# Patient Record
Sex: MALE | Race: OTHER | HISPANIC OR LATINO
[De-identification: names, ages, dates, MRNs, and addresses within clinical notes are randomized per-mention and may not be internally consistent; named-entity substitution may affect disease eponyms.]

---

## 2023-06-22 PROBLEM — Z00.00 ENCOUNTER FOR PREVENTIVE HEALTH EXAMINATION: Status: ACTIVE | Noted: 2023-06-22

## 2023-07-18 ENCOUNTER — APPOINTMENT (OUTPATIENT)
Dept: NEUROSURGERY | Facility: CLINIC | Age: 63
End: 2023-07-18
Payer: MEDICAID

## 2023-07-18 VITALS
HEIGHT: 71 IN | OXYGEN SATURATION: 96 % | TEMPERATURE: 97.9 F | BODY MASS INDEX: 24.08 KG/M2 | WEIGHT: 172 LBS | SYSTOLIC BLOOD PRESSURE: 132 MMHG | HEART RATE: 81 BPM | DIASTOLIC BLOOD PRESSURE: 84 MMHG

## 2023-07-18 DIAGNOSIS — Z86.39 PERSONAL HISTORY OF OTHER ENDOCRINE, NUTRITIONAL AND METABOLIC DISEASE: ICD-10-CM

## 2023-07-18 DIAGNOSIS — Z87.39 PERSONAL HISTORY OF OTHER DISEASES OF THE MUSCULOSKELETAL SYSTEM AND CONNECTIVE TISSUE: ICD-10-CM

## 2023-07-18 DIAGNOSIS — M54.9 DORSALGIA, UNSPECIFIED: ICD-10-CM

## 2023-07-18 DIAGNOSIS — G95.9 DISEASE OF SPINAL CORD, UNSPECIFIED: ICD-10-CM

## 2023-07-18 DIAGNOSIS — Z86.79 PERSONAL HISTORY OF OTHER DISEASES OF THE CIRCULATORY SYSTEM: ICD-10-CM

## 2023-07-18 DIAGNOSIS — Z78.9 OTHER SPECIFIED HEALTH STATUS: ICD-10-CM

## 2023-07-18 DIAGNOSIS — M54.16 RADICULOPATHY, LUMBAR REGION: ICD-10-CM

## 2023-07-18 DIAGNOSIS — M51.26 OTHER INTERVERTEBRAL DISC DISPLACEMENT, LUMBAR REGION: ICD-10-CM

## 2023-07-18 DIAGNOSIS — M50.20 OTHER CERVICAL DISC DISPLACEMENT, UNSPECIFIED CERVICAL REGION: ICD-10-CM

## 2023-07-18 DIAGNOSIS — M48.00 SPINAL STENOSIS, SITE UNSPECIFIED: ICD-10-CM

## 2023-07-18 PROCEDURE — 99203 OFFICE O/P NEW LOW 30 MIN: CPT

## 2023-07-18 RX ORDER — LISINOPRIL 10 MG/1
10 TABLET ORAL
Refills: 0 | Status: ACTIVE | COMMUNITY

## 2023-07-20 NOTE — PHYSICAL EXAM
[General Appearance - Alert] : alert [General Appearance - In No Acute Distress] : in no acute distress [Oriented To Time, Place, And Person] : oriented to person, place, and time [Impaired Insight] : insight and judgment were intact [Affect] : the affect was normal [2+] : Patella left 2+ [Normal] : normal [4] : 4/5 Ankle Plantar Flexion (S1) [5] : 5/5 Ankle Plantar Flexion (S1) [3+] : Brachioradialis left 3+ [Straight-Leg Raise Test - Left] : straight leg raise of the left leg was negative [Straight-Leg Raise Test - Right] : straight leg raise  of the right leg was negative

## 2023-07-20 NOTE — REVIEW OF SYSTEMS
[Negative] : Heme/Lymph [As Noted in HPI] : as noted in HPI [Numbness] : numbness [Tingling] : tingling [Abnormal Sensation] : an abnormal sensation [Difficulty Walking] : difficulty walking [de-identified] : LBP

## 2023-07-20 NOTE — ASSESSMENT
[FreeTextEntry1] : Mr. Bejarano is a 64 y/o, male, with ,multilevel spondylosis and stenosis in both cervical and lumbar spine.He is very myelopathic on exam. Will obtain CT cervical spine for evaluation of OPLL and surgical planning.  Will refer pt to physical therapy for cervical and lumbar spine. Will obtain xrays to r/o instability. F/u once imaging is completed.

## 2023-07-20 NOTE — RESULTS/DATA
[FreeTextEntry1] : IMPRESSION: \par \par 1. At L4-L5, stable minimal grade 1 retrolisthesis  with associated stable broad-based disc bulge demonstrating annular fissuring again abutting the traversing L5 nerve roots with stable mild bilateral subarticular recess stenosis and stable moderate bilateral neuroforaminal stenosis. \par \par \par 2. At L5-S1, stable minimal grade 1 retrolisthesis with associated stable disc bulge demonstrating annular fissuring again contacting the exiting left L5 nerve root and right greater than left traversing S1 nerve roots. Stable mild to moderate left and mild right-sided neuroforaminal stenosis. \par \par 3. Stable diffusely heterogeneous marrow signal which can be seen with osteopenia/osteoporosis or underlying metabolic conditions such as renal osteodystrophy.

## 2023-07-20 NOTE — HISTORY OF PRESENT ILLNESS
[FreeTextEntry1] : lbp [de-identified] : Mr. Bejarano ids a 62 y/o, male, with hx of HTN, HLD, LBP here to establish care as a new patient. He reports his low back pain radiates down R leg. He describes the pain more as a discomfort. He has associated numbness, tingling, and weakness of R leg. MRI cervical and lumbar spine were performed which illustrate multilevel spondylosis, herniations, and stenosis. Denies bowel/ bladder dysfunction. Denies any neck pain, UE pain. He has numbness of fingers.

## 2025-05-05 ENCOUNTER — INPATIENT (INPATIENT)
Facility: HOSPITAL | Age: 65
LOS: 17 days | Discharge: HOME | DRG: 68 | End: 2025-05-23
Attending: NEUROLOGICAL SURGERY | Admitting: INTERNAL MEDICINE
Payer: COMMERCIAL

## 2025-05-05 VITALS
WEIGHT: 169.98 LBS | TEMPERATURE: 98 F | DIASTOLIC BLOOD PRESSURE: 77 MMHG | HEART RATE: 68 BPM | HEIGHT: 69 IN | RESPIRATION RATE: 16 BRPM | SYSTOLIC BLOOD PRESSURE: 113 MMHG | OXYGEN SATURATION: 98 %

## 2025-05-05 DIAGNOSIS — I65.29 OCCLUSION AND STENOSIS OF UNSPECIFIED CAROTID ARTERY: ICD-10-CM

## 2025-05-05 LAB
ALBUMIN SERPL ELPH-MCNC: 4.2 G/DL — SIGNIFICANT CHANGE UP (ref 3.3–5)
ALP SERPL-CCNC: 103 U/L — SIGNIFICANT CHANGE UP (ref 40–120)
ALT FLD-CCNC: 33 U/L — SIGNIFICANT CHANGE UP (ref 10–45)
ANION GAP SERPL CALC-SCNC: 10 MMOL/L — SIGNIFICANT CHANGE UP (ref 5–17)
ANION GAP SERPL CALC-SCNC: 11 MMOL/L — SIGNIFICANT CHANGE UP (ref 5–17)
APTT BLD: 30.4 SEC — SIGNIFICANT CHANGE UP (ref 26.1–36.8)
AST SERPL-CCNC: 33 U/L — SIGNIFICANT CHANGE UP (ref 10–40)
BASOPHILS # BLD AUTO: 0.03 K/UL — SIGNIFICANT CHANGE UP (ref 0–0.2)
BASOPHILS NFR BLD AUTO: 0.4 % — SIGNIFICANT CHANGE UP (ref 0–2)
BILIRUB SERPL-MCNC: 0.3 MG/DL — SIGNIFICANT CHANGE UP (ref 0.2–1.2)
BUN SERPL-MCNC: 11 MG/DL — SIGNIFICANT CHANGE UP (ref 7–23)
BUN SERPL-MCNC: 14 MG/DL — SIGNIFICANT CHANGE UP (ref 7–23)
CALCIUM SERPL-MCNC: 8.5 MG/DL — SIGNIFICANT CHANGE UP (ref 8.4–10.5)
CALCIUM SERPL-MCNC: 8.9 MG/DL — SIGNIFICANT CHANGE UP (ref 8.4–10.5)
CHLORIDE SERPL-SCNC: 103 MMOL/L — SIGNIFICANT CHANGE UP (ref 96–108)
CHLORIDE SERPL-SCNC: 105 MMOL/L — SIGNIFICANT CHANGE UP (ref 96–108)
CO2 SERPL-SCNC: 21 MMOL/L — LOW (ref 22–31)
CO2 SERPL-SCNC: 22 MMOL/L — SIGNIFICANT CHANGE UP (ref 22–31)
CREAT SERPL-MCNC: 0.7 MG/DL — SIGNIFICANT CHANGE UP (ref 0.5–1.3)
CREAT SERPL-MCNC: 0.82 MG/DL — SIGNIFICANT CHANGE UP (ref 0.5–1.3)
EGFR: 102 ML/MIN/1.73M2 — SIGNIFICANT CHANGE UP
EGFR: 102 ML/MIN/1.73M2 — SIGNIFICANT CHANGE UP
EGFR: 97 ML/MIN/1.73M2 — SIGNIFICANT CHANGE UP
EGFR: 97 ML/MIN/1.73M2 — SIGNIFICANT CHANGE UP
EOSINOPHIL # BLD AUTO: 0.04 K/UL — SIGNIFICANT CHANGE UP (ref 0–0.5)
EOSINOPHIL NFR BLD AUTO: 0.5 % — SIGNIFICANT CHANGE UP (ref 0–6)
GAS PNL BLDV: SIGNIFICANT CHANGE UP
GLUCOSE SERPL-MCNC: 89 MG/DL — SIGNIFICANT CHANGE UP (ref 70–99)
GLUCOSE SERPL-MCNC: 97 MG/DL — SIGNIFICANT CHANGE UP (ref 70–99)
HCT VFR BLD CALC: 38.4 % — LOW (ref 39–50)
HCT VFR BLD CALC: 42.3 % — SIGNIFICANT CHANGE UP (ref 39–50)
HGB BLD-MCNC: 12.6 G/DL — LOW (ref 13–17)
HGB BLD-MCNC: 13.7 G/DL — SIGNIFICANT CHANGE UP (ref 13–17)
IMM GRANULOCYTES NFR BLD AUTO: 0.4 % — SIGNIFICANT CHANGE UP (ref 0–0.9)
INR BLD: 1.1 RATIO — SIGNIFICANT CHANGE UP (ref 0.85–1.16)
LYMPHOCYTES # BLD AUTO: 1.83 K/UL — SIGNIFICANT CHANGE UP (ref 1–3.3)
LYMPHOCYTES # BLD AUTO: 22 % — SIGNIFICANT CHANGE UP (ref 13–44)
MAGNESIUM SERPL-MCNC: 2 MG/DL — SIGNIFICANT CHANGE UP (ref 1.6–2.6)
MCHC RBC-ENTMCNC: 29 PG — SIGNIFICANT CHANGE UP (ref 27–34)
MCHC RBC-ENTMCNC: 29.3 PG — SIGNIFICANT CHANGE UP (ref 27–34)
MCHC RBC-ENTMCNC: 32.4 G/DL — SIGNIFICANT CHANGE UP (ref 32–36)
MCHC RBC-ENTMCNC: 32.8 G/DL — SIGNIFICANT CHANGE UP (ref 32–36)
MCV RBC AUTO: 89.3 FL — SIGNIFICANT CHANGE UP (ref 80–100)
MCV RBC AUTO: 89.6 FL — SIGNIFICANT CHANGE UP (ref 80–100)
MONOCYTES # BLD AUTO: 0.67 K/UL — SIGNIFICANT CHANGE UP (ref 0–0.9)
MONOCYTES NFR BLD AUTO: 8.1 % — SIGNIFICANT CHANGE UP (ref 2–14)
NEUTROPHILS # BLD AUTO: 5.7 K/UL — SIGNIFICANT CHANGE UP (ref 1.8–7.4)
NEUTROPHILS NFR BLD AUTO: 68.6 % — SIGNIFICANT CHANGE UP (ref 43–77)
NRBC BLD AUTO-RTO: 0 /100 WBCS — SIGNIFICANT CHANGE UP (ref 0–0)
NRBC BLD AUTO-RTO: 0 /100 WBCS — SIGNIFICANT CHANGE UP (ref 0–0)
PHOSPHATE SERPL-MCNC: 2.2 MG/DL — LOW (ref 2.5–4.5)
PLATELET # BLD AUTO: 305 K/UL — SIGNIFICANT CHANGE UP (ref 150–400)
PLATELET # BLD AUTO: 327 K/UL — SIGNIFICANT CHANGE UP (ref 150–400)
POTASSIUM SERPL-MCNC: 3.9 MMOL/L — SIGNIFICANT CHANGE UP (ref 3.5–5.3)
POTASSIUM SERPL-MCNC: 4.1 MMOL/L — SIGNIFICANT CHANGE UP (ref 3.5–5.3)
POTASSIUM SERPL-SCNC: 3.9 MMOL/L — SIGNIFICANT CHANGE UP (ref 3.5–5.3)
POTASSIUM SERPL-SCNC: 4.1 MMOL/L — SIGNIFICANT CHANGE UP (ref 3.5–5.3)
PROT SERPL-MCNC: 7.3 G/DL — SIGNIFICANT CHANGE UP (ref 6–8.3)
PROTHROM AB SERPL-ACNC: 12.6 SEC — SIGNIFICANT CHANGE UP (ref 9.9–13.4)
RBC # BLD: 4.3 M/UL — SIGNIFICANT CHANGE UP (ref 4.2–5.8)
RBC # BLD: 4.72 M/UL — SIGNIFICANT CHANGE UP (ref 4.2–5.8)
RBC # FLD: 12.6 % — SIGNIFICANT CHANGE UP (ref 10.3–14.5)
RBC # FLD: 12.9 % — SIGNIFICANT CHANGE UP (ref 10.3–14.5)
SODIUM SERPL-SCNC: 135 MMOL/L — SIGNIFICANT CHANGE UP (ref 135–145)
SODIUM SERPL-SCNC: 137 MMOL/L — SIGNIFICANT CHANGE UP (ref 135–145)
TROPONIN T, HIGH SENSITIVITY RESULT: 8 NG/L — SIGNIFICANT CHANGE UP (ref 0–51)
WBC # BLD: 7.08 K/UL — SIGNIFICANT CHANGE UP (ref 3.8–10.5)
WBC # BLD: 8.3 K/UL — SIGNIFICANT CHANGE UP (ref 3.8–10.5)
WBC # FLD AUTO: 7.08 K/UL — SIGNIFICANT CHANGE UP (ref 3.8–10.5)
WBC # FLD AUTO: 8.3 K/UL — SIGNIFICANT CHANGE UP (ref 3.8–10.5)

## 2025-05-05 PROCEDURE — 93970 EXTREMITY STUDY: CPT | Mod: 26

## 2025-05-05 PROCEDURE — 93010 ELECTROCARDIOGRAM REPORT: CPT

## 2025-05-05 PROCEDURE — 70496 CT ANGIOGRAPHY HEAD: CPT | Mod: 26

## 2025-05-05 PROCEDURE — 0042T: CPT

## 2025-05-05 PROCEDURE — 99291 CRITICAL CARE FIRST HOUR: CPT

## 2025-05-05 PROCEDURE — 70498 CT ANGIOGRAPHY NECK: CPT | Mod: 26

## 2025-05-05 PROCEDURE — 70450 CT HEAD/BRAIN W/O DYE: CPT | Mod: 26,XU

## 2025-05-05 RX ORDER — ACETAMINOPHEN 500 MG/5ML
650 LIQUID (ML) ORAL EVERY 6 HOURS
Refills: 0 | Status: DISCONTINUED | OUTPATIENT
Start: 2025-05-05 | End: 2025-05-21

## 2025-05-05 RX ORDER — SODIUM CHLORIDE 9 G/1000ML
1000 INJECTION, SOLUTION INTRAVENOUS
Refills: 0 | Status: DISCONTINUED | OUTPATIENT
Start: 2025-05-05 | End: 2025-05-09

## 2025-05-05 RX ORDER — ATORVASTATIN CALCIUM 80 MG/1
10 TABLET, FILM COATED ORAL AT BEDTIME
Refills: 0 | Status: DISCONTINUED | OUTPATIENT
Start: 2025-05-05 | End: 2025-05-05

## 2025-05-05 RX ORDER — POTASSIUM PHOSPHATE, MONOBASIC POTASSIUM PHOSPHATE, DIBASIC INJECTION, 236; 224 MG/ML; MG/ML
30 SOLUTION, CONCENTRATE INTRAVENOUS ONCE
Refills: 0 | Status: COMPLETED | OUTPATIENT
Start: 2025-05-05 | End: 2025-05-06

## 2025-05-05 RX ORDER — ATORVASTATIN CALCIUM 80 MG/1
80 TABLET, FILM COATED ORAL AT BEDTIME
Refills: 0 | Status: DISCONTINUED | OUTPATIENT
Start: 2025-05-05 | End: 2025-05-21

## 2025-05-05 RX ORDER — ACETAMINOPHEN 500 MG/5ML
750 LIQUID (ML) ORAL EVERY 6 HOURS
Refills: 0 | Status: DISCONTINUED | OUTPATIENT
Start: 2025-05-05 | End: 2025-05-06

## 2025-05-05 RX ORDER — NOREPINEPHRINE BITARTRATE 8 MG
0.05 SOLUTION INTRAVENOUS
Qty: 8 | Refills: 0 | Status: DISCONTINUED | OUTPATIENT
Start: 2025-05-05 | End: 2025-05-09

## 2025-05-05 RX ORDER — SENNA 187 MG
2 TABLET ORAL AT BEDTIME
Refills: 0 | Status: DISCONTINUED | OUTPATIENT
Start: 2025-05-05 | End: 2025-05-21

## 2025-05-05 RX ORDER — HEPARIN SODIUM 1000 [USP'U]/ML
1000 INJECTION INTRAVENOUS; SUBCUTANEOUS
Qty: 25000 | Refills: 0 | Status: DISCONTINUED | OUTPATIENT
Start: 2025-05-05 | End: 2025-05-10

## 2025-05-05 RX ADMIN — Medication 2 TABLET(S): at 22:00

## 2025-05-05 RX ADMIN — SODIUM CHLORIDE 100 MILLILITER(S): 9 INJECTION, SOLUTION INTRAVENOUS at 19:28

## 2025-05-05 RX ADMIN — NOREPINEPHRINE BITARTRATE 7.23 MICROGRAM(S)/KG/MIN: 8 SOLUTION at 19:28

## 2025-05-05 RX ADMIN — ATORVASTATIN CALCIUM 80 MILLIGRAM(S): 80 TABLET, FILM COATED ORAL at 22:00

## 2025-05-05 RX ADMIN — Medication 1000 MILLILITER(S): at 13:41

## 2025-05-05 RX ADMIN — HEPARIN SODIUM 10 UNIT(S)/HR: 1000 INJECTION INTRAVENOUS; SUBCUTANEOUS at 19:10

## 2025-05-05 NOTE — H&P ADULT - NSHPLABSRESULTS_GEN_ALL_CORE
< from: CT Brain Perfusion Maps Stroke (05.05.25 @ 12:48) >    CT brain:  No hydrocephalus, acute intracranial hemorrhage, mass effect, or brain   edema.    Chronic right basal ganglia infarct.    CT brain perfusion:  No significant technical limitations.    Cerebral blood flow less than 30% = 0 mL.    Tmax greater than 6 seconds = 167 mL and involves much of the right MCA   vascular territory as well as left parietal white matter in a watershed   distribution.    Mismatch volume: 167 mL  Mismatch ratio: Infinite    CTA brain:  Moderate segment stenosis of the proximal supraclinoid left ICA. Milder   stenosis of the cavernous left ICA. Normal flow-related enhancement of   the more proximal skull base and distal intracranial left ICA.    Lack of flow-related enhancement of the skull base/intracranial right ICA.    Lack of flow related enhancement of the proximal and mid right M1   segment. More distal right M1 segment is small in caliber and   demonstrates minimal enhancement.    Poor flow related enhancement of the proximal right M2 segments. There is   flow-related enhancement of the more distal right MCA.    Moderate to severe stenosis of the proximal intradural right vertebral   arterywith normal flow-related enhancement of the more distal vessel.    Mild to moderate stenosis of the proximal intradural left vertebral   artery with normal flow-related enhancement of the more distal vessel.    CTA neck:  Poor visualization of the origin of the right internal carotid artery.   The proximal segment partially reconstitutes and rapidly progresses to   occlusion in the setting of partially calcified plaque. The presence of   acute dissection is not excluded. No flow related enhancement of the more   distal right ICA in the neck.    Dr. Dodson discussed these findings with Dr. Priya Hsu from   neurology on 5/5/2025 12:42 PM with read back.      < end of copied text >

## 2025-05-05 NOTE — DISCHARGE NOTE NURSING/CASE MANAGEMENT/SOCIAL WORK - NSDCPEFALRISK_GEN_ALL_CORE
For information on Fall & Injury Prevention, visit: [email or web address] OR  [email or web address] OR  https://www.cdc.gov/steadi/patient.html

## 2025-05-05 NOTE — DISCHARGE NOTE NURSING/CASE MANAGEMENT/SOCIAL WORK - PATIENT PORTAL LINK FT
You can access the FollowMyHealth Patient Portal offered by [facility] by registering at the following website: [email or web address]. By joining [facility]’s FollowMyHealth portal, you will also be able to view your health information using other applications (apps) compatible with our system.

## 2025-05-05 NOTE — H&P ADULT - HISTORY OF PRESENT ILLNESS
64 LHM PMHx HTN and HLD not on AC/AP presenting to [facility] as a code stroke for left sided weakness. LKW 7AM 5/5. Patient then felt LUE/LLE weakness. Had an episode of speech disturbance also around 9:30 AM, that resolved after unknown duration of time. Also, over the past few months, patient has had numbness in LUE/LLE coming and going. Denies HA, vision changes. Denies prior history of stroke. BP on presentation 113/77    NIHSS:4  preMRS:0    Not a tenecteplase candidate outside of the time window.   Not a thrombectomy candidate given likely chronic occlusion per neurointerventional team

## 2025-05-05 NOTE — H&P ADULT - ASSESSMENT
64 LHM PMHx HTN, HLD not on AC/AP p/w fluctuating left sided weakness and transient speech disturbance found to have right ICA occlusion without reconstituion. Moderate stenosis of the prox supraclinoid L ICA and mild stenosis of L cavernous ica. Mod to severe stenosis of prox right V4. Mild to mod stenosis of the prox left V4. No core. Penumbra in right MCA territory. Neuro exam c/f fluctuating left hemiparesis.     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     Recommendations:   [] Admit to NSCU for q1 neurochecks and pressure support   [] NeuroIR consulted, following  [] Heparin gtt goal ptt 40-60  []  Atorvastatin 80, titrate to LDL<70  []  MRI brain w/o contrast to look at the extent and distribution of the stroke   []  MRA H/N WITH T1 fat sat and NOVA  [] TTE with bubble study and telemetry to look for a cardiac source of embolism.   []  DVT prophylaxis   []  Telemonitoring  []  Permissive HTN up to 220/120 mmHg for first 24 hours after the symptom onset followed by gradual normotension.   []  Please send HbA1C and Lipid Panel  []  PT/OT evaluation  []  NPO until clears Dysphagia screen, otherwise Swallow evaluation  []  Stroke education provided     D/w stroke fellow under supervision of stroke attending

## 2025-05-05 NOTE — H&P ADULT - NSHPPHYSICALEXAM_GEN_ALL_CORE
Neurological Exam:  Mental Status: Orientated to self, date and place.  Attention intact.  No dysarthria. Speech fluent.  Cranial Nerves:   PERRL, EOMI, VFF, no nystagmus.    CN V1-3 intact to light touch . Left facial droop. Hearing intact to finger rub bilaterally.  Tongue, uvula and palate midline.  Sternocleidomastoid and Trapezius intact bilaterally.    Motor:   Tone: normal.                  Strength:     RUE 5/5  RLE 5/5   LUE 4+/5  LLE 4+/5  Pronator drift: none                 Dysmetria: LUE dysmetria. None on heel to shin b/l    Sensation: intact to light touch, pinprick    Gait: Falling to left

## 2025-05-05 NOTE — DISCHARGE NOTE NURSING/CASE MANAGEMENT/SOCIAL WORK - FINANCIAL ASSISTANCE
[facility] provides services at a reduced cost to those who are determined to be eligible through [facility]’s financial assistance program. Information regarding [facility]’s financial assistance program can be found by going to [email or web address] or by calling [phone].

## 2025-05-05 NOTE — ED PROVIDER NOTE - ATTENDING CONTRIBUTION TO CARE
Attending Rajani: I performed a history and physical exam of the patient and discussed their management with the resident/fellow/student. I have reviewed the resident/fellow/student note and agree with the documented findings and plan of care, except as noted. I have personally performed a substantive portion of the visit including all aspects of the medical decision making. My medical decision making and observations are found below. Please refer to any progress notes for updates on clinical course. My notes supersedes the above resident/fellow/student note in case of discrepancy    MDM:  63 y/o M w/ PMH of HLD, HTN presenting w/ L sided weakness. Seen w/ wife. Pt primarily Malagasy speaking. Staff assisting w/ Malagasy translation per pt request, translation services declined. Pt last known normal at 7am this morning. Around 9:30am wife noticed his speech seemed off and he had drooping of the L side of his face. Also noticed he had trouble walking. Has baseline gait issues due to R leg problems, but today seemed to have issues w/ his L leg. No recent falls or head trauma. Denies fevers, chills, headache, dizziness, blurred vision, chest pain, cough, shortness of breath, abdominal pain, n/v/d/c, urinary symptoms, MSK pain, rash.     Gen: NAD, AOx3, able to make needs known, non-toxic  Head: NCAT  HEENT: EOMI, oral mucosa moist, normal conjunctiva  Lung: speaking in full sentences, no respiratory distress, CTAB  CV: RRR  Abd: non distended, soft, nontender, no guarding, no CVA tenderness  MSK: Digits missing on R hand (old injury). Remainder of extremities without deformities  Neuro: L sided facial droop, remainder of CN 2-12 grossly intact b/l. speech clear. str 5/5 RUE and RLE. Str 4/5 LUE and LLE. Dysmetria of LUE.  Skin: Warm, well perfused  Psych: normal affect    Pt here w/ L sided weakness and facial droop. Code stroke activated on arrival. Will f/u code stroke labs/imaging/neuro recs. Will eval for metabolic vs. infectious abnormalities. Plan for labs, imaging, EKG, meds PRN. Will reassess the need for additional interventions as clinically warranted. Refer to any progress notes for updates on clinical course and as a continuation of this MDM.     I, Dr. Moises Gerard, independently interpreted the EKG which showed sinus rhythm w/ sinus arrythmia and 1st degree AV block at a rate of 71, QTc of 434.    Critical care billing:  Upon my evaluation, this patient had a high probability of imminent or life-threatening deterioration due to CVA requiring admission to Neurosurgical Intensive Care Unit, which required my direct attention, intervention, and personal management.  The patient has a  medical condition that impairs one or more vital organ systems.  Frequent personal assessment and adjustment of medical interventions was performed.    I have personally provided 45 minutes of critical care time exclusive of time spent on separately billable procedures. Time includes review of any laboratory data, radiology results, discussion with consultants, patient and/or family; monitoring for potential decompensation, as well as time spent retrieving data and reviewing the chart and documenting the visit. Interventions were performed as documented above.

## 2025-05-05 NOTE — PROGRESS NOTE ADULT - SUBJECTIVE AND OBJECTIVE BOX
HOSPITAL COURSE: 63yo Guamanian-speaking man, left-handed, HTN, HLD, brought to ED for L sided weakness. Per family at bedside, he's been increasingly leaning towards left especially in the middle of the night getting up to use restroom or early morning waking up.  Per stroke H/P, LKW 7AM 5/5. Patient then felt LUE/LLE weakness. Had an episode of speech disturbance also around 9:30 AM, that resolved after unknown duration of time. Also, over the past few months, patient has had numbness in LUE/LLE coming and going. Denies HA, vision changes. Denies prior history of stroke. BP on presentation 113/77    NIHSS:4  preMRS:0    Not a tenecteplase candidate outside of the time window.   Not a thrombectomy candidate given likely chronic occlusion per neurointerventional team       Allergies    No Known Allergies    Intolerances        REVIEW OF SYSTEMS: [ ] Unable to Assess due to neurologic exam   [ x] All ROS addressed below are non-contributory, except:  Neuro: [ ] Headache [ ] Back pain [x ] Numbness [x ] Weakness [ ] Ataxia [ ] Dizziness [ ] Aphasia [ x] Dysarthria [ ] Visual disturbance  Resp: [ ] Shortness of breath/dyspnea, [ ] Orthopnea [ ] Cough  CV: [ ] Chest pain [ ] Palpitation [ ] Lightheadedness [ ] Syncope  Renal: [ ] Thirst [ ] Edema  GI: [ ] Nausea [ ] Emesis [ ] Abdominal pain [ ] Constipation [ ] Diarrhea  Hem: [ ] Hematemesis [ ] bright red blood per rectum  ID: [ ] Fever [ ] Chills [ ] Dysuria  ENT: [ ] Rhinorrhea      DEVICES:   [ ] Restraints [ ] ET tube [ ] central line [x ] arterial line --to be placed at bedside [ ] lomax [ ] NGT/OGT [ ] EVD [ ] LD [ ] MATTHIEU/HMV [ ] Trach [ ] PEG [ ] Chest Tube     VITALS:   Vital Signs Last 24 Hrs  T(C): 36.9 (05 May 2025 16:15), Max: 36.9 (05 May 2025 16:15)  T(F): 98.4 (05 May 2025 16:15), Max: 98.4 (05 May 2025 16:15)  HR: 65 (05 May 2025 16:15) (65 - 70)  BP: 140/76 (05 May 2025 16:15) (113/77 - 140/76)  BP(mean): 102 (05 May 2025 16:15) (88 - 102)  RR: 20 (05 May 2025 16:15) (16 - 20)  SpO2: 97% (05 May 2025 16:15) (97% - 100%)    Parameters below as of 05 May 2025 16:15  Patient On (Oxygen Delivery Method): room air      CAPILLARY BLOOD GLUCOSE      POCT Blood Glucose.: 97 mg/dL (05 May 2025 12:18)    I&O's Summary      Respiratory:        LABS:                        13.7   8.30  )-----------( 327      ( 05 May 2025 12:33 )             42.3     05-05    135  |  103  |  14  ----------------------------<  97  4.1   |  21[L]  |  0.82        MEDICATIONS  (STANDING):    MEDICATIONS  (PRN):  acetaminophen   IVPB .. 750 milliGRAM(s) IV Intermittent every 6 hours PRN Severe Pain (7 - 10)      PHYSICAL EXAM:    Constitutional: No Acute Distress     Neurological: Awake, alert oriented to person, place and time, Following Commands, PERRL, EOMI, No Gaze Preference, Face Symmetrical, Speech Fluent, +L extinction     Motor exam:          Upper extremity                         Delt     Bicep     Tricep    HG                                                 R         5/5        5/5        5/5       5/5                                               L          5/5        5/5        5/5       5/5          Lower extremity                        HF         KF        KE       DF         PF                                                  R        5/5        5/5        5/5       5/5         5/5                                               L         5/5        5/5       5/5       5/5          5/5                                                 Sensation: [x ] intact to light touch  [ ] decreased:     Pulmonary: Clear to Auscultation, No rales, No rhonchi, No wheezes     Cardiovascular: S1, S2, Regular rate and rhythm     Gastrointestinal: Soft, Non-tender, Non-distended     Extremities: No calf tenderness     Incision:

## 2025-05-05 NOTE — STROKE CODE NOTE - STROKE TEAM ASSESSMENT
Patient seen and discharged by the provider.     Gertrudis Klein RN  10/30/24 0633     05-May-2025 12:28

## 2025-05-05 NOTE — ED PROVIDER NOTE - CLINICAL SUMMARY MEDICAL DECISION MAKING FREE TEXT BOX
64-year-old Vietnamese-speaking male history of hypertension hyperlipidemia presenting with right lower leg weakness as well as left upper extremity weakness.  Code stroke was activated as patient woke up this morning at 7 AM with these concerns. Patient brought in by wife who states that he was more unsteady on his feet.  States he has chronic back pain with gait issues however his gait was worse this morning.  States decreased  strength in the left upper extremity.  Concerns for left sided facial droop. Denies any slurred speech.  Denies any fevers chills chest pain shortness of breath abdominal pain nausea vomiting diarrhea. No prior hx of stroke    Dada Gomez DO (PGY3)   Physical Exam:    Gen: NAD, AOx3  Head: NCAT  HEENT: EOMI, PEERLA  Lung: CTAB  CV: RRR  Abd: soft, NT, ND, no guarding, no rigidity, no rebound tenderness, no CVA tenderness   MSK: no visible deformities, ROM normal in UE/LE, no midline ttp to entire spine, pelvis stable  Neuro: NIH 3, R leg weakness with ambulation, L hand decreased  strength. +L sided facial droop. Sensation intact to light touch   Skin: Warm, well perfused, no rash, no leg swelling  Psych: normal affect    Plan for code stroke imaging, labs, neurology evaluation.  Differential diagnosis includes but not limited to ICH versus occlusion versus infectious etiology versus metabolic derangement.  Final dispo pending labs imaging close reassessment. 64-year-old Croatian-speaking male history of hypertension hyperlipidemia presenting with right lower leg weakness as well as left upper extremity weakness.  Code stroke was activated as patient woke up this morning at 7 AM with these concerns. Patient brought in by wife who states that he was more unsteady on his feet.  States he has chronic back pain with gait issues however his gait was worse this morning.  States decreased  strength in the left upper extremity.  Concerns for left sided facial droop. Denies any slurred speech.  Denies any fevers chills chest pain shortness of breath abdominal pain nausea vomiting diarrhea. No prior hx of stroke    Dada Gomez DO (PGY3)   Physical Exam:    Gen: NAD, AOx3  Head: NCAT  HEENT: EOMI, PEERLA  Lung: CTAB  CV: RRR  Abd: soft, NT, ND, no guarding, no rigidity, no rebound tenderness, no CVA tenderness   MSK: no visible deformities, ROM normal in UE/LE, no midline ttp to entire spine, pelvis stable  Neuro: NIH 3, R leg weakness with ambulation, L hand decreased  strength. +L sided facial droop. Sensation intact to light touch   Skin: Warm, well perfused, no rash, no leg swelling  Psych: normal affect    Plan for code stroke imaging, labs, neurology evaluation.  Differential diagnosis includes but not limited to ICH versus occlusion versus infectious etiology versus metabolic derangement.  Final dispo pending labs imaging close reassessment.    Attending Nello: See attending attestation.

## 2025-05-05 NOTE — DISCHARGE NOTE NURSING/CASE MANAGEMENT/SOCIAL WORK - NSSCTYPOFSERV_GEN_ALL_CORE
Admission Reconciliation is Completed  Discharge Reconciliation is Completed visiting nurse, physical / occupational therapy.

## 2025-05-05 NOTE — STROKE CODE NOTE - ACTIVATED STROKE TEAM
BRIEF OPERATIVE NOTE Date of Procedure: 11/8/2018 Preoperative Diagnosis: Neurogenic claudication secondary to lumbar canal stenosis Postoperative Diagnosis: Same Procedure(s): 
(1)  L3/4 decompressive laminectomy, including medial facetectomy (partial) and foraminotomies; (2) L4/5 decompressive laminectomy, partial medial facetectomies and foraminotomies Surgeon(s) and Role: * Roman Couch MD - Primary Surgical Assistant: Jemima Hernandez Surgical Staff: 
Circ-1: Brad John RN Radiology Technician: Bandar [place] Scrub Tech-1: Bulmaro Meier Scrub Tech-2: Su Dee Surg Asst-1: Dre Oar Event Time In Time Out Incision Start 6110 Incision Close 11:25 Anesthesia: General  
Estimated Blood Loss: 50cc Specimens: None Findings: Severe L4/5 and L3/4 central and lateral recess stenosis Complications: None Implants: None Yes

## 2025-05-05 NOTE — H&P ADULT - TIME BILLING
I spent 80 minutes in preparation to see the patient, including reviewing the brain imaging in past one year, obtaining and/or reviewing the resident/ or PA note, separately obtained history, performing a medically appropriate examination and/or evaluation as documented in this encounter note, counseling and educating of the patient, ordering tests, documenting clinical information in patients electronic record , interpreting results (not separately reported) and communicating results to the patient.   Evaluation was performed on 5/6/25    R-ICA stroke 2/2 complete OSVALDO occlusion, likely acute on top of chronic   Clinically: much improved   NIHSS: 1  CTA: Complete occlusion of OSVALDO  PLAN:  No intervention at this point   Cont DAPT  . Continue Statin with LDL goal< 70  . Strict Blood sugar control with goal Hb-A1c < 6.5   . Strict BP control with Goal < 140/80   . PT/OT/Speech

## 2025-05-05 NOTE — PROGRESS NOTE ADULT - ASSESSMENT
ASSESSMENT/PLAN: R ICA-MCA occlusion     NEURO:  Activity: [] mobilize as tolerated [] Bedrest [] PT [] OT [] PMNR    PULM:    CV:  SBP goal    RENAL:  Fluids:    GI:  Diet:  GI prophylaxis [] not indicated [] PPI [] other:  Bowel regimen [] colace [] senna [] other:    ENDO:   Goal euglycemia (-180)    HEME/ONC:  VTE prophylaxis: [] SCDs [] chemoprophylaxis [] hold chemoprophylaxis due to: [] high risk of DVT/PE on admission due to:    ID:    MISC:    SOCIAL/FAMILY:  [] awaiting [] updated at bedside [] family meeting    CODE STATUS:  [] Full Code [] DNR [] DNI [] Palliative/Comfort Care    DISPOSITION:  [] ICU [] Stroke Unit [] Floor [] EMU [] RCU [] PCU    [] Patient is at high risk of neurologic deterioration/death due to:     Time seen:  Time spent: ___ [] critical care minutes    Contact: [phone] ASSESSMENT/PLAN: R ICA-MCA occlusion     NEURO:  Q1 hr neurochecks  stroke core measures  stroke neurology following  neuro IR following  MRI/MRA/NOVA pending  start heparin gtt without bolus goal PTT 50-70  Activity: [x] mobilize as tolerated [] Bedrest [x] PT [x] OT [] PMNR    PULM:  RA/IS  keep O2sat>92%    CV:  SBP goal 120-180  fluid boluses prn  arterial line placement  phenylephrine prn  atorvastatin 80mg   EKG  TTE w/ bubbles     RENAL:  Fluids: IVF     GI:  Diet: NPO for now   GI prophylaxis [x] not indicated [] PPI [] other:  Bowel regimen [] colace [x] senna [x] other: miralax     ENDO:   Goal euglycemia (-180)  A1C, TFTs check  AKBAR     HEME/ONC:  VTE prophylaxis: [x] SCDs [] chemoprophylaxis --start heparin gtt     ID: monitor for fevers     MISC:    SOCIAL/FAMILY:  [] awaiting [x] updated at bedside --son and wife [] family meeting    CODE STATUS:  [x] Full Code [] DNR [] DNI [] Palliative/Comfort Care    DISPOSITION:  [x] ICU [] Stroke Unit [] Floor [] EMU [] RCU [] PCU    [x] Patient is at high risk of neurologic deterioration/death due to: ICA, MCA occlusion, acute stroke       Contact: [phone] ASSESSMENT/PLAN: R ICA-MCA occlusion; ?ICAD     NEURO:  Q1 hr neurochecks  stroke core measures  stroke neurology following  neuro IR following  MRI/MRA/NOVA pending  start heparin gtt without bolus goal PTT 50-70  Activity: [x] mobilize as tolerated [] Bedrest [x] PT [x] OT [] PMNR    PULM:  RA/IS  keep O2sat>92%    CV:  SBP goal 120-180  fluid boluses prn  arterial line placement  phenylephrine prn  atorvastatin 80mg   EKG  TTE w/ bubbles     RENAL:  Fluids: IVF     GI:  Diet: NPO for now   GI prophylaxis [x] not indicated [] PPI [] other:  Bowel regimen [] colace [x] senna [x] other: miralax     ENDO:   Goal euglycemia (-180)  A1C, TFTs check  AKBAR     HEME/ONC:  VTE prophylaxis: [x] SCDs [] chemoprophylaxis --start heparin gtt     ID: monitor for fevers     MISC:    SOCIAL/FAMILY:  [] awaiting [x] updated at bedside --son and wife [] family meeting    CODE STATUS:  [x] Full Code [] DNR [] DNI [] Palliative/Comfort Care    DISPOSITION:  [x] ICU [] Stroke Unit [] Floor [] EMU [] RCU [] PCU    [x] Patient is at high risk of neurologic deterioration/death due to: ICA, MCA occlusion, acute stroke       Contact: [phone]

## 2025-05-05 NOTE — PATIENT PROFILE ADULT - FALL HARM RISK - HARM RISK INTERVENTIONS
Assistance with ambulation/Assistance OOB with selected safe patient handling equipment/Communicate Risk of Fall with Harm to all staff/Discuss with provider need for PT consult/Monitor gait and stability/Reinforce activity limits and safety measures with patient and family/Tailored Fall Risk Interventions/Visual Cue: Yellow wristband and red socks/Bed in lowest position, wheels locked, appropriate side rails in place/Call bell, personal items and telephone in reach/Instruct patient to call for assistance before getting out of bed or chair/Non-slip footwear when patient is out of bed/[place] to call system/Physically safe environment - no spills, clutter or unnecessary equipment/Purposeful Proactive Rounding/Room/bathroom lighting operational, light cord in reach

## 2025-05-05 NOTE — ED PROVIDER NOTE - PROGRESS NOTE DETAILS
Dada Gomez DO (PGY3)  patient with right ICA occlusion - neurology at bedside. Attending Rajani: neuro discussion w/ neurosurg/neuro IR for disposition. likely NSCU. IVF ordered to help augment blood pressure as currently goal is permissive hypertension to allow perfusion from collateral circulation. Dada Gomez DO (PGY3)  patient to be admitted to NSCU

## 2025-05-06 ENCOUNTER — RESULT REVIEW (OUTPATIENT)
Age: 65
End: 2025-05-06

## 2025-05-06 LAB
A1C WITH ESTIMATED AVERAGE GLUCOSE RESULT: 5.8 % — HIGH (ref 4–5.6)
ANION GAP SERPL CALC-SCNC: 12 MMOL/L — SIGNIFICANT CHANGE UP (ref 5–17)
APTT BLD: 44.1 SEC — HIGH (ref 26.1–36.8)
APTT BLD: 46.3 SEC — HIGH (ref 26.1–36.8)
APTT BLD: 50.1 SEC — HIGH (ref 26.1–36.8)
APTT BLD: 55.3 SEC — HIGH (ref 26.1–36.8)
BLD GP AB SCN SERPL QL: NEGATIVE — SIGNIFICANT CHANGE UP
BUN SERPL-MCNC: 11 MG/DL — SIGNIFICANT CHANGE UP (ref 7–23)
CALCIUM SERPL-MCNC: 8.3 MG/DL — LOW (ref 8.4–10.5)
CHLORIDE SERPL-SCNC: 105 MMOL/L — SIGNIFICANT CHANGE UP (ref 96–108)
CHOLEST SERPL-MCNC: 96 MG/DL — SIGNIFICANT CHANGE UP
CO2 SERPL-SCNC: 22 MMOL/L — SIGNIFICANT CHANGE UP (ref 22–31)
CREAT SERPL-MCNC: 0.75 MG/DL — SIGNIFICANT CHANGE UP (ref 0.5–1.3)
EGFR: 100 ML/MIN/1.73M2 — SIGNIFICANT CHANGE UP
EGFR: 100 ML/MIN/1.73M2 — SIGNIFICANT CHANGE UP
ESTIMATED AVERAGE GLUCOSE: 120 MG/DL — HIGH (ref 68–114)
GLUCOSE SERPL-MCNC: 114 MG/DL — HIGH (ref 70–99)
HCT VFR BLD CALC: 37.2 % — LOW (ref 39–50)
HCT VFR BLD CALC: 37.6 % — LOW (ref 39–50)
HDLC SERPL-MCNC: 22 MG/DL — LOW
HGB BLD-MCNC: 12.1 G/DL — LOW (ref 13–17)
HGB BLD-MCNC: 12.2 G/DL — LOW (ref 13–17)
INR BLD: 1.13 RATIO — SIGNIFICANT CHANGE UP (ref 0.85–1.16)
LDLC SERPL-MCNC: 60 MG/DL — SIGNIFICANT CHANGE UP
LIPID PNL WITH DIRECT LDL SERPL: 60 MG/DL — SIGNIFICANT CHANGE UP
MAGNESIUM SERPL-MCNC: 2 MG/DL — SIGNIFICANT CHANGE UP (ref 1.6–2.6)
MCHC RBC-ENTMCNC: 29.2 PG — SIGNIFICANT CHANGE UP (ref 27–34)
MCHC RBC-ENTMCNC: 29.2 PG — SIGNIFICANT CHANGE UP (ref 27–34)
MCHC RBC-ENTMCNC: 32.4 G/DL — SIGNIFICANT CHANGE UP (ref 32–36)
MCHC RBC-ENTMCNC: 32.5 G/DL — SIGNIFICANT CHANGE UP (ref 32–36)
MCV RBC AUTO: 89.9 FL — SIGNIFICANT CHANGE UP (ref 80–100)
MCV RBC AUTO: 90 FL — SIGNIFICANT CHANGE UP (ref 80–100)
NONHDLC SERPL-MCNC: 74 MG/DL — SIGNIFICANT CHANGE UP
NRBC BLD AUTO-RTO: 0 /100 WBCS — SIGNIFICANT CHANGE UP (ref 0–0)
NRBC BLD AUTO-RTO: 0 /100 WBCS — SIGNIFICANT CHANGE UP (ref 0–0)
PHOSPHATE SERPL-MCNC: 2.6 MG/DL — SIGNIFICANT CHANGE UP (ref 2.5–4.5)
PLATELET # BLD AUTO: 303 K/UL — SIGNIFICANT CHANGE UP (ref 150–400)
PLATELET # BLD AUTO: 311 K/UL — SIGNIFICANT CHANGE UP (ref 150–400)
POTASSIUM SERPL-MCNC: 3.9 MMOL/L — SIGNIFICANT CHANGE UP (ref 3.5–5.3)
POTASSIUM SERPL-SCNC: 3.9 MMOL/L — SIGNIFICANT CHANGE UP (ref 3.5–5.3)
PROTHROM AB SERPL-ACNC: 12.9 SEC — SIGNIFICANT CHANGE UP (ref 9.9–13.4)
RBC # BLD: 4.14 M/UL — LOW (ref 4.2–5.8)
RBC # BLD: 4.18 M/UL — LOW (ref 4.2–5.8)
RBC # FLD: 12.9 % — SIGNIFICANT CHANGE UP (ref 10.3–14.5)
RBC # FLD: 12.9 % — SIGNIFICANT CHANGE UP (ref 10.3–14.5)
RH IG SCN BLD-IMP: POSITIVE — SIGNIFICANT CHANGE UP
SODIUM SERPL-SCNC: 139 MMOL/L — SIGNIFICANT CHANGE UP (ref 135–145)
T3 SERPL-MCNC: 111 NG/DL — SIGNIFICANT CHANGE UP (ref 80–200)
T4 AB SER-ACNC: 6.9 UG/DL — SIGNIFICANT CHANGE UP (ref 4.6–12)
TRIGL SERPL-MCNC: 62 MG/DL — SIGNIFICANT CHANGE UP
TSH SERPL-MCNC: 1.46 UIU/ML — SIGNIFICANT CHANGE UP (ref 0.27–4.2)
WBC # BLD: 8.16 K/UL — SIGNIFICANT CHANGE UP (ref 3.8–10.5)
WBC # BLD: 8.59 K/UL — SIGNIFICANT CHANGE UP (ref 3.8–10.5)
WBC # FLD AUTO: 8.16 K/UL — SIGNIFICANT CHANGE UP (ref 3.8–10.5)
WBC # FLD AUTO: 8.59 K/UL — SIGNIFICANT CHANGE UP (ref 3.8–10.5)

## 2025-05-06 PROCEDURE — 99291 CRITICAL CARE FIRST HOUR: CPT

## 2025-05-06 PROCEDURE — 70547 MR ANGIOGRAPHY NECK W/O DYE: CPT | Mod: 26

## 2025-05-06 PROCEDURE — 70544 MR ANGIOGRAPHY HEAD W/O DYE: CPT | Mod: 26,59

## 2025-05-06 PROCEDURE — 93306 TTE W/DOPPLER COMPLETE: CPT | Mod: 26

## 2025-05-06 PROCEDURE — 70551 MRI BRAIN STEM W/O DYE: CPT | Mod: 26

## 2025-05-06 RX ORDER — POLYETHYLENE GLYCOL 3350 17 G/17G
17 POWDER, FOR SOLUTION ORAL
Refills: 0 | Status: DISCONTINUED | OUTPATIENT
Start: 2025-05-06 | End: 2025-05-21

## 2025-05-06 RX ORDER — ACETAMINOPHEN 500 MG/5ML
1000 LIQUID (ML) ORAL EVERY 6 HOURS
Refills: 0 | Status: DISCONTINUED | OUTPATIENT
Start: 2025-05-06 | End: 2025-05-12

## 2025-05-06 RX ORDER — POTASSIUM PHOSPHATE, MONOBASIC POTASSIUM PHOSPHATE, DIBASIC INJECTION, 236; 224 MG/ML; MG/ML
30 SOLUTION, CONCENTRATE INTRAVENOUS ONCE
Refills: 0 | Status: COMPLETED | OUTPATIENT
Start: 2025-05-06 | End: 2025-05-06

## 2025-05-06 RX ADMIN — SODIUM CHLORIDE 100 MILLILITER(S): 9 INJECTION, SOLUTION INTRAVENOUS at 18:12

## 2025-05-06 RX ADMIN — ATORVASTATIN CALCIUM 80 MILLIGRAM(S): 80 TABLET, FILM COATED ORAL at 22:25

## 2025-05-06 RX ADMIN — HEPARIN SODIUM 11.5 UNIT(S)/HR: 1000 INJECTION INTRAVENOUS; SUBCUTANEOUS at 16:00

## 2025-05-06 RX ADMIN — Medication 2 TABLET(S): at 22:25

## 2025-05-06 RX ADMIN — HEPARIN SODIUM 11 UNIT(S)/HR: 1000 INJECTION INTRAVENOUS; SUBCUTANEOUS at 02:00

## 2025-05-06 RX ADMIN — NOREPINEPHRINE BITARTRATE 7.23 MICROGRAM(S)/KG/MIN: 8 SOLUTION at 07:00

## 2025-05-06 RX ADMIN — POTASSIUM PHOSPHATE, MONOBASIC POTASSIUM PHOSPHATE, DIBASIC INJECTION, 83.33 MILLIMOLE(S): 236; 224 SOLUTION, CONCENTRATE INTRAVENOUS at 00:00

## 2025-05-06 RX ADMIN — Medication 20 MILLIEQUIVALENT(S): at 05:34

## 2025-05-06 NOTE — SPEECH LANGUAGE PATHOLOGY EVALUATION - COMMENTS
***Pt is not previously known to this service and no prior swallow studies in PACS. SLP d/w TREV Eagle, TAVARES Bolton, and TAVARES Cramer N/A No evidence of expressive language deficits No evidence of receptive language deficits Unable to assess 2/2 Pt Persian speaking Pt noted w/ difficulty performing working memory tasks including simple math questions and digital span in reverse; able to recall information, however, difficulty w/ mental manipulation; benefitted from breakdown of tasks WNL; Pt/family deny changes to vocal quality Unable to assess 2/2 Pt Latvian speaking

## 2025-05-06 NOTE — PROGRESS NOTE ADULT - ASSESSMENT
ASSESSMENT/PLAN: R ICA-MCA occlusion; ?ICAD     NEURO:  Q1 hr neurochecks  stroke core measures  stroke neurology following  neuro IR following  MRI/MRA/NOVA pending  start heparin gtt without bolus goal PTT 50-70  Activity: [x] mobilize as tolerated [] Bedrest [x] PT [x] OT [] PMNR    PULM:  RA/IS  keep O2sat>92%    CV:  SBP goal 120-180  fluid boluses prn  arterial line placement  phenylephrine prn  atorvastatin 80mg   EKG  TTE w/ bubbles     RENAL:  Fluids: IVF     GI:  Diet: NPO for now   GI prophylaxis [x] not indicated [] PPI [] other:  Bowel regimen [] colace [x] senna [x] other: miralax     ENDO:   Goal euglycemia (-180)  A1C, TFTs check  AKBAR     HEME/ONC:  VTE prophylaxis: [x] SCDs [] chemoprophylaxis --start heparin gtt     ID: monitor for fevers     MISC:    SOCIAL/FAMILY:  [] awaiting [x] updated at bedside --son and wife [] family meeting    CODE STATUS:  [x] Full Code [] DNR [] DNI [] Palliative/Comfort Care    DISPOSITION:  [x] ICU [] Stroke Unit [] Floor [] EMU [] RCU [] PCU    [x] Patient is at high risk of neurologic deterioration/death due to: ICA, MCA occlusion, acute stroke       Contact: [phone] ASSESSMENT/PLAN: R ICA-MCA occlusion; ?ICAD     NEURO:  - Q1 hr neurochecks  - Stroke core measures  - MRI/MRA/NOVA pending  - Heparin gtt  PTT 50-70 for Right ICA occlusion  Pain: Tylenol PRN  Activity: [x] mobilize as tolerated [] Bedrest [x] PT [x] OT [] PMNR    PULM:  - RA/IS  - Keep O2sat>92%    CV:  - SBP goal 120-180  - Fluid boluses prn  - Levo prn  - Lipid wnl.   - Atorvastatin 80mg.   - TTE w/ bubbles pending.     RENAL:  Fluids: Plasmalyte 100cc/hr     GI:  Diet: NPO for now   GI prophylaxis [x] not indicated [] PPI [] other:  Bowel regimen [] colace [x] senna [x] other: miralax     ENDO:   Goal euglycemia (-180)  A1C 5.8  AKBAR     HEME/ONC:  VTE prophylaxis: SCDs, heparin gtt  BLE Dopp no DVTs.    ID: monitor for fevers     MISC:    SOCIAL/FAMILY:  [] awaiting [x] updated at bedside --son and wife [] family meeting    CODE STATUS:  [x] Full Code [] DNR [] DNI [] Palliative/Comfort Care    DISPOSITION:  [x] ICU [] Stroke Unit [] Floor [] EMU [] RCU [] PCU    [x] Patient is at high risk of neurologic deterioration/death due to: ICA, MCA occlusion, acute stroke       Contact: [phone]

## 2025-05-06 NOTE — SPEECH LANGUAGE PATHOLOGY EVALUATION - SLP DIAGNOSIS
Consult received and appreciated. Chart reviewed. Pt is currently off the unit for MRI per RN. This service will re-attempt at a later time. 64y M presenting as a code stroke. Found to have R ICA occlusion. Pt presents w/ mild cognitive deficits noted in the areas of working memory and inhibition. Per d/w Pt and family, current deficits are consistent w/ Pt's baseline status; deny changes to cognitive skills. No evidence of aphasia or dysarthria. Naming, repetition, automatic speech, and comprehension are intact. Speech is clear. Given Pt is at baseline status, no further ST needs noted. This service will sign off at this time. Re-consult for change in status or as medically appropriate.

## 2025-05-06 NOTE — SPEECH LANGUAGE PATHOLOGY EVALUATION - OPEN ENDED QUESTIONS
Pt provided appropriate responses to questions re: family, current living situation, and prior vocation/intact

## 2025-05-06 NOTE — SPEECH LANGUAGE PATHOLOGY EVALUATION - SLP ABLE TO FOLLOW COMMANDS
Pt occasionally attempting to complete multistep commands prior to  completion (likely iso impulsivity); able to follow complex multi-step commands correctly when allowing for  to complete verbal commands/within functional limits

## 2025-05-06 NOTE — SPEECH LANGUAGE PATHOLOGY EVALUATION - SLP PERTINENT HISTORY OF CURRENT PROBLEM
64y M w/ PMHx of HTN and HLD who presents as a code stroke for L-sided weakness. In AM, Pt felt LUE/LLE weakness w/ episode of speech disturbance (now resolved). Pt also endorses intermittent numbness of LUE/LLE over the past few months. NIHSS 4, preMRS 0. Not a tenecteplase candidate outside of the time window. Not a thrombectomy candidate given likely chronic occlusion per neurointerventional team. Found to have R ICA occlusion. Neuro exam c/f fluctuating L hemiparesis. MRI/MRA w/ NOVA pending. TTE pending.

## 2025-05-06 NOTE — SPEECH LANGUAGE PATHOLOGY EVALUATION - SLP GENERAL OBSERVATIONS
Encountered Pt sitting OOB to chair on RA. Wife present at bedside.  utilized throughout this encounter. Pt is A&Ox4, verbally responsive, and able to follow simple commands.

## 2025-05-06 NOTE — SPEECH LANGUAGE PATHOLOGY EVALUATION - SLP BEHAVIORAL OBSERVATIONS
Mild impulsivity noted; attempting to follow commands and speaking before  had completed speaking/within normal limits/alert

## 2025-05-06 NOTE — SPEECH LANGUAGE PATHOLOGY EVALUATION - SLP CRITERIA FOR SKILLED THERAPEUTIC INTERVENTIONS MET
Gastroenterology Progress Note    Loren Noyola is a 88 y.o. female patient.  Hospitalization Day:7    Chief C/O: GIB    SUBJECTIVE: Patient reports brown bowel movement overnight soft to formed in consistency.  She denies any blood nor mucus in stool.  Patient tolerating regular diet without difficulty.  Patient denies any nausea vomiting nor abdominal pain.    ROS:  Gastrointestinal ROS: no abdominal pain, change in bowel habits, or black or bloody stools    Previous Endoscopic Evaluation:  EGD 2024, Rylie Madrid  Normal esophagus.  Z-line regular, 35 cm from the incisors.  Gastroesophageal flap valve classified as Hill Grade II (fold present, opens with respiration).  Atrophic mucosa in the gastric fundus and gastric body. Previously documented ulcer seen in the prepyloric area and the ulcer was measuring about 8 mm in size and this was a superficial ulcer and no stigmata of recent bleed.  Normal examined duodenum.  No specimens collected.    EGD 2024, Rylie Yates  Normal esophagus.  Non-bleeding gastric ulcers with a clean ulcer base (Flavio Class III).  Gastric mucosal atrophy.  Biopsied.  Normal examined duodenum. Lax lower esophageal sphincter on retroflexed views. No old or fresh blood seen.    Physical    VITALS:  BP (!) 126/56   Pulse 69   Temp 98.1 °F (36.7 °C) (Oral)   Resp 20   Ht 1.6 m (5' 3\")   Wt 85.9 kg (189 lb 4.8 oz)   SpO2 99%   BMI 33.53 kg/m²   TEMPERATURE:  Current - Temp: 98.1 °F (36.7 °C); Max - Temp  Av.1 °F (36.7 °C)  Min: 98.1 °F (36.7 °C)  Max: 98.1 °F (36.7 °C)    General -no acute distress  Eyes -no icterus, no pallor  Cardiovascular - RRR, no murmur  Lungs -clear to auscultation bilaterally  Abdomen - non-distended,  non-tender, no organomegaly, Bowel sounds normal  Extremities -no edema  Skin -warm and dry  Neuro: No asterixis     Data    Data Review:    Recent Labs     24  0445 24  0439 24  0443   WBC 10.4 10.6 10.4  no problems identified which require skilled intervention

## 2025-05-06 NOTE — PROGRESS NOTE ADULT - SUBJECTIVE AND OBJECTIVE BOX
SUMMARY:  64M, PMHx HTN and HLD not on AC/AP presenting to [facility] as a code stroke for left sided weakness. LKW 7AM 5/5. Patient then felt LUE/LLE weakness. Had an episode of speech disturbance also around 9:30 AM, that resolved after unknown duration of time. Also, over the past few months, patient has had numbness in LUE/LLE coming and going. Denies HA, vision changes.    ICU COURSE:  5/5: admitted to NSCU for hemodynamic monitoring to see if neurological exam is pressure dependent.    ADMISSION SCORES:   GCS: 15    REVIEW OF SYSTEMS: None other than stated in HPI    ALLERGIES: Allergies    No Known Allergies    Intolerances        VITALS/DATA/ORDERS:    T(C): 37.1 (05-06-25 @ 07:00), Max: 37.2 (05-05-25 @ 23:00)  HR: 66 (05-06-25 @ 08:15) (57 - 71)  BP: 119/70 (05-05-25 @ 18:00) (113/77 - 140/76)  RR: 17 (05-06-25 @ 08:15) (11 - 30)  SpO2: 95% (05-06-25 @ 08:15) (95% - 100%)                          12.1   8.59  )-----------( 311      ( 06 May 2025 01:30 )             37.2       05-05    137  |  105  |  11  ----------------------------<  89  3.9   |  22  |  0.70    Ca    8.5      05 May 2025 17:42  Phos  2.2     05-05  Mg     2.0     05-05    TPro  7.3  /  Alb  4.2  /  TBili  0.3  /  DBili  x   /  AST  33  /  ALT  33  /  AlkPhos  103  05-05              PT/INR - ( 05 May 2025 12:33 )   PT: 12.6 sec;   INR: 1.10 ratio         PTT - ( 06 May 2025 08:10 )  PTT:55.3 sec            05-05-25 @ 07:01  -  05-06-25 @ 07:00  --------------------------------------------------------  IN: 2602.2 mL / OUT: 2250 mL / NET: 352.2 mL        acetaminophen     Tablet .. 650 milliGRAM(s) Oral every 6 hours PRN  acetaminophen   IVPB .. 750 milliGRAM(s) IV Intermittent every 6 hours PRN  atorvastatin 80 milliGRAM(s) Oral at bedtime  heparin  Infusion 1000 Unit(s)/Hr IV Continuous <Continuous>  multiple electrolytes Injection Type 1 1000 milliLiter(s) IV Continuous <Continuous>  norepinephrine Infusion 0.05 MICROgram(s)/kG/Min IV Continuous <Continuous>  senna 2 Tablet(s) Oral at bedtime      EXAMINATION:  General: No acute distress  HEENT: Anicteric sclerae  Cardiac: [id number]  Lungs: Clear  Abdomen: Soft, non-tender, +BS  Extremities: No c/c/e  Skin/Incision Site: Clean, dry and intact  Neurologic: Awake, alert, fully oriented, follows commands, PERRL, VFFtc, EOMI, face symmetric, tongue midline, no drift, full strength

## 2025-05-06 NOTE — PROGRESS NOTE ADULT - SUBJECTIVE AND OBJECTIVE BOX
Patient seen and examined at bedside.    --Anticoagulation--  heparin  Infusion 1000 Unit(s)/Hr IV Continuous <Continuous>    T(C): 37.2 (05-05-25 @ 23:00), Max: 37.2 (05-05-25 @ 23:00)  HR: 66 (05-05-25 @ 23:45) (58 - 70)  BP: 119/70 (05-05-25 @ 18:00) (113/77 - 140/76)  RR: 13 (05-05-25 @ 23:45) (11 - 30)  SpO2: 96% (05-05-25 @ 23:45) (95% - 100%)  Wt(kg): --    Exam: AOx3, EOMI, trace Lt facial, no drift, DIAZ 5/5 (Rt fingers amputated)

## 2025-05-07 LAB
APTT BLD: 50.4 SEC — HIGH (ref 26.1–36.8)
TROPONIN T, HIGH SENSITIVITY RESULT: 8 NG/L — SIGNIFICANT CHANGE UP (ref 0–51)

## 2025-05-07 PROCEDURE — 99291 CRITICAL CARE FIRST HOUR: CPT

## 2025-05-07 PROCEDURE — 93010 ELECTROCARDIOGRAM REPORT: CPT

## 2025-05-07 RX ADMIN — HEPARIN SODIUM 11.5 UNIT(S)/HR: 1000 INJECTION INTRAVENOUS; SUBCUTANEOUS at 07:15

## 2025-05-07 RX ADMIN — POLYETHYLENE GLYCOL 3350 17 GRAM(S): 17 POWDER, FOR SOLUTION ORAL at 17:13

## 2025-05-07 RX ADMIN — POLYETHYLENE GLYCOL 3350 17 GRAM(S): 17 POWDER, FOR SOLUTION ORAL at 01:29

## 2025-05-07 RX ADMIN — POTASSIUM PHOSPHATE, MONOBASIC POTASSIUM PHOSPHATE, DIBASIC INJECTION, 83.33 MILLIMOLE(S): 236; 224 SOLUTION, CONCENTRATE INTRAVENOUS at 00:00

## 2025-05-07 RX ADMIN — ATORVASTATIN CALCIUM 80 MILLIGRAM(S): 80 TABLET, FILM COATED ORAL at 21:18

## 2025-05-07 RX ADMIN — Medication 2 TABLET(S): at 21:18

## 2025-05-07 RX ADMIN — NOREPINEPHRINE BITARTRATE 7.23 MICROGRAM(S)/KG/MIN: 8 SOLUTION at 07:25

## 2025-05-07 RX ADMIN — SODIUM CHLORIDE 100 MILLILITER(S): 9 INJECTION, SOLUTION INTRAVENOUS at 07:25

## 2025-05-07 RX ADMIN — Medication 20 MILLIEQUIVALENT(S): at 01:29

## 2025-05-07 RX ADMIN — HEPARIN SODIUM 11.5 UNIT(S)/HR: 1000 INJECTION INTRAVENOUS; SUBCUTANEOUS at 19:32

## 2025-05-07 RX ADMIN — NOREPINEPHRINE BITARTRATE 7.23 MICROGRAM(S)/KG/MIN: 8 SOLUTION at 19:32

## 2025-05-07 RX ADMIN — SODIUM CHLORIDE 100 MILLILITER(S): 9 INJECTION, SOLUTION INTRAVENOUS at 19:32

## 2025-05-07 NOTE — OCCUPATIONAL THERAPY INITIAL EVALUATION ADULT - PERTINENT HX OF CURRENT PROBLEM, REHAB EVAL
64 LHM PMHx HTN and HLD not on AC/AP presenting to [facility] as a code stroke for left sided weakness. LKW 7AM 5/5. Patient then felt LUE/LLE weakness. Had an episode of speech disturbance also around 9:30 AM, that resolved after unknown duration of time. Also, over the past few months, patient has had numbness in LUE/LLE coming and going. Denies HA, vision changes. Denies prior history of stroke. BP on presentation 113/77. On 5/5: admitted to NSCU for hemodynamic monitoring to see if neurological exam is pressure dependent.CTA brain:Moderate segment stenosis of the proximal supraclinoid left ICA. Milder stenosis of the cavernous left ICA. Normal flow-related enhancement of the more proximal skull base and distal intracranial left ICA.  Lack of flow-related enhancement of the skull base/intracranial right ICA.Lack of flow related enhancement of the proximal and mid right M1 segment. More distal right M1 segment is small in caliber anddemonstrates minimal enhancement.Poor flow related enhancement of the proximal right M2 segments. There is flow-related enhancement of the more distal right MCA.Moderate to severe stenosis of the proximal intradural right vertebral artery with normal flow-related enhancement of the more distal vessel.Mild to moderate stenosis of the proximal intradural left vertebral artery with normal flow-related enhancement of the more distal vessel. CTA neck:Poor visualization of the origin of the right internal carotid artery. The proximal segment partially reconstitutes and rapidly progresses to occlusion in the setting of partially calcified plaque. The presence of acute dissection is not excluded. No flow related enhancement of the more distal right ICA in the neck. MRI HEAD: 4.  ANTERIOR INTRACRANIAL CIRCULATION:     Right internal carotid arterial occlusion. Right middle cerebral arterial occlusion. The right   anterior cerebral artery is perfused via left-to-right anterior collateral circulation via patent anterior communicating artery. Left internal carotid clinoid segment focal stenosis, moderate  5. POSTERIOR INTRACRANIAL CIRCULATION:   Intracranial atherosclerosis each vertebral artery, severe, in each posterior cerebral artery, mild to moderate on the right and moderate to severe on the left.6.BRAIN:    Scattered foci of acute infarction within the right cerebral hemisphere have anterior to posterior parasagittal distribution suggesting low-flow/watershed mechanism. The largest component is within the right posterior putamen, bordering remote hemorrhagic infarction within the anterior putamen. Additional small cortical foci are noted in the right parietal lobe and subcortical foci within the right frontal and parietal lobes.  VA LEs: No acute DVT of the right or left lower extremity.

## 2025-05-07 NOTE — DIETITIAN INITIAL EVALUATION ADULT - OTHER INFO
-R ICA-MCA occlusion  -Possible angiogram pending   -Levophed @ 0.05micrograms/kG/min for pressor support

## 2025-05-07 NOTE — DIETITIAN INITIAL EVALUATION ADULT - PERTINENT MEDS FT
MEDICATIONS  (STANDING):  atorvastatin 80 milliGRAM(s) Oral at bedtime  heparin  Infusion 1000 Unit(s)/Hr (11.5 mL/Hr) IV Continuous <Continuous>  multiple electrolytes Injection Type 1 1000 milliLiter(s) (100 mL/Hr) IV Continuous <Continuous>  norepinephrine Infusion 0.05 MICROgram(s)/kG/Min (7.23 mL/Hr) IV Continuous <Continuous>  polyethylene glycol 3350 17 Gram(s) Oral two times a day  senna 2 Tablet(s) Oral at bedtime    MEDICATIONS  (PRN):  acetaminophen     Tablet .. 650 milliGRAM(s) Oral every 6 hours PRN Temp greater or equal to 38C (100.4F), Mild Pain (1 - 3)  acetaminophen   IVPB .. 1000 milliGRAM(s) IV Intermittent every 6 hours PRN Mild Pain (1 - 3)

## 2025-05-07 NOTE — DIETITIAN INITIAL EVALUATION ADULT - REASON INDICATOR FOR ASSESSMENT
ICU length of Stay  Information obtained from: Review of pt's current medical record, interview with pt using  services, and  interdisciplinary rounds

## 2025-05-07 NOTE — OCCUPATIONAL THERAPY INITIAL EVALUATION ADULT - ADDITIONAL COMMENTS
PTA pt reports living with wife in a PH , no ANNEL + 1 flight of steps inside. Pt reports being independent with all ADLs/mobility prior.

## 2025-05-07 NOTE — DIETITIAN INITIAL EVALUATION ADULT - NS FNS DIET ORDER
Diet, NPO after Midnight:      NPO Start Date: 06-May-2025,   NPO Start Time: 23:59  Except Medications (05-06-25 @ 15:18)  Diet, Consistent Carbohydrate/No Snacks (05-06-25 @ 13:00)

## 2025-05-07 NOTE — PROGRESS NOTE ADULT - SUBJECTIVE AND OBJECTIVE BOX
HOSPITAL COURSE: 65yo Tajik-speaking man, left-handed, HTN, HLD, brought to ED for L sided weakness. Per family at bedside, he's been increasingly leaning towards left especially in the middle of the night getting up to use restroom or early morning waking up.  Per stroke H/P, LKW 7AM 5/5. Patient then felt LUE/LLE weakness. Had an episode of speech disturbance also around 9:30 AM, that resolved after unknown duration of time. Also, over the past few months, patient has had numbness in LUE/LLE coming and going. Denies HA, vision changes. Denies prior history of stroke. BP on presentation 113/77    NIHSS:4  preMRS:0    Not a tenecteplase candidate outside of the time window.   Not a thrombectomy candidate given likely chronic occlusion per neurointerventional team       Allergies    No Known Allergies    Intolerances        REVIEW OF SYSTEMS: [ ] Unable to Assess due to neurologic exam   [ x] All ROS addressed below are non-contributory, except:  Neuro: [ ] Headache [ ] Back pain [x ] Numbness [x ] Weakness [ ] Ataxia [ ] Dizziness [ ] Aphasia [ x] Dysarthria [ ] Visual disturbance  Resp: [ ] Shortness of breath/dyspnea, [ ] Orthopnea [ ] Cough  CV: [ ] Chest pain [ ] Palpitation [ ] Lightheadedness [ ] Syncope  Renal: [ ] Thirst [ ] Edema  GI: [ ] Nausea [ ] Emesis [ ] Abdominal pain [ ] Constipation [ ] Diarrhea  Hem: [ ] Hematemesis [ ] bright red blood per rectum  ID: [ ] Fever [ ] Chills [ ] Dysuria  ENT: [ ] Rhinorrhea      DEVICES:   [ ] Restraints [ ] ET tube [ ] central line [x ] arterial line --to be placed at bedside [ ] lomax [ ] NGT/OGT [ ] EVD [ ] LD [ ] MATTHIEU/HMV [ ] Trach [ ] PEG [ ] Chest Tube     VITALS:   ICU Vital Signs Last 24 Hrs  T(C): 37.8 (06 May 2025 19:00), Max: 37.8 (06 May 2025 19:00)  T(F): 100 (06 May 2025 19:00), Max: 100 (06 May 2025 19:00)  HR: 71 (06 May 2025 22:00) (53 - 89)  BP: --  BP(mean): --  ABP: 126/62 (06 May 2025 22:00) (124/61 - 173/80)  ABP(mean): 83 (06 May 2025 22:00) (80 - 115)  RR: 24 (06 May 2025 22:00) (14 - 28)  SpO2: 98% (06 May 2025 22:00) (94% - 100%)    O2 Parameters below as of 06 May 2025 19:00  Patient On (Oxygen Delivery Method): room air          Constitutional: No Acute Distress   Neurological:   Awake, alert oriented to person, place and time, Following Commands, PERRL, EOMI, No Gaze Preference, Face Symmetrical, Speech Fluent, +L extinction   Motor exam: 5/5 throughout                                                Sensation: [x ] intact to light touch  [ ] decreased:   Pulmonary: Clear to Auscultation, No rales, No rhonchi, No wheezes     Cardiovascular: S1, S2, Regular rate and rhythm     Gastrointestinal: Soft, Non-tender, Non-distended     Extremities: No calf tenderness     Incision:    HPI  65yo Georgian-speaking man, left-handed, HTN, HLD, brought to ED for L sided weakness. Per family at bedside, he's been increasingly leaning towards left especially in the middle of the night getting up to use restroom or early morning waking up.  Per stroke H/P, LKW 7AM 5/5. Patient then felt LUE/LLE weakness. Had an episode of speech disturbance also around 9:30 AM, that resolved after unknown duration of time. Also, over the past few months, patient has had numbness in LUE/LLE coming and going. Denies HA, vision changes. Denies prior history of stroke. BP on presentation 113/77      Interval events:  No acute events. Remains on heparin.     VITALS:   ICU Vital Signs Last 24 Hrs  T(C): 37.8 (06 May 2025 19:00), Max: 37.8 (06 May 2025 19:00)  T(F): 100 (06 May 2025 19:00), Max: 100 (06 May 2025 19:00)  HR: 71 (06 May 2025 22:00) (53 - 89)  BP: --  BP(mean): --  ABP: 126/62 (06 May 2025 22:00) (124/61 - 173/80)  ABP(mean): 83 (06 May 2025 22:00) (80 - 115)  RR: 24 (06 May 2025 22:00) (14 - 28)  SpO2: 98% (06 May 2025 22:00) (94% - 100%)    O2 Parameters below as of 06 May 2025 19:00  Patient On (Oxygen Delivery Method): room air      Constitutional: No Acute Distress   Pulmonary: Clear to Auscultation, No rales, No rhonchi, No wheezes   Cardiovascular: S1, S2, Regular rate and rhythm   Gastrointestinal: Soft, Non-tender, Non-distended   Extremities: No calf tenderness   Neurological:   Awake, alert oriented to person, place and time, Following Commands, PERRL, EOMI, No Gaze Preference, Face Symmetrical, Speech Fluent,  5/5 throughout, sensation grossly intact to LT throughout

## 2025-05-07 NOTE — PROGRESS NOTE ADULT - ASSESSMENT
ASSESSMENT/PLAN: R ICA-MCA occlusion; ?ICAD     NEURO:  Q1 hr neurochecks  stroke core measures  stroke neurology following  neuro IR following  MRI/MRA/NOVA pending  start heparin gtt without bolus goal PTT 50-70  Activity: [x] mobilize as tolerated [] Bedrest [x] PT [x] OT [] PMNR    PULM:  RA/IS  keep O2sat>92%    CV:  SBP goal 120-180  fluid boluses prn  arterial line placement  phenylephrine prn  atorvastatin 80mg   EKG  TTE w/ bubbles     RENAL:  Fluids: IVF     GI:  Diet: NPO for now   GI prophylaxis [x] not indicated [] PPI [] other:  Bowel regimen [] colace [x] senna [x] other: miralax     ENDO:   Goal euglycemia (-180)  A1C, TFTs check  AKBAR     HEME/ONC:  VTE prophylaxis: [x] SCDs [] chemoprophylaxis --start heparin gtt     ID: monitor for fevers     MISC:    SOCIAL/FAMILY:  [] awaiting [x] updated at bedside --son and wife [] family meeting    CODE STATUS:  [x] Full Code [] DNR [] DNI [] Palliative/Comfort Care    DISPOSITION:  [x] ICU [] Stroke Unit [] Floor [] EMU [] RCU [] PCU    [x] Patient is at high risk of neurologic deterioration/death due to: ICA, MCA occlusion, acute stroke       Contact: [phone] ASSESSMENT/PLAN: R ICA-MCA occlusion; ICAD     NEURO:  Q2 hr neurochecks  stroke core measures  stroke neurology following  neuro IR following  start heparin gtt without bolus goal PTT 50-70    PULM:  RA/IS  keep O2sat>92%    CV:  SBP goal 120-180  arterial line placement  atorvastatin 80mg     RENAL:  Fluids: IVF     GI:  Diet: NPO    ENDO:   Goal euglycemia (-180)    HEME/ONC:  On therapeutic heparin    ID: no active issues. Monitor CBC and fever curve.

## 2025-05-07 NOTE — PRE-OP CHECKLIST - VERIFY SURGICAL SITE/SIDE WITH PATIENT
COLONOSCOPY: SUTAB     Re: Carmen Escobedo   [address] [place] [address]    Provider: Dexter Solomon MD    Your colon must be cleaned of stool to have a good view. You should follow these directions to have a successful colonoscopy.     Your exam is scheduled as an outpatient procedure on:     Day: Tuesday    Date: OCTOBER 15 2024    Arrival Time: 12:00 PM(You will receive a confirmation message 3 days before your appointment, if you do not receive a message or have questions, contact [phone] or visit the patient portal for details.). Be aware your procedure time is subject to change.)     You will be receiving sedation for your procedure and MUST have an adult over 18 to drive you home and be with you after procedure.     Location: [facility], [address] [place] [id number] - Directions: Come all the way into the main lobby of the building and take the interior elevator down to the lower level. Turn left off the elevator and walk straight ahead to the Endoscopy reception area.     You will need the following in order to complete your prep:                  1. SUTAB Bowel Prep QTY 24 TABLETS       2. Two Simethicone tablets (OVER THE COUNTER)       3. Two Dulcolax (Bisacodyl) tablets (OVER THE COUNTER)    Your prep kit has been sent to   [id number] - [place] [address] AT [facility] OF [address]  [address] [facility] [address]  Phone: [phone] Fax: [phone]  Please  the kit today. If not picked up within 7 days contact your pharmacy directly as prescription would been placed back and re-stock.        If you are diabetic: Not applicable        7 days before colonoscopy: Review your colonoscopy instructions. (Instructions can also be found on [employer] under the letters tab under communication)  • Stop eating popcorn, nuts, flax/sesame seeds, or any food that contains seeds        3 BUSINESS DAYS BEFORE  your procedure:  • Stop taking all anti-inflammatory medicines. These include Advil, Aleve, Naprosyn, (Tylenol is okay to take)    1 DAY BEFORE the procedure:     •Start a strict, clear liquid diet from the moment you wake. A clear liquid diet can include: Apple juice, white grape juice, and white cranberry juice; Beef or chicken broths that are clear - no noodles, vegetables, rice, etc.Tea and coffee without milk; -Soda pop, Gatorade, Pietro-Aid and various -Jell-O Flavors (any color except red or purple)  •Avoid: juices with pulp, milk, cream, solid food, alcohol, Gum, and hard candy.  6:00pm:  Open one bottle of 12 tablets.   Fill the provided container with 16 ounces of water (up to the fill line).   Swallow each tablet every 1 to 2 minutes with a sip of water and drink the entire amount over 20 minutes. You should finish the 12 tablets and the entire 16 ounces of water within 20 minutes  Approximately one hour after the last tablet is ingested, fill the provided container a second time with 16 ounces of water (up to the fill line) and drink the entire amount over 30 minutes.   Approximately 30 minutes after finishing the second container of water, fill the provided container again with 16 ounces of water (up to the fill line) and drink the entire amount over 30 minutes. Chew one Simethicone (GasX) tablet.   If you experience preparation-related symptoms (e.g. nausea, bloating, cramping), pause or slow the rate of drinking the additional water until symptoms diminish.  Continue to drink clear liquids throughout the evening but do not eat any solid food.  7 hours prior to arrival time: Open one bottle of 12 tablets.   Fill the provided container with 16 ounces of water (up to the fill line).   Swallow each tablet every 1 to 2 minutes with a sip of water and drink the entire amount over 20 minutes. You should finish the 12 tablets and the entire 16 ounces of water within 20 minutes  Approximately one hour after the  last tablet is ingested, fill the provided container a second time with 16 ounces of water (up to the fill line) and drink the entire amount over 30 minutes.   Approximately 30 minutes after finishing the second container of water, fill the provided container again with 16 ounces of water (up to the fill line) and drink the entire amount over 30 minutes. Chew one Simethicone (GasX) tablet and swallow two Dulcolax (Bisacodyl) tablets.  4 hours prior to your arrival time, STOP ALL LIQUIDS INCLUDING WATER AT THIS TIME.}    • Take your medications; buPROPion (WELLBUTRIN SR)  with a sip of water 4 hours prior to your arrival time. STOP ALL LIQUIDS INCLUDING WATER AT THIS TIME.    -Remove ALL jewelry and piercings (rings,necklaces, all body piercings, etc)  prior to arrival for procedure.    If you are still having solid/formed stools or trouble completing this preparation, please call the doctor's office at [phone].       done

## 2025-05-07 NOTE — DIETITIAN INITIAL EVALUATION ADULT - ORAL INTAKE PTA/DIET HISTORY
Pt reports good appetite at home. Ate coffee and bread for breakfast, rice, chicken and lettuce for lunch and dinner. Pt denies nausea, vomiting, diarrhea, or constipation. Denies difficulty chewing/swallowing. Per chart,  no known food allergies/intolerances

## 2025-05-07 NOTE — DIETITIAN INITIAL EVALUATION ADULT - ADD RECOMMEND
1. Continue Consistent Carbohydrate therapeutic dietary restriction  2. Encourage adequate PO intakes as tolerated. Honor food preferences as appropriate and available.  3. Monitor PO intake, GI tolerance, skin integrity and labs. RD remains available if needed.

## 2025-05-07 NOTE — DIETITIAN INITIAL EVALUATION ADULT - REASON FOR ADMISSION
Chart Reviewed, Events Noted  "65yo Citizen of [place]-speaking man, left-handed, HTN, HLD, brought to ED for L sided weakness."

## 2025-05-07 NOTE — PROGRESS NOTE ADULT - SUBJECTIVE AND OBJECTIVE BOX
Patient seen and examined at bedside.    --Anticoagulation--  heparin  Infusion 1000 Unit(s)/Hr IV Continuous <Continuous>    T(C): 37.8 (05-06-25 @ 19:00), Max: 37.8 (05-06-25 @ 19:00)  HR: 71 (05-06-25 @ 22:00) (53 - 89)  BP: --  RR: 24 (05-06-25 @ 22:00) (14 - 28)  SpO2: 98% (05-06-25 @ 22:00) (94% - 100%)  Wt(kg): --    Exam: AOx3, EOMI, no drift, DIAZ 5/5 (Rt fingers amputated)

## 2025-05-07 NOTE — PHYSICAL THERAPY INITIAL EVALUATION ADULT - PERTINENT HX OF CURRENT PROBLEM, REHAB EVAL
64 LHM PMHx HTN and HLD not on AC/AP presenting to [facility] as a code stroke for left sided weakness. LKW 7AM 5/5. Patient then felt LUE/LLE weakness. Had an episode of speech disturbance also around 9:30 AM, that resolved after unknown duration of time. Also, over the past few months, patient has had numbness in LUE/LLE coming and going. Denies HA, vision changes. Denies prior history of stroke. BP on presentation 113/77 NIHSS:4  -CTH neg. CTA w/ R ICA occlusion from bifurc to ICA term/M1. CTP w/ delayed TMax in R MCA territory.

## 2025-05-07 NOTE — DIETITIAN INITIAL EVALUATION ADULT - FLUID ACCUMULATION
-- No edema noted per flow sheets 
GERD (gastroesophageal reflux disease)    Herniation of intervertebral disc of lumbar region    Hiatal hernia    Obstructive sleep apnea on CPAP    Osteoarthritis    Ovarian cyst    Tendonitis of left hip

## 2025-05-07 NOTE — PROGRESS NOTE ADULT - ASSESSMENT
ASSESSMENT/PLAN: R ICA-MCA occlusion; ?ICAD     NEURO:  - Q1 hr neurochecks  - Stroke core measures  - MRI/MRA/NOVA pending  - Heparin gtt  PTT 50-70 for Right ICA occlusion  Pain: Tylenol PRN  Activity: [x] mobilize as tolerated [] Bedrest [x] PT [x] OT [] PMNR    PULM:  - RA/IS  - Keep O2sat>92%    CV:  - SBP goal 120-180  - Fluid boluses prn  - Levo prn  - Lipid wnl.   - Atorvastatin 80mg.   - TTE w/ bubbles pending.     RENAL:  Fluids: Plasmalyte 100cc/hr     GI:  Diet: NPO for now   GI prophylaxis [x] not indicated [] PPI [] other:  Bowel regimen [] colace [x] senna [x] other: miralax     ENDO:   Goal euglycemia (-180)  A1C 5.8  AKBAR     HEME/ONC:  VTE prophylaxis: SCDs, heparin gtt  BLE Dopp no DVTs.    ID: monitor for fevers     MISC:    SOCIAL/FAMILY:  [] awaiting [x] updated at bedside --son and wife [] family meeting    CODE STATUS:  [x] Full Code [] DNR [] DNI [] Palliative/Comfort Care    DISPOSITION:  [x] ICU [] Stroke Unit [] Floor [] EMU [] RCU [] PCU    [x] Patient is at high risk of neurologic deterioration/death due to: ICA, MCA occlusion, acute stroke       Contact: [phone] ASSESSMENT/PLAN: R ICA-MCA occlusion; ?ICAD     NEURO:  - Q2hr neurochecks  - Stroke core measures  - Heparin gtt PTT 50-70 for Right ICA occlusion  - Preop for Angio today.  Pain: Tylenol PRN  Activity: [x] mobilize as tolerated [] Bedrest [x] PT [x] OT [] PMNR    PULM:  - RA/IS  - Keep O2sat>92%    CV:  - SBP goal 120-180  - Fluid boluses prn  - Levo prn  - Lipid wnl.   - Atorvastatin 80mg.   - TTE w/ bubbles unremarkable.    RENAL:  Fluids: Plasmalyte 100cc/hr     GI:  Diet: NPO for angio  GI prophylaxis [x] not indicated [] PPI [] other:  Bowel regimen [] colace [x] senna [x] other: miralax     ENDO:   Goal euglycemia (-180)  A1C 5.8  AKBAR     HEME/ONC:  VTE prophylaxis: SCDs, heparin gtt  BLE Dopp no DVTs.    ID: monitor for fevers     MISC:    SOCIAL/FAMILY:  [] awaiting [x] updated at bedside --son and wife [] family meeting    CODE STATUS:  [x] Full Code [] DNR [] DNI [] Palliative/Comfort Care    DISPOSITION:  [x] ICU [] Stroke Unit [] Floor [] EMU [] RCU [] PCU    [x] Patient is at high risk of neurologic deterioration/death due to: ICA, MCA occlusion, acute stroke       Contact: [phone]

## 2025-05-07 NOTE — PROGRESS NOTE ADULT - SUBJECTIVE AND OBJECTIVE BOX
SUMMARY:  64M, PMHx HTN and HLD not on AC/AP presenting to [facility] as a code stroke for left sided weakness. LKW 7AM 5/5. Patient then felt LUE/LLE weakness. Had an episode of speech disturbance also around 9:30 AM, that resolved after unknown duration of time. Also, over the past few months, patient has had numbness in LUE/LLE coming and going. Denies HA, vision changes.    ICU COURSE:  5/5: admitted to NSCU for hemodynamic monitoring to see if neurological exam is pressure dependent.    ADMISSION SCORES:   GCS: 15    REVIEW OF SYSTEMS: None other than stated in HPI    ALLERGIES: Allergies    No Known Allergies    Intolerances        VITALS/DATA/ORDERS:  ICU Vital Signs Last 24 Hrs  T(C): 37.3 (07 May 2025 07:27), Max: 37.8 (06 May 2025 19:00)  T(F): 99.1 (07 May 2025 07:27), Max: 100 (06 May 2025 19:00)  HR: 77 (07 May 2025 07:27) (53 - 89)  BP: 144/80 (07 May 2025 07:27) (144/80 - 144/80)  BP(mean): --  ABP: 143/66 (07 May 2025 07:00) (113/78 - 159/91)  ABP(mean): 94 (07 May 2025 07:00) (80 - 115)  RR: 18 (07 May 2025 07:27) (14 - 29)  SpO2: 99% (07 May 2025 07:27) (94% - 100%)    O2 Parameters below as of 06 May 2025 19:00  Patient On (Oxygen Delivery Method): room air                              12.2   8.16  )-----------( 303      ( 06 May 2025 22:20 )             37.6       05-06    139  |  105  |  11  ----------------------------<  114[H]  3.9   |  22  |  0.75    Ca    8.3[L]      06 May 2025 22:20  Phos  2.6     05-06  Mg     2.0     05-06    TPro  7.3  /  Alb  4.2  /  TBili  0.3  /  DBili  x   /  AST  33  /  ALT  33  /  AlkPhos  103  05-05              PT/INR - ( 06 May 2025 22:20 )   PT: 12.9 sec;   INR: 1.13 ratio         PTT - ( 07 May 2025 04:45 )  PTT:50.4 sec        I&O's Summary    06 May 2025 07:01  -  07 May 2025 07:00  --------------------------------------------------------  IN: 3370.5 mL / OUT: 3900 mL / NET: -529.5 mL        MEDICATIONS  (STANDING):  atorvastatin 80 milliGRAM(s) Oral at bedtime  heparin  Infusion 1000 Unit(s)/Hr (11.5 mL/Hr) IV Continuous <Continuous>  multiple electrolytes Injection Type 1 1000 milliLiter(s) (100 mL/Hr) IV Continuous <Continuous>  norepinephrine Infusion 0.05 MICROgram(s)/kG/Min (7.23 mL/Hr) IV Continuous <Continuous>  polyethylene glycol 3350 17 Gram(s) Oral two times a day  senna 2 Tablet(s) Oral at bedtime    MEDICATIONS  (PRN):  acetaminophen     Tablet .. 650 milliGRAM(s) Oral every 6 hours PRN Temp greater or equal to 38C (100.4F), Mild Pain (1 - 3)  acetaminophen   IVPB .. 1000 milliGRAM(s) IV Intermittent every 6 hours PRN Mild Pain (1 - 3)      EXAMINATION:  General: No acute distress  HEENT: Anicteric sclerae  Cardiac: [id number]  Lungs: Clear  Abdomen: Soft, non-tender, +BS  Extremities: No c/c/e  Skin/Incision Site: Clean, dry and intact  Neurologic: AOx3, EOMI, no facial, no drift, DIAZ 5/5, Right fingers amputated. SUMMARY:  64M, PMHx HTN and HLD not on AC/AP presenting to [facility] as a code stroke for left sided weakness. LKW 7AM 5/5. Patient then felt LUE/LLE weakness. Had an episode of speech disturbance also around 9:30 AM, that resolved after unknown duration of time. Also, over the past few months, patient has had numbness in LUE/LLE coming and going. Denies HA, vision changes.    ICU COURSE:  5/5: admitted to NSCU for hemodynamic monitoring to see if neurological exam is pressure dependent.    ADMISSION SCORES:   GCS: 15    REVIEW OF SYSTEMS: None other than stated in HPI    ALLERGIES: Allergies    No Known Allergies    Intolerances        VITALS/DATA/ORDERS:  ICU Vital Signs Last 24 Hrs  T(C): 37.3 (07 May 2025 07:27), Max: 37.8 (06 May 2025 19:00)  T(F): 99.1 (07 May 2025 07:27), Max: 100 (06 May 2025 19:00)  HR: 77 (07 May 2025 07:27) (53 - 89)  BP: 144/80 (07 May 2025 07:27) (144/80 - 144/80)  BP(mean): --  ABP: 143/66 (07 May 2025 07:00) (113/78 - 159/91)  ABP(mean): 94 (07 May 2025 07:00) (80 - 115)  RR: 18 (07 May 2025 07:27) (14 - 29)  SpO2: 99% (07 May 2025 07:27) (94% - 100%)    O2 Parameters below as of 06 May 2025 19:00  Patient On (Oxygen Delivery Method): room air      ICU Vital Signs Last 24 Hrs  T(C): 37.2 (07 May 2025 19:00), Max: 37.4 (07 May 2025 07:00)  T(F): 99 (07 May 2025 19:00), Max: 99.4 (07 May 2025 07:00)  HR: 60 (07 May 2025 23:15) (53 - 82)  BP: 138/68 (07 May 2025 08:00) (138/68 - 144/80)  BP(mean): 94 (07 May 2025 08:00) (84 - 94)  ABP: 128/84 (07 May 2025 23:15) (113/78 - 173/75)  ABP(mean): 101 (07 May 2025 23:15) (82 - 138)  RR: 16 (07 May 2025 23:15) (14 - 29)  SpO2: 100% (07 May 2025 23:15) (94% - 100%)    O2 Parameters below as of 07 May 2025 19:00  Patient On (Oxygen Delivery Method): room air          MEDICATIONS  (STANDING):  atorvastatin 80 milliGRAM(s) Oral at bedtime  heparin  Infusion 1000 Unit(s)/Hr (11.5 mL/Hr) IV Continuous <Continuous>  multiple electrolytes Injection Type 1 1000 milliLiter(s) (100 mL/Hr) IV Continuous <Continuous>  norepinephrine Infusion 0.05 MICROgram(s)/kG/Min (7.23 mL/Hr) IV Continuous <Continuous>  polyethylene glycol 3350 17 Gram(s) Oral two times a day  senna 2 Tablet(s) Oral at bedtime    MEDICATIONS  (PRN):  acetaminophen     Tablet .. 650 milliGRAM(s) Oral every 6 hours PRN Temp greater or equal to 38C (100.4F), Mild Pain (1 - 3)  acetaminophen   IVPB .. 1000 milliGRAM(s) IV Intermittent every 6 hours PRN Mild Pain (1 - 3)      EXAMINATION:  General: No acute distress  HEENT: Anicteric sclerae  Cardiac: [id number]  Lungs: Clear  Abdomen: Soft, non-tender, +BS  Extremities: No c/c/e  Skin/Incision Site: Clean, dry and intact  Neurologic: AOx3, EOMI, no facial, no drift, DIAZ 5/5, Right fingers amputated. SUMMARY:  64M, PMHx HTN and HLD not on AC/AP presenting to [facility] as a code stroke for left sided weakness. LKW 7AM 5/5. Patient then felt LUE/LLE weakness. Had an episode of speech disturbance also around 9:30 AM, that resolved after unknown duration of time. Also, over the past few months, patient has had numbness in LUE/LLE coming and going. Denies HA, vision changes.      ON: no events noted.       ICU COURSE:  5/5: admitted to NSCU for hemodynamic monitoring to see if neurological exam is pressure dependent.    ADMISSION SCORES:   GCS: 15    REVIEW OF SYSTEMS: None other than stated in HPI    ALLERGIES: Allergies    No Known Allergies    Intolerances    ICU Vital Signs Last 24 Hrs  T(C): 37.2 (07 May 2025 19:00), Max: 37.4 (07 May 2025 07:00)  T(F): 99 (07 May 2025 19:00), Max: 99.4 (07 May 2025 07:00)  HR: 60 (07 May 2025 23:15) (53 - 82)  BP: 138/68 (07 May 2025 08:00) (138/68 - 144/80)  BP(mean): 94 (07 May 2025 08:00) (84 - 94)  ABP: 128/84 (07 May 2025 23:15) (113/78 - 173/75)  ABP(mean): 101 (07 May 2025 23:15) (82 - 138)  RR: 16 (07 May 2025 23:15) (14 - 29)  SpO2: 100% (07 May 2025 23:15) (94% - 100%)    O2 Parameters below as of 07 May 2025 19:00  Patient On (Oxygen Delivery Method): room air      MEDICATIONS  (STANDING):  atorvastatin 80 milliGRAM(s) Oral at bedtime  heparin  Infusion 1000 Unit(s)/Hr (11.5 mL/Hr) IV Continuous <Continuous>  multiple electrolytes Injection Type 1 1000 milliLiter(s) (100 mL/Hr) IV Continuous <Continuous>  norepinephrine Infusion 0.05 MICROgram(s)/kG/Min (7.23 mL/Hr) IV Continuous <Continuous>  polyethylene glycol 3350 17 Gram(s) Oral two times a day  senna 2 Tablet(s) Oral at bedtime    MEDICATIONS  (PRN):  acetaminophen     Tablet .. 650 milliGRAM(s) Oral every 6 hours PRN Temp greater or equal to 38C (100.4F), Mild Pain (1 - 3)  acetaminophen   IVPB .. 1000 milliGRAM(s) IV Intermittent every 6 hours PRN Mild Pain (1 - 3)      EXAMINATION:  General: No acute distress  HEENT: Anicteric sclerae  Cardiac: [id number]  Lungs: Clear  Abdomen: Soft, non-tender, +BS  Extremities: No c/c/e  Skin/Incision Site: Clean, dry and intact  Neurologic at  : AOx3 to verbal stimulation. speech is clear. follows commands.   CNII-CNXII intact. no facial palsy noted.   Strength:  DIAZ 5/5, Right fingers amputated  NIHSs 0 .      LABS:                          12.2   8.16  )-----------( 303      ( 06 May 2025 22:20 )             37.6     05-06    139  |  105  |  11  ----------------------------<  114[H]  3.9   |  22  |  0.75    Ca    8.3[L]      06 May 2025 22:20  Phos  2.6     05-06  Mg     2.0     05-06        PT/INR - ( 06 May 2025 22:20 )   PT: 12.9 sec;   INR: 1.13 ratio         PTT - ( 07 May 2025 04:45 )  PTT:50.4 sec  Urinalysis Basic - ( 06 May 2025 22:20 )    Color: x / Appearance: x / SG: x / pH: x  Gluc: 114 mg/dL / Ketone: x  / Bili: x / Urobili: x   Blood: x / Protein: x / Nitrite: x   Leuk Esterase: x / RBC: x / WBC x   Sq Epi: x / Non Sq Epi: x / Bacteria: x

## 2025-05-07 NOTE — PROGRESS NOTE ADULT - ASSESSMENT
ASSESSMENT/PLAN: R ICA-MCA occlusion; ?ICAD     NEURO:  - Q2hr neurochecks  - Stroke core measures  - Heparin gtt PTT 50-70 for Right ICA occlusion  - Preop for Angio today.  Pain: Tylenol PRN  Activity: [x] mobilize as tolerated [] Bedrest [x] PT [x] OT [] PMNR    PULM:  - RA/IS  - Keep O2sat>92%    CV:  - SBP goal 120-180  - Fluid boluses prn  - Levo prn  - Lipid wnl.   - Atorvastatin 80mg.   - TTE w/ bubbles unremarkable.    RENAL:  Fluids: Plasmalyte 100cc/hr     GI:  Diet: NPO for angio  GI prophylaxis [x] not indicated [] PPI [] other:  Bowel regimen [] colace [x] senna [x] other: miralax     ENDO:   Goal euglycemia (-180)  A1C 5.8  AKBAR     HEME/ONC:  VTE prophylaxis: SCDs, heparin gtt  BLE Dopp no DVTs.    ID: monitor for fevers     MISC:    SOCIAL/FAMILY:  [] awaiting [x] updated at bedside --son and wife [] family meeting    CODE STATUS:  [x] Full Code [] DNR [] DNI [] Palliative/Comfort Care    DISPOSITION:  [x] ICU [] Stroke Unit [] Floor [] EMU [] RCU [] PCU    [x] Patient is at high risk of neurologic deterioration/death due to: ICA, MCA occlusion, acute stroke       Contact: [phone] ASSESSMENT/PLAN: R ICA-MCA occlusion; ?ICAD     NEURO:  - Q2hr neurochecks  - Stroke core measures  - Heparin gtt PTT 50-70 for Right ICA occlusion  - can consider DAPT   - Preop for Angio ketan  - stroke neuro following   Pain: Tylenol PRN  Activity: [x] mobilize as tolerated [] Bedrest [x] PT [x] OT [] PMNR    PULM:  - RA/IS  - Keep O2sat>92%    CV:  - SBP goal 120-180  - Fluid boluses prn  - Levo prn  - Lipid wnl.   - Atorvastatin 80mg.   - TTE w/ bubbles unremarkable.    RENAL:  Fluids: Plasmalyte 100cc/hr     GI:  Diet: NPO for angio  GI prophylaxis [x] not indicated [] PPI [] other:  Bowel regimen [] colace [x] senna [x] other: miralax     ENDO:   Goal euglycemia (-180)  A1C 5.8  AKBAR     HEME/ONC:  VTE prophylaxis: SCDs, heparin gtt  BLE Dopp no DVTs.    ID: monitor for fevers     MISC:    SOCIAL/FAMILY:  [] awaiting [x] updated at bedside --son and wife [] family meeting    CODE STATUS:  [x] Full Code [] DNR [] DNI [] Palliative/Comfort Care    DISPOSITION:  [x] ICU [] Stroke Unit [] Floor [] EMU [] RCU [] PCU    [x] Patient is at high risk of neurologic deterioration/death due to: ICA, MCA occlusion, acute stroke       Contact: [phone] ASSESSMENT/PLAN: R ICA-MCA occlusion; ?ICAD     NEURO:  - Q2hr neurochecks  - Stroke core measures  - Heparin gtt PTT 50-70 for Right ICA occlusion  - can consider DAPT   - Preop for Angio ketan  - stroke neuro following   Pain: Tylenol PRN  Activity: [x] mobilize as tolerated [] Bedrest [x] PT [x] OT [] PMNR    PULM:  - RA/IS  - Keep O2sat>92%    CV:  - SBP goal 120-180  - Lipid wnl.   - Atorvastatin 80mg.   - TTE w/ bubbles unremarkable.    RENAL:  Fluids: Plasmalyte 100cc/hr   Na goal 140-150     GI:  Diet: NPO for angio  GI prophylaxis [x] not indicated [] PPI [] other:  Bowel regimen [] colace [x] senna [x] other: miralax     ENDO:   Goal euglycemia (-180)  A1C 5.8  AKBAR     HEME/ONC:  VTE prophylaxis: SCDs, heparin gtt  BLE Dopp no DVTs.    ID: monitor for fevers     MISC:    SOCIAL/FAMILY:  [] awaiting [x] updated at bedside --son and wife [] family meeting    CODE STATUS:  [x] Full Code [] DNR [] DNI [] Palliative/Comfort Care    DISPOSITION:  [x] ICU [] Stroke Unit [] Floor [] EMU [] RCU [] PCU    [x] Patient is at high risk of neurologic deterioration/death due to: ICA, MCA occlusion, acute stroke       Contact: [phone]

## 2025-05-07 NOTE — DIETITIAN INITIAL EVALUATION ADULT - PERTINENT LABORATORY DATA
05-06    139  |  105  |  11  ----------------------------<  114[H]  3.9   |  22  |  0.75    Ca    8.3[L]      06 May 2025 22:20  Phos  2.6     05-06  Mg     2.0     05-06    TPro  7.3  /  Alb  4.2  /  TBili  0.3  /  DBili  x   /  AST  33  /  ALT  33  /  AlkPhos  103  05-05  A1C with Estimated Average Glucose Result: 5.8 % (05-05-25 @ 12:33)

## 2025-05-07 NOTE — DIETITIAN INITIAL EVALUATION ADULT - PHYSCIAL ASSESSMENT
Weights:    Dosing weight: 77.1 kg/ 170 pounds     Weight History per Brooke HIE:  78 kg/ 172 pounds (7/18/23)    IBW:160 pounds  %IBW:107.5%

## 2025-05-07 NOTE — PROGRESS NOTE ADULT - SUBJECTIVE AND OBJECTIVE BOX
SUMMARY:  64M, PMHx HTN and HLD not on AC/AP presenting to [facility] as a code stroke for left sided weakness. LKW 7AM 5/5. Patient then felt LUE/LLE weakness. Had an episode of speech disturbance also around 9:30 AM, that resolved after unknown duration of time. Also, over the past few months, patient has had numbness in LUE/LLE coming and going. Denies HA, vision changes.    ICU COURSE:  5/5: admitted to NSCU for hemodynamic monitoring to see if neurological exam is pressure dependent.    ADMISSION SCORES:   GCS: 15    REVIEW OF SYSTEMS: None other than stated in HPI    ALLERGIES: Allergies    No Known Allergies    Intolerances        VITALS/DATA/ORDERS:  ICU Vital Signs Last 24 Hrs  T(C): 37.3 (07 May 2025 07:27), Max: 37.8 (06 May 2025 19:00)  T(F): 99.1 (07 May 2025 07:27), Max: 100 (06 May 2025 19:00)  HR: 77 (07 May 2025 07:27) (53 - 89)  BP: 144/80 (07 May 2025 07:27) (144/80 - 144/80)  BP(mean): --  ABP: 143/66 (07 May 2025 07:00) (113/78 - 159/91)  ABP(mean): 94 (07 May 2025 07:00) (80 - 115)  RR: 18 (07 May 2025 07:27) (14 - 29)  SpO2: 99% (07 May 2025 07:27) (94% - 100%)    O2 Parameters below as of 06 May 2025 19:00  Patient On (Oxygen Delivery Method): room air                              12.2   8.16  )-----------( 303      ( 06 May 2025 22:20 )             37.6       05-06    139  |  105  |  11  ----------------------------<  114[H]  3.9   |  22  |  0.75    Ca    8.3[L]      06 May 2025 22:20  Phos  2.6     05-06  Mg     2.0     05-06    TPro  7.3  /  Alb  4.2  /  TBili  0.3  /  DBili  x   /  AST  33  /  ALT  33  /  AlkPhos  103  05-05              PT/INR - ( 06 May 2025 22:20 )   PT: 12.9 sec;   INR: 1.13 ratio         PTT - ( 07 May 2025 04:45 )  PTT:50.4 sec        I&O's Summary    06 May 2025 07:01  -  07 May 2025 07:00  --------------------------------------------------------  IN: 3370.5 mL / OUT: 3900 mL / NET: -529.5 mL        MEDICATIONS  (STANDING):  atorvastatin 80 milliGRAM(s) Oral at bedtime  heparin  Infusion 1000 Unit(s)/Hr (11.5 mL/Hr) IV Continuous <Continuous>  multiple electrolytes Injection Type 1 1000 milliLiter(s) (100 mL/Hr) IV Continuous <Continuous>  norepinephrine Infusion 0.05 MICROgram(s)/kG/Min (7.23 mL/Hr) IV Continuous <Continuous>  polyethylene glycol 3350 17 Gram(s) Oral two times a day  senna 2 Tablet(s) Oral at bedtime    MEDICATIONS  (PRN):  acetaminophen     Tablet .. 650 milliGRAM(s) Oral every 6 hours PRN Temp greater or equal to 38C (100.4F), Mild Pain (1 - 3)  acetaminophen   IVPB .. 1000 milliGRAM(s) IV Intermittent every 6 hours PRN Mild Pain (1 - 3)      EXAMINATION:  General: No acute distress  HEENT: Anicteric sclerae  Cardiac: [id number]  Lungs: Clear  Abdomen: Soft, non-tender, +BS  Extremities: No c/c/e  Skin/Incision Site: Clean, dry and intact  Neurologic: AOx3, EOMI, no facial, no drift, DIAZ 5/5, Right fingers amputated.

## 2025-05-08 ENCOUNTER — APPOINTMENT (OUTPATIENT)
Dept: NEUROSURGERY | Facility: HOSPITAL | Age: 65
End: 2025-05-08

## 2025-05-08 LAB
ADD ON TEST-SPECIMEN IN LAB: SIGNIFICANT CHANGE UP
ANION GAP SERPL CALC-SCNC: 12 MMOL/L — SIGNIFICANT CHANGE UP (ref 5–17)
APTT BLD: 66.9 SEC — HIGH (ref 26.1–36.8)
APTT BLD: 69.2 SEC — HIGH (ref 26.1–36.8)
BLD GP AB SCN SERPL QL: NEGATIVE — SIGNIFICANT CHANGE UP
BUN SERPL-MCNC: 12 MG/DL — SIGNIFICANT CHANGE UP (ref 7–23)
CALCIUM SERPL-MCNC: 8.8 MG/DL — SIGNIFICANT CHANGE UP (ref 8.4–10.5)
CHLORIDE SERPL-SCNC: 106 MMOL/L — SIGNIFICANT CHANGE UP (ref 96–108)
CO2 SERPL-SCNC: 21 MMOL/L — LOW (ref 22–31)
CREAT SERPL-MCNC: 0.78 MG/DL — SIGNIFICANT CHANGE UP (ref 0.5–1.3)
EGFR: 99 ML/MIN/1.73M2 — SIGNIFICANT CHANGE UP
EGFR: 99 ML/MIN/1.73M2 — SIGNIFICANT CHANGE UP
GLUCOSE SERPL-MCNC: 107 MG/DL — HIGH (ref 70–99)
HCT VFR BLD CALC: 38.8 % — LOW (ref 39–50)
HGB BLD-MCNC: 12.5 G/DL — LOW (ref 13–17)
INR BLD: 1.14 RATIO — SIGNIFICANT CHANGE UP (ref 0.85–1.16)
MAGNESIUM SERPL-MCNC: 2 MG/DL — SIGNIFICANT CHANGE UP (ref 1.6–2.6)
MCHC RBC-ENTMCNC: 28.8 PG — SIGNIFICANT CHANGE UP (ref 27–34)
MCHC RBC-ENTMCNC: 32.2 G/DL — SIGNIFICANT CHANGE UP (ref 32–36)
MCV RBC AUTO: 89.4 FL — SIGNIFICANT CHANGE UP (ref 80–100)
NRBC BLD AUTO-RTO: 0 /100 WBCS — SIGNIFICANT CHANGE UP (ref 0–0)
PHOSPHATE SERPL-MCNC: 2.7 MG/DL — SIGNIFICANT CHANGE UP (ref 2.5–4.5)
PLATELET # BLD AUTO: 310 K/UL — SIGNIFICANT CHANGE UP (ref 150–400)
POTASSIUM SERPL-MCNC: 4.2 MMOL/L — SIGNIFICANT CHANGE UP (ref 3.5–5.3)
POTASSIUM SERPL-SCNC: 4.2 MMOL/L — SIGNIFICANT CHANGE UP (ref 3.5–5.3)
PROTHROM AB SERPL-ACNC: 13.1 SEC — SIGNIFICANT CHANGE UP (ref 9.9–13.4)
RBC # BLD: 4.34 M/UL — SIGNIFICANT CHANGE UP (ref 4.2–5.8)
RBC # FLD: 12.7 % — SIGNIFICANT CHANGE UP (ref 10.3–14.5)
RH IG SCN BLD-IMP: POSITIVE — SIGNIFICANT CHANGE UP
SODIUM SERPL-SCNC: 139 MMOL/L — SIGNIFICANT CHANGE UP (ref 135–145)
TROPONIN T, HIGH SENSITIVITY RESULT: <6 NG/L — SIGNIFICANT CHANGE UP (ref 0–51)
WBC # BLD: 9.77 K/UL — SIGNIFICANT CHANGE UP (ref 3.8–10.5)
WBC # FLD AUTO: 9.77 K/UL — SIGNIFICANT CHANGE UP (ref 3.8–10.5)

## 2025-05-08 PROCEDURE — 76937 US GUIDE VASCULAR ACCESS: CPT | Mod: 26

## 2025-05-08 PROCEDURE — 36226 PLACE CATH VERTEBRAL ART: CPT | Mod: 50

## 2025-05-08 PROCEDURE — 36227 PLACE CATH XTRNL CAROTID: CPT | Mod: 50

## 2025-05-08 PROCEDURE — 93010 ELECTROCARDIOGRAM REPORT: CPT

## 2025-05-08 PROCEDURE — 99291 CRITICAL CARE FIRST HOUR: CPT

## 2025-05-08 PROCEDURE — 36223 PLACE CATH CAROTID/INOM ART: CPT | Mod: 59,RT

## 2025-05-08 PROCEDURE — 36224 PLACE CATH CAROTD ART: CPT | Mod: LT

## 2025-05-08 RX ORDER — SODIUM CHLORIDE 9 G/1000ML
1000 INJECTION, SOLUTION INTRAVENOUS
Refills: 0 | Status: DISCONTINUED | OUTPATIENT
Start: 2025-05-08 | End: 2025-05-08

## 2025-05-08 RX ORDER — BISACODYL 5 MG
5 TABLET, DELAYED RELEASE (ENTERIC COATED) ORAL EVERY 12 HOURS
Refills: 0 | Status: DISCONTINUED | OUTPATIENT
Start: 2025-05-08 | End: 2025-05-13

## 2025-05-08 RX ORDER — MIDODRINE HYDROCHLORIDE 5 MG/1
5 TABLET ORAL EVERY 8 HOURS
Refills: 0 | Status: DISCONTINUED | OUTPATIENT
Start: 2025-05-08 | End: 2025-05-09

## 2025-05-08 RX ORDER — POTASSIUM PHOSPHATE, MONOBASIC POTASSIUM PHOSPHATE, DIBASIC INJECTION, 236; 224 MG/ML; MG/ML
30 SOLUTION, CONCENTRATE INTRAVENOUS ONCE
Refills: 0 | Status: COMPLETED | OUTPATIENT
Start: 2025-05-08 | End: 2025-05-08

## 2025-05-08 RX ADMIN — POLYETHYLENE GLYCOL 3350 17 GRAM(S): 17 POWDER, FOR SOLUTION ORAL at 05:18

## 2025-05-08 RX ADMIN — NOREPINEPHRINE BITARTRATE 7.23 MICROGRAM(S)/KG/MIN: 8 SOLUTION at 19:18

## 2025-05-08 RX ADMIN — ATORVASTATIN CALCIUM 80 MILLIGRAM(S): 80 TABLET, FILM COATED ORAL at 21:15

## 2025-05-08 RX ADMIN — HEPARIN SODIUM 11.5 UNIT(S)/HR: 1000 INJECTION INTRAVENOUS; SUBCUTANEOUS at 19:18

## 2025-05-08 RX ADMIN — Medication 1000 MILLILITER(S): at 08:45

## 2025-05-08 RX ADMIN — POTASSIUM PHOSPHATE, MONOBASIC POTASSIUM PHOSPHATE, DIBASIC INJECTION, 83.33 MILLIMOLE(S): 236; 224 SOLUTION, CONCENTRATE INTRAVENOUS at 20:45

## 2025-05-08 RX ADMIN — Medication 2 TABLET(S): at 21:15

## 2025-05-08 RX ADMIN — POLYETHYLENE GLYCOL 3350 17 GRAM(S): 17 POWDER, FOR SOLUTION ORAL at 17:41

## 2025-05-08 RX ADMIN — SODIUM CHLORIDE 100 MILLILITER(S): 9 INJECTION, SOLUTION INTRAVENOUS at 19:18

## 2025-05-08 NOTE — PROGRESS NOTE ADULT - SUBJECTIVE AND OBJECTIVE BOX
SUMMARY:  64M, PMHx HTN and HLD not on AC/AP presenting to [facility] as a code stroke for left sided weakness. LKW 7AM 5/5. Patient then felt LUE/LLE weakness. Had an episode of speech disturbance also around 9:30 AM, that resolved after unknown duration of time. Also, over the past few months, patient has had numbness in LUE/LLE coming and going. Denies HA, vision changes.    ICU COURSE:  5/5: admitted to NSCU for hemodynamic monitoring to see if neurological exam is pressure dependent.  5/6-5/7: no acute events.    ADMISSION SCORES:   GCS: 15    REVIEW OF SYSTEMS: None other than stated in HPI    ALLERGIES: Allergies    No Known Allergies    Intolerances        VITALS/DATA/ORDERS:  ICU Vital Signs Last 24 Hrs  T(C): 36.5 (08 May 2025 07:00), Max: 37.3 (07 May 2025 15:15)  T(F): 97.7 (08 May 2025 07:00), Max: 99.2 (07 May 2025 15:15)  HR: 56 (08 May 2025 08:15) (53 - 82)  BP: --  BP(mean): --  ABP: 129/64 (08 May 2025 08:15) (119/73 - 173/157)  ABP(mean): 88 (08 May 2025 08:15) (78 - 164)  RR: 14 (08 May 2025 08:15) (13 - 20)  SpO2: 99% (08 May 2025 08:15) (92% - 100%)    O2 Parameters below as of 08 May 2025 08:00  Patient On (Oxygen Delivery Method): room air        LABS:                        12.5   9.77  )-----------( 310      ( 08 May 2025 05:43 )             38.8       05-08    139  |  106  |  12  ----------------------------<  107[H]  4.2   |  21[L]  |  0.78    Ca    8.8      08 May 2025 05:43  Phos  2.7     05-08  Mg     2.0     05-08                PT/INR - ( 08 May 2025 05:10 )   PT: 13.1 sec;   INR: 1.14 ratio         PTT - ( 08 May 2025 05:10 )  PTT:69.2 sec        I&O's Summary    07 May 2025 07:01  -  08 May 2025 07:00  --------------------------------------------------------  IN: 2667.1 mL / OUT: 4850 mL / NET: -2182.9 mL      MEDICATIONS  (STANDING):  atorvastatin 80 milliGRAM(s) Oral at bedtime  heparin  Infusion 1000 Unit(s)/Hr (11.5 mL/Hr) IV Continuous <Continuous>  multiple electrolytes Injection Type 1 1000 milliLiter(s) (100 mL/Hr) IV Continuous <Continuous>  norepinephrine Infusion 0.05 MICROgram(s)/kG/Min (7.23 mL/Hr) IV Continuous <Continuous>  polyethylene glycol 3350 17 Gram(s) Oral two times a day  senna 2 Tablet(s) Oral at bedtime  sodium chloride 0.9% Bolus 1000 milliLiter(s) IV Bolus once    MEDICATIONS  (PRN):  acetaminophen     Tablet .. 650 milliGRAM(s) Oral every 6 hours PRN Temp greater or equal to 38C (100.4F), Mild Pain (1 - 3)  acetaminophen   IVPB .. 1000 milliGRAM(s) IV Intermittent every 6 hours PRN Mild Pain (1 - 3)        EXAMINATION:  General: No acute distress  HEENT: Anicteric sclerae  Cardiac: [id number]  Lungs: Clear  Abdomen: Soft, non-tender, +BS  Extremities: No c/c/e  Skin/Incision Site: Clean, dry and intact  Neurologic: AOx3, EOMI, no facial, no drift, DIAZ 5/5, Right fingers amputated.

## 2025-05-08 NOTE — PROGRESS NOTE ADULT - SUBJECTIVE AND OBJECTIVE BOX
SUMMARY:  64M, PMHx HTN and HLD not on AC/AP presenting to [facility] as a code stroke for left sided weakness. LKW 7AM 5/5. Patient then felt LUE/LLE weakness. Had an episode of speech disturbance also around 9:30 AM, that resolved after unknown duration of time. Also, over the past few months, patient has had numbness in LUE/LLE coming and going. Denies HA, vision changes.    ICU COURSE:  5/5: admitted to NSCU for hemodynamic monitoring to see if neurological exam is pressure dependent.  5/6-5/7: no acute events.    ADMISSION SCORES:   GCS: 15    REVIEW OF SYSTEMS: None other than stated in HPI    ALLERGIES: Allergies    No Known Allergies    Intolerances        VITALS/DATA/ORDERS:  ICU Vital Signs Last 24 Hrs  T(C): 36.5 (08 May 2025 07:00), Max: 37.3 (07 May 2025 15:15)  T(F): 97.7 (08 May 2025 07:00), Max: 99.2 (07 May 2025 15:15)  HR: 56 (08 May 2025 08:15) (53 - 82)  BP: --  BP(mean): --  ABP: 129/64 (08 May 2025 08:15) (119/73 - 173/157)  ABP(mean): 88 (08 May 2025 08:15) (78 - 164)  RR: 14 (08 May 2025 08:15) (13 - 20)  SpO2: 99% (08 May 2025 08:15) (92% - 100%)    O2 Parameters below as of 08 May 2025 08:00  Patient On (Oxygen Delivery Method): room air        LABS:                        12.5   9.77  )-----------( 310      ( 08 May 2025 05:43 )             38.8       05-08    139  |  106  |  12  ----------------------------<  107[H]  4.2   |  21[L]  |  0.78    Ca    8.8      08 May 2025 05:43  Phos  2.7     05-08  Mg     2.0     05-08                PT/INR - ( 08 May 2025 05:10 )   PT: 13.1 sec;   INR: 1.14 ratio         PTT - ( 08 May 2025 05:10 )  PTT:69.2 sec        I&O's Summary    07 May 2025 07:01  -  08 May 2025 07:00  --------------------------------------------------------  IN: 2667.1 mL / OUT: 4850 mL / NET: -2182.9 mL      MEDICATIONS  (STANDING):  atorvastatin 80 milliGRAM(s) Oral at bedtime  heparin  Infusion 1000 Unit(s)/Hr (11.5 mL/Hr) IV Continuous <Continuous>  multiple electrolytes Injection Type 1 1000 milliLiter(s) (100 mL/Hr) IV Continuous <Continuous>  norepinephrine Infusion 0.05 MICROgram(s)/kG/Min (7.23 mL/Hr) IV Continuous <Continuous>  polyethylene glycol 3350 17 Gram(s) Oral two times a day  senna 2 Tablet(s) Oral at bedtime  sodium chloride 0.9% Bolus 1000 milliLiter(s) IV Bolus once    MEDICATIONS  (PRN):  acetaminophen     Tablet .. 650 milliGRAM(s) Oral every 6 hours PRN Temp greater or equal to 38C (100.4F), Mild Pain (1 - 3)  acetaminophen   IVPB .. 1000 milliGRAM(s) IV Intermittent every 6 hours PRN Mild Pain (1 - 3)        EXAMINATION:  General: No acute distress  HEENT: Anicteric sclerae  Cardiac: [id number]  Lungs: Clear  Abdomen: Soft, non-tender, +BS  Extremities: No c/c/e  Skin/Incision Site: Clean, dry and intact  Neurologic: AOx3, EOMI, no facial, no drift, DIAZ 5/5, Right fingers amputated. SUMMARY:  64M, PMHx HTN and HLD not on AC/AP presenting to [facility] as a code stroke for left sided weakness. LKW 7AM 5/5. Patient then felt LUE/LLE weakness. Had an episode of speech disturbance also around 9:30 AM, that resolved after unknown duration of time. Also, over the past few months, patient has had numbness in LUE/LLE coming and going. Denies HA, vision changes.    ICU COURSE:  24hrs: had angiogram- severe Rt ica stenosis  5/5: admitted to NSCU for hemodynamic monitoring to see if neurological exam is pressure dependent.  5/6-5/7: no acute events.    ADMISSION SCORES:   GCS: 15    REVIEW OF SYSTEMS: None other than stated in HPI    ALLERGIES: Allergies    No Known Allergies    Intolerances        VITALS/DATA/ORDERS:  ICU Vital Signs Last 24 Hrs  T(C): 36.5 (08 May 2025 07:00), Max: 37.3 (07 May 2025 15:15)  T(F): 97.7 (08 May 2025 07:00), Max: 99.2 (07 May 2025 15:15)  HR: 56 (08 May 2025 08:15) (53 - 82)  BP: --  BP(mean): --  ABP: 129/64 (08 May 2025 08:15) (119/73 - 173/157)  ABP(mean): 88 (08 May 2025 08:15) (78 - 164)  RR: 14 (08 May 2025 08:15) (13 - 20)  SpO2: 99% (08 May 2025 08:15) (92% - 100%)    O2 Parameters below as of 08 May 2025 08:00  Patient On (Oxygen Delivery Method): room air        LABS:                        12.5   9.77  )-----------( 310      ( 08 May 2025 05:43 )             38.8       05-08    139  |  106  |  12  ----------------------------<  107[H]  4.2   |  21[L]  |  0.78    Ca    8.8      08 May 2025 05:43  Phos  2.7     05-08  Mg     2.0     05-08                PT/INR - ( 08 May 2025 05:10 )   PT: 13.1 sec;   INR: 1.14 ratio         PTT - ( 08 May 2025 05:10 )  PTT:69.2 sec        I&O's Summary    07 May 2025 07:01  -  08 May 2025 07:00  --------------------------------------------------------  IN: 2667.1 mL / OUT: 4850 mL / NET: -2182.9 mL      MEDICATIONS  (STANDING):  atorvastatin 80 milliGRAM(s) Oral at bedtime  heparin  Infusion 1000 Unit(s)/Hr (11.5 mL/Hr) IV Continuous <Continuous>  multiple electrolytes Injection Type 1 1000 milliLiter(s) (100 mL/Hr) IV Continuous <Continuous>  norepinephrine Infusion 0.05 MICROgram(s)/kG/Min (7.23 mL/Hr) IV Continuous <Continuous>  polyethylene glycol 3350 17 Gram(s) Oral two times a day  senna 2 Tablet(s) Oral at bedtime  sodium chloride 0.9% Bolus 1000 milliLiter(s) IV Bolus once    MEDICATIONS  (PRN):  acetaminophen     Tablet .. 650 milliGRAM(s) Oral every 6 hours PRN Temp greater or equal to 38C (100.4F), Mild Pain (1 - 3)  acetaminophen   IVPB .. 1000 milliGRAM(s) IV Intermittent every 6 hours PRN Mild Pain (1 - 3)        EXAMINATION:  General: No acute distress  HEENT: Anicteric sclerae  Cardiac: [id number]  Lungs: Clear  Abdomen: Soft, non-tender, +BS  Extremities: No c/c/e  Skin/Incision Site: Clean, dry and intact  Neurologic: AOx3, EOMI, no facial, no drift, DIAZ 5/5, Right fingers amputated.

## 2025-05-08 NOTE — PROGRESS NOTE ADULT - ASSESSMENT
ASSESSMENT/PLAN: R ICA-MCA occlusion; ?ICAD     NEURO:  - Q2hr neurochecks  - Stroke core measures  - Heparin gtt PTT 50-70 for Right ICA occlusion  - Preop for Angio today.  Pain: Tylenol PRN  Activity: [x] mobilize as tolerated [] Bedrest [x] PT [x] OT [] PMNR    PULM:  - RA/IS  - Keep O2sat>92%    CV:  - SBP goal 120-180  - Fluid boluses prn  - Levo prn  - Lipid wnl.   - Atorvastatin 80mg.   - TTE w/ bubbles unremarkable.    RENAL:  Fluids: Plasmalyte 100cc/hr     GI:  Diet: NPO for angio  GI prophylaxis [x] not indicated [] PPI [] other:  Bowel regimen [] colace [x] senna [x] other: miralax     ENDO:   Goal euglycemia (-180)  A1C 5.8  AKBAR     HEME/ONC:  VTE prophylaxis: SCDs, heparin gtt  BLE Dopp no DVTs.    ID: monitor for fevers     MISC:    SOCIAL/FAMILY:  [] awaiting [x] updated at bedside --son and wife [] family meeting    CODE STATUS:  [x] Full Code [] DNR [] DNI [] Palliative/Comfort Care    DISPOSITION:  [x] ICU [] Stroke Unit [] Floor [] EMU [] RCU [] PCU    [x] Patient is at high risk of neurologic deterioration/death due to: ICA, MCA occlusion, acute stroke       Contact: [phone] ASSESSMENT/PLAN: R ICA-MCA occlusion; ?ICAD     NEURO: POD0 of DSA RT ICA/MCA   - Q2hr neurochecks  - Stroke core measures  - Heparin gtt PTT 50-70 for Right ICA occlusion  Pain: Tylenol PRN  Activity: [x] mobilize as tolerated [] Bedrest [x] PT [x] OT [] PMNR    PULM:  - RA/IS  - Keep O2sat>92%    CV:  - SBP goal 120-180  - Fluid boluses prn  - Levo wean down   - started on midodrine 5mg q8hrs   - Lipid wnl.   - Atorvastatin 80mg.   - TTE w/ bubbles unremarkable.    RENAL:  Fluids: Plasmalyte 100cc/hr     GI:  Diet: regular diet   GI prophylaxis [x] not indicated [] PPI [] other:  Bowel regimen [] colace [x] senna [x] other: miralax     ENDO:   Goal euglycemia (-180)  A1C 5.8  AKBAR     HEME/ONC:  VTE prophylaxis: SCDs, heparin gtt  BLE Dopp no DVTs.    ID: monitor for fevers     MISC:    SOCIAL/FAMILY:  [] awaiting [x] updated at bedside --son and wife [] family meeting    CODE STATUS:  [x] Full Code [] DNR [] DNI [] Palliative/Comfort Care    DISPOSITION:  [x] ICU [] Stroke Unit [] Floor [] EMU [] RCU [] PCU    [x] Patient is at high risk of neurologic deterioration/death due to: ICA, MCA occlusion, acute stroke       Contact: [phone]

## 2025-05-08 NOTE — PROGRESS NOTE ADULT - ASSESSMENT
ASSESSMENT/PLAN: R ICA-MCA occlusion; ?ICAD     NEURO:  - Q2hr neurochecks  - Stroke core measures  - Heparin gtt PTT 50-70 for Right ICA occlusion  - Preop for Angio today.  Pain: Tylenol PRN  Activity: [x] mobilize as tolerated [] Bedrest [x] PT [x] OT [] PMNR    PULM:  - RA/IS  - Keep O2sat>92%    CV:  - SBP goal 120-180  - Fluid boluses prn  - Levo prn  - Lipid wnl.   - Atorvastatin 80mg.   - TTE w/ bubbles unremarkable.    RENAL:  Fluids: Plasmalyte 100cc/hr     GI:  Diet: NPO for angio  GI prophylaxis [x] not indicated [] PPI [] other:  Bowel regimen [] colace [x] senna [x] other: miralax     ENDO:   Goal euglycemia (-180)  A1C 5.8  AKBAR     HEME/ONC:  VTE prophylaxis: SCDs, heparin gtt  BLE Dopp no DVTs.    ID: monitor for fevers     MISC:    SOCIAL/FAMILY:  [] awaiting [x] updated at bedside --son and wife [] family meeting    CODE STATUS:  [x] Full Code [] DNR [] DNI [] Palliative/Comfort Care    DISPOSITION:  [x] ICU [] Stroke Unit [] Floor [] EMU [] RCU [] PCU    [x] Patient is at high risk of neurologic deterioration/death due to: ICA, MCA occlusion, acute stroke       Contact: [phone]

## 2025-05-08 NOTE — PRE PROCEDURE NOTE - PRE PROCEDURE EVALUATION
Interventional Neuro Radiology  Pre-Procedure Note    HPI: 64 year old male with a PMH of HTN and HLD not on AC/AP presenting to [facility] as a code stroke for left sided weakness that resolved after unknown duration of time. Plan today for cerebral angiogram and embolization.      Neuro Exam: AOx3, EOMI, no facial, no drift, DIAZ 5/5    PAST MEDICAL & SURGICAL HISTORY:  HTN  HLD    Social History:   Denies tobacco use    FAMILY HISTORY:  No pertinent family history    Allergies: No Known Allergies      Current Medications: acetaminophen     Tablet .. 650 milliGRAM(s) Oral every 6 hours PRN  acetaminophen   IVPB .. 1000 milliGRAM(s) IV Intermittent every 6 hours PRN  atorvastatin 80 milliGRAM(s) Oral at bedtime  heparin  Infusion 1000 Unit(s)/Hr IV Continuous <Continuous>  multiple electrolytes Injection Type 1 1000 milliLiter(s) IV Continuous <Continuous>  norepinephrine Infusion 0.05 MICROgram(s)/kG/Min IV Continuous <Continuous>  polyethylene glycol 3350 17 Gram(s) Oral two times a day  senna 2 Tablet(s) Oral at bedtime      Labs:                         12.5   9.77  )-----------( 310      ( 08 May 2025 05:43 )             38.8       05-08    139  |  106  |  12  ----------------------------<  107[H]  4.2   |  21[L]  |  0.78    Ca    8.8      08 May 2025 05:43  Phos  2.7     05-08  Mg     2.0     05-08    TPro  7.3  /  Alb  4.2  /  TBili  0.3  /  DBili  x   /  AST  33  /  ALT  33  /  AlkPhos  103  05-05    Blood Bank: 05-08-25  O  --  Positive      Assessment/Plan: This is a 64 year old Male presents with Chronic Right MCA occlusion. Patient presents to neuro-IR for selective cerebral angiography. Procedure/ risks/ benefits/ goals/ alternatives were explained. Risks include but are not limited to stroke/ vessel injury/ hemorrhage/ groin hematoma. All questions answered. Informed content obtained from patient family. Consent placed in chart.     H&P reviewed by me which was completed on 5/6/25    
Pt pre-op for angiogram.    Exam: AOx3, EOMI, no drift, DIAZ 5/5 (Rt fingers amputated)

## 2025-05-09 LAB
ANION GAP SERPL CALC-SCNC: 10 MMOL/L — SIGNIFICANT CHANGE UP (ref 5–17)
ANION GAP SERPL CALC-SCNC: 13 MMOL/L — SIGNIFICANT CHANGE UP (ref 5–17)
APTT BLD: 49.5 SEC — HIGH (ref 26.1–36.8)
APTT BLD: 64.1 SEC — HIGH (ref 26.1–36.8)
APTT BLD: 70.3 SEC — HIGH (ref 26.1–36.8)
BUN SERPL-MCNC: 13 MG/DL — SIGNIFICANT CHANGE UP (ref 7–23)
BUN SERPL-MCNC: 8 MG/DL — SIGNIFICANT CHANGE UP (ref 7–23)
CALCIUM SERPL-MCNC: 9 MG/DL — SIGNIFICANT CHANGE UP (ref 8.4–10.5)
CALCIUM SERPL-MCNC: 9 MG/DL — SIGNIFICANT CHANGE UP (ref 8.4–10.5)
CHLORIDE SERPL-SCNC: 103 MMOL/L — SIGNIFICANT CHANGE UP (ref 96–108)
CHLORIDE SERPL-SCNC: 107 MMOL/L — SIGNIFICANT CHANGE UP (ref 96–108)
CO2 SERPL-SCNC: 21 MMOL/L — LOW (ref 22–31)
CO2 SERPL-SCNC: 22 MMOL/L — SIGNIFICANT CHANGE UP (ref 22–31)
CREAT SERPL-MCNC: 0.72 MG/DL — SIGNIFICANT CHANGE UP (ref 0.5–1.3)
CREAT SERPL-MCNC: 0.8 MG/DL — SIGNIFICANT CHANGE UP (ref 0.5–1.3)
EGFR: 101 ML/MIN/1.73M2 — SIGNIFICANT CHANGE UP
EGFR: 101 ML/MIN/1.73M2 — SIGNIFICANT CHANGE UP
EGFR: 98 ML/MIN/1.73M2 — SIGNIFICANT CHANGE UP
EGFR: 98 ML/MIN/1.73M2 — SIGNIFICANT CHANGE UP
GLUCOSE SERPL-MCNC: 113 MG/DL — HIGH (ref 70–99)
GLUCOSE SERPL-MCNC: 165 MG/DL — HIGH (ref 70–99)
HCT VFR BLD CALC: 37.6 % — LOW (ref 39–50)
HGB BLD-MCNC: 12.4 G/DL — LOW (ref 13–17)
INR BLD: 1.11 RATIO — SIGNIFICANT CHANGE UP (ref 0.85–1.16)
MAGNESIUM SERPL-MCNC: 1.8 MG/DL — SIGNIFICANT CHANGE UP (ref 1.6–2.6)
MAGNESIUM SERPL-MCNC: 1.9 MG/DL — SIGNIFICANT CHANGE UP (ref 1.6–2.6)
MCHC RBC-ENTMCNC: 29.5 PG — SIGNIFICANT CHANGE UP (ref 27–34)
MCHC RBC-ENTMCNC: 33 G/DL — SIGNIFICANT CHANGE UP (ref 32–36)
MCV RBC AUTO: 89.5 FL — SIGNIFICANT CHANGE UP (ref 80–100)
NRBC BLD AUTO-RTO: 0 /100 WBCS — SIGNIFICANT CHANGE UP (ref 0–0)
PHOSPHATE SERPL-MCNC: 2.8 MG/DL — SIGNIFICANT CHANGE UP (ref 2.5–4.5)
PHOSPHATE SERPL-MCNC: 3.3 MG/DL — SIGNIFICANT CHANGE UP (ref 2.5–4.5)
PLATELET # BLD AUTO: 279 K/UL — SIGNIFICANT CHANGE UP (ref 150–400)
POTASSIUM SERPL-MCNC: 3.7 MMOL/L — SIGNIFICANT CHANGE UP (ref 3.5–5.3)
POTASSIUM SERPL-MCNC: 4.2 MMOL/L — SIGNIFICANT CHANGE UP (ref 3.5–5.3)
POTASSIUM SERPL-SCNC: 3.7 MMOL/L — SIGNIFICANT CHANGE UP (ref 3.5–5.3)
POTASSIUM SERPL-SCNC: 4.2 MMOL/L — SIGNIFICANT CHANGE UP (ref 3.5–5.3)
PROTHROM AB SERPL-ACNC: 12.8 SEC — SIGNIFICANT CHANGE UP (ref 9.9–13.4)
RBC # BLD: 4.2 M/UL — SIGNIFICANT CHANGE UP (ref 4.2–5.8)
RBC # FLD: 13 % — SIGNIFICANT CHANGE UP (ref 10.3–14.5)
SODIUM SERPL-SCNC: 138 MMOL/L — SIGNIFICANT CHANGE UP (ref 135–145)
SODIUM SERPL-SCNC: 138 MMOL/L — SIGNIFICANT CHANGE UP (ref 135–145)
WBC # BLD: 9.91 K/UL — SIGNIFICANT CHANGE UP (ref 3.8–10.5)
WBC # FLD AUTO: 9.91 K/UL — SIGNIFICANT CHANGE UP (ref 3.8–10.5)

## 2025-05-09 PROCEDURE — 99291 CRITICAL CARE FIRST HOUR: CPT

## 2025-05-09 RX ORDER — MAGNESIUM SULFATE 500 MG/ML
2 SYRINGE (ML) INJECTION ONCE
Refills: 0 | Status: COMPLETED | OUTPATIENT
Start: 2025-05-09 | End: 2025-05-09

## 2025-05-09 RX ORDER — MAGNESIUM SULFATE 500 MG/ML
1 SYRINGE (ML) INJECTION ONCE
Refills: 0 | Status: COMPLETED | OUTPATIENT
Start: 2025-05-09 | End: 2025-05-09

## 2025-05-09 RX ORDER — SOD PHOS DI, MONO/K PHOS MONO 250 MG
2 TABLET ORAL ONCE
Refills: 0 | Status: COMPLETED | OUTPATIENT
Start: 2025-05-09 | End: 2025-05-09

## 2025-05-09 RX ORDER — MIDODRINE HYDROCHLORIDE 5 MG/1
10 TABLET ORAL EVERY 8 HOURS
Refills: 0 | Status: DISCONTINUED | OUTPATIENT
Start: 2025-05-09 | End: 2025-05-21

## 2025-05-09 RX ORDER — NOREPINEPHRINE BITARTRATE 8 MG
0.05 SOLUTION INTRAVENOUS
Qty: 8 | Refills: 0 | Status: DISCONTINUED | OUTPATIENT
Start: 2025-05-09 | End: 2025-05-10

## 2025-05-09 RX ADMIN — Medication 5 MILLIGRAM(S): at 13:39

## 2025-05-09 RX ADMIN — POLYETHYLENE GLYCOL 3350 17 GRAM(S): 17 POWDER, FOR SOLUTION ORAL at 13:39

## 2025-05-09 RX ADMIN — POLYETHYLENE GLYCOL 3350 17 GRAM(S): 17 POWDER, FOR SOLUTION ORAL at 06:07

## 2025-05-09 RX ADMIN — NOREPINEPHRINE BITARTRATE 7.23 MICROGRAM(S)/KG/MIN: 8 SOLUTION at 22:29

## 2025-05-09 RX ADMIN — Medication 25 GRAM(S): at 10:36

## 2025-05-09 RX ADMIN — ATORVASTATIN CALCIUM 80 MILLIGRAM(S): 80 TABLET, FILM COATED ORAL at 21:57

## 2025-05-09 RX ADMIN — MIDODRINE HYDROCHLORIDE 10 MILLIGRAM(S): 5 TABLET ORAL at 13:39

## 2025-05-09 RX ADMIN — Medication 40 MILLIEQUIVALENT(S): at 10:38

## 2025-05-09 RX ADMIN — MIDODRINE HYDROCHLORIDE 5 MILLIGRAM(S): 5 TABLET ORAL at 06:26

## 2025-05-09 RX ADMIN — MIDODRINE HYDROCHLORIDE 10 MILLIGRAM(S): 5 TABLET ORAL at 21:57

## 2025-05-09 RX ADMIN — Medication 2 TABLET(S): at 10:35

## 2025-05-09 RX ADMIN — Medication 1000 MILLILITER(S): at 12:49

## 2025-05-09 RX ADMIN — HEPARIN SODIUM 12 UNIT(S)/HR: 1000 INJECTION INTRAVENOUS; SUBCUTANEOUS at 19:13

## 2025-05-09 RX ADMIN — Medication 2 TABLET(S): at 21:57

## 2025-05-09 RX ADMIN — MIDODRINE HYDROCHLORIDE 5 MILLIGRAM(S): 5 TABLET ORAL at 00:36

## 2025-05-09 NOTE — PROGRESS NOTE ADULT - ASSESSMENT
ASSESSMENT/PLAN: R ICA-MCA occlusion; ?ICAD     NEURO:  - S/p angio 5/8, on intervention. Case to be discussed at Vascular Conference, possible stent week of 5/12.  - Q2H neurochecks  - Stroke core measures  - Heparin gtt PTT 50-70 for Right ICA occlusion  Pain: Tylenol PRN  Activity: [x] mobilize as tolerated [] Bedrest [x] PT [x] OT [] PMNR    PULM:  - RA/IS  - Keep O2sat>92%    CV:  - SBP goal 110-160  - Fluid boluses prn  - Midodrine 10TID.  - Lipid wnl.   - Atorvastatin 80mg.   - TTE w/ bubbles unremarkable.    RENAL:  Fluids: IVL  Monitor I&Os, electrolytes.    GI:  Diet: CCD  GI prophylaxis [x] not indicated [] PPI [] other:  Bowel regimen [] colace [x] senna [x] other: miralax   LBM: PTA    ENDO:   Goal euglycemia (-180)  A1C 5.8  AKBAR     HEME/ONC:  VTE prophylaxis: SCDs, heparin gtt  BLE Dopp no DVTs.    ID: monitor for fevers     MISC:    SOCIAL/FAMILY:  [X] awaiting [] updated at bedside --son and wife [] family meeting    CODE STATUS:  [x] Full Code [] DNR [] DNI [] Palliative/Comfort Care    DISPOSITION:  [x] ICU [] Stroke Unit [] Floor [] EMU [] RCU [] PCU    [x] Patient is at high risk of neurologic deterioration/death due to: ICA, MCA occlusion, acute stroke

## 2025-05-09 NOTE — PROGRESS NOTE ADULT - SUBJECTIVE AND OBJECTIVE BOX
Patient seen and examined at bedside.    --Anticoagulation--  heparin  Infusion 1000 Unit(s)/Hr IV Continuous <Continuous>    T(C): 36.9 (05-08-25 @ 19:00), Max: 36.9 (05-08-25 @ 15:45)  HR: 59 (05-08-25 @ 19:30) (50 - 80)  BP: 124/64 (05-08-25 @ 15:30) (124/64 - 130/80)  RR: 20 (05-08-25 @ 19:30) (13 - 22)  SpO2: 100% (05-08-25 @ 19:30) (92% - 100%)  Wt(kg): --    Exam: AOx3, EOMI, no drift, DIAZ 5/5 (Rt fingers amputated)

## 2025-05-09 NOTE — PROGRESS NOTE ADULT - ASSESSMENT
ASSESSMENT/PLAN: R ICA-MCA occlusion; ?ICAD     NEURO:  - Q2hr neurochecks  - Stroke core measures  - Heparin gtt PTT 50-70 for Right ICA occlusion  - Preop for Angio today.  Pain: Tylenol PRN  Activity: [x] mobilize as tolerated [] Bedrest [x] PT [x] OT [] PMNR    PULM:  - RA/IS  - Keep O2sat>92%    CV:  - SBP goal 120-180  - Fluid boluses prn  - Levo prn  - Lipid wnl.   - Atorvastatin 80mg.   - TTE w/ bubbles unremarkable.    RENAL:  Fluids: Plasmalyte 100cc/hr     GI:  Diet: NPO for angio  GI prophylaxis [x] not indicated [] PPI [] other:  Bowel regimen [] colace [x] senna [x] other: miralax     ENDO:   Goal euglycemia (-180)  A1C 5.8  AKBAR     HEME/ONC:  VTE prophylaxis: SCDs, heparin gtt  BLE Dopp no DVTs.    ID: monitor for fevers     MISC:    SOCIAL/FAMILY:  [] awaiting [x] updated at bedside --son and wife [] family meeting    CODE STATUS:  [x] Full Code [] DNR [] DNI [] Palliative/Comfort Care    DISPOSITION:  [x] ICU [] Stroke Unit [] Floor [] EMU [] RCU [] PCU    [x] Patient is at high risk of neurologic deterioration/death due to: ICA, MCA occlusion, acute stroke       Contact: [phone] ASSESSMENT/PLAN: R ICA-MCA occlusion; ?ICAD     NEURO:  - S/p angio 5/8, on intervention. Case to be discussed at Vascular Conference, possible stent week of 5/12.  - Q2H neurochecks  - Stroke core measures  - Heparin gtt PTT 50-70 for Right ICA occlusion  Pain: Tylenol PRN  Activity: [x] mobilize as tolerated [] Bedrest [x] PT [x] OT [] PMNR    PULM:  - RA/IS  - Keep O2sat>92%    CV:  - SBP goal 110-160  - Fluid boluses prn  - Midodrine 10TID.  - Lipid wnl.   - Atorvastatin 80mg.   - TTE w/ bubbles unremarkable.    RENAL:  Fluids: IVL  Monitor I&Os, electrolytes.    GI:  Diet: CCD  GI prophylaxis [x] not indicated [] PPI [] other:  Bowel regimen [] colace [x] senna [x] other: miralax   LBM: PTA    ENDO:   Goal euglycemia (-180)  A1C 5.8  AKBAR     HEME/ONC:  VTE prophylaxis: SCDs, heparin gtt  BLE Dopp no DVTs.    ID: monitor for fevers     MISC:    SOCIAL/FAMILY:  [X] awaiting [] updated at bedside --son and wife [] family meeting    CODE STATUS:  [x] Full Code [] DNR [] DNI [] Palliative/Comfort Care    DISPOSITION:  [x] ICU [] Stroke Unit [] Floor [] EMU [] RCU [] PCU    [x] Patient is at high risk of neurologic deterioration/death due to: ICA, MCA occlusion, acute stroke

## 2025-05-09 NOTE — PROGRESS NOTE ADULT - SUBJECTIVE AND OBJECTIVE BOX
SUMMARY:  64M, PMHx HTN and HLD not on AC/AP presenting to [facility] as a code stroke for left sided weakness. LKW 7AM 5/5. Patient then felt LUE/LLE weakness. Had an episode of speech disturbance also around 9:30 AM, that resolved after unknown duration of time. Also, over the past few months, patient has had numbness in LUE/LLE coming and going. Denies HA, vision changes.    ICU COURSE:  5/5: admitted to NSCU for hemodynamic monitoring to see if neurological exam is pressure dependent.  5/6-5/7: no acute events.  5/8: s/p diagnostic angio confirming significant Right ICA stenosis but improving MCA clot burden, no stenting performed.    ADMISSION SCORES:   GCS: 15    REVIEW OF SYSTEMS: None other than stated in HPI    ALLERGIES: Allergies    No Known Allergies    Intolerances        VITALS/DATA/ORDERS:  ICU Vital Signs Last 24 Hrs  T(C): 36.9 (08 May 2025 19:00), Max: 36.9 (08 May 2025 15:45)  T(F): 98.5 (08 May 2025 19:00), Max: 98.5 (08 May 2025 15:45)  HR: 60 (09 May 2025 05:45) (49 - 79)  BP: 124/64 (08 May 2025 15:30) (124/64 - 130/80)  BP(mean): 86 (08 May 2025 15:30) (86 - 94)  ABP: 134/63 (09 May 2025 05:45) (99/53 - 170/122)  ABP(mean): 89 (09 May 2025 05:45) (71 - 148)  RR: 18 (09 May 2025 05:45) (12 - 22)  SpO2: 99% (09 May 2025 05:45) (97% - 100%)    O2 Parameters below as of 08 May 2025 19:00  Patient On (Oxygen Delivery Method): room air            LABS:                        12.5   9.77  )-----------( 310      ( 08 May 2025 05:43 )             38.8       05-08    139  |  106  |  12  ----------------------------<  107[H]  4.2   |  21[L]  |  0.78    Ca    8.8      08 May 2025 05:43  Phos  2.7     05-08  Mg     2.0     05-08                PT/INR - ( 08 May 2025 05:10 )   PT: 13.1 sec;   INR: 1.14 ratio         PTT - ( 08 May 2025 11:34 )  PTT:66.9 sec          I&O's Summary    07 May 2025 07:01  -  08 May 2025 07:00  --------------------------------------------------------  IN: 2781.5 mL / OUT: 4850 mL / NET: -2068.5 mL    08 May 2025 07:01  -  09 May 2025 06:42  --------------------------------------------------------  IN: 3438.4 mL / OUT: 5050 mL / NET: -1611.6 mL          MEDICATIONS  (STANDING):  atorvastatin 80 milliGRAM(s) Oral at bedtime  heparin  Infusion 1000 Unit(s)/Hr (11.5 mL/Hr) IV Continuous <Continuous>  midodrine 5 milliGRAM(s) Oral every 8 hours  multiple electrolytes Injection Type 1 1000 milliLiter(s) (100 mL/Hr) IV Continuous <Continuous>  norepinephrine Infusion 0.05 MICROgram(s)/kG/Min (7.23 mL/Hr) IV Continuous <Continuous>  polyethylene glycol 3350 17 Gram(s) Oral two times a day  senna 2 Tablet(s) Oral at bedtime    MEDICATIONS  (PRN):  acetaminophen     Tablet .. 650 milliGRAM(s) Oral every 6 hours PRN Temp greater or equal to 38C (100.4F), Mild Pain (1 - 3)  acetaminophen   IVPB .. 1000 milliGRAM(s) IV Intermittent every 6 hours PRN Mild Pain (1 - 3)  bisacodyl 5 milliGRAM(s) Oral every 12 hours PRN Constipation          EXAMINATION:  General: No acute distress  HEENT: Anicteric sclerae  Cardiac: [id number]  Lungs: Clear  Abdomen: Soft, non-tender, +BS  Extremities: No c/c/e  Skin/Incision Site: Clean, dry and intact  Neurologic: AOx3, EOMI, no facial, no drift, DIAZ 5/5, Right fingers amputated.

## 2025-05-09 NOTE — PROGRESS NOTE ADULT - SUBJECTIVE AND OBJECTIVE BOX
SUMMARY:  64M, PMHx HTN and HLD not on AC/AP presenting to [facility] as a code stroke for left sided weakness. LKW 7AM 5/5. Patient then felt LUE/LLE weakness. Had an episode of speech disturbance also around 9:30 AM, that resolved after unknown duration of time. Also, over the past few months, patient has had numbness in LUE/LLE coming and going. Denies HA, vision changes.    ICU COURSE:  5/5: admitted to NSCU for hemodynamic monitoring to see if neurological exam is pressure dependent.  5/6-5/7: no acute events.  5/8: s/p diagnostic angio confirming significant Right ICA stenosis but improving MCA clot burden, no stenting performed.    ADMISSION SCORES:   GCS: 15    REVIEW OF SYSTEMS: None other than stated in HPI    ALLERGIES: Allergies    No Known Allergies    Intolerances        VITALS/DATA/ORDERS:  ICU Vital Signs Last 24 Hrs  T(C): 36.9 (08 May 2025 19:00), Max: 36.9 (08 May 2025 15:45)  T(F): 98.5 (08 May 2025 19:00), Max: 98.5 (08 May 2025 15:45)  HR: 60 (09 May 2025 05:45) (49 - 79)  BP: 124/64 (08 May 2025 15:30) (124/64 - 130/80)  BP(mean): 86 (08 May 2025 15:30) (86 - 94)  ABP: 134/63 (09 May 2025 05:45) (99/53 - 170/122)  ABP(mean): 89 (09 May 2025 05:45) (71 - 148)  RR: 18 (09 May 2025 05:45) (12 - 22)  SpO2: 99% (09 May 2025 05:45) (97% - 100%)    O2 Parameters below as of 08 May 2025 19:00  Patient On (Oxygen Delivery Method): room air            LABS:                        12.5   9.77  )-----------( 310      ( 08 May 2025 05:43 )             38.8       05-08    139  |  106  |  12  ----------------------------<  107[H]  4.2   |  21[L]  |  0.78    Ca    8.8      08 May 2025 05:43  Phos  2.7     05-08  Mg     2.0     05-08                PT/INR - ( 08 May 2025 05:10 )   PT: 13.1 sec;   INR: 1.14 ratio         PTT - ( 08 May 2025 11:34 )  PTT:66.9 sec          I&O's Summary    07 May 2025 07:01  -  08 May 2025 07:00  --------------------------------------------------------  IN: 2781.5 mL / OUT: 4850 mL / NET: -2068.5 mL    08 May 2025 07:01  -  09 May 2025 06:42  --------------------------------------------------------  IN: 3438.4 mL / OUT: 5050 mL / NET: -1611.6 mL          MEDICATIONS  (STANDING):  atorvastatin 80 milliGRAM(s) Oral at bedtime  heparin  Infusion 1000 Unit(s)/Hr (11.5 mL/Hr) IV Continuous <Continuous>  midodrine 5 milliGRAM(s) Oral every 8 hours  multiple electrolytes Injection Type 1 1000 milliLiter(s) (100 mL/Hr) IV Continuous <Continuous>  norepinephrine Infusion 0.05 MICROgram(s)/kG/Min (7.23 mL/Hr) IV Continuous <Continuous>  polyethylene glycol 3350 17 Gram(s) Oral two times a day  senna 2 Tablet(s) Oral at bedtime    MEDICATIONS  (PRN):  acetaminophen     Tablet .. 650 milliGRAM(s) Oral every 6 hours PRN Temp greater or equal to 38C (100.4F), Mild Pain (1 - 3)  acetaminophen   IVPB .. 1000 milliGRAM(s) IV Intermittent every 6 hours PRN Mild Pain (1 - 3)  bisacodyl 5 milliGRAM(s) Oral every 12 hours PRN Constipation          EXAMINATION:  General: No acute distress  HEENT: Anicteric sclerae  Cardiac: [id number]  Lungs: Clear  Abdomen: Soft, non-tender, +BS  Extremities: No c/c/e  Skin/Incision Site: Clean, dry and intact  Neurologic: AOx3, EOMI, no facial, no drift, DIAZ 5/5, Right fingers amputated.

## 2025-05-10 LAB
APTT BLD: 44.4 SEC — HIGH (ref 26.1–36.8)
APTT BLD: 50.9 SEC — HIGH (ref 26.1–36.8)
APTT BLD: 71.5 SEC — HIGH (ref 26.1–36.8)

## 2025-05-10 PROCEDURE — 99291 CRITICAL CARE FIRST HOUR: CPT

## 2025-05-10 RX ORDER — HEPARIN SODIUM 1000 [USP'U]/ML
1150 INJECTION INTRAVENOUS; SUBCUTANEOUS
Qty: 25000 | Refills: 0 | Status: DISCONTINUED | OUTPATIENT
Start: 2025-05-10 | End: 2025-05-17

## 2025-05-10 RX ORDER — MAGNESIUM CITRATE
296 SOLUTION, ORAL ORAL ONCE
Refills: 0 | Status: COMPLETED | OUTPATIENT
Start: 2025-05-10 | End: 2025-05-10

## 2025-05-10 RX ORDER — HEPARIN SODIUM 1000 [USP'U]/ML
1100 INJECTION INTRAVENOUS; SUBCUTANEOUS
Qty: 25000 | Refills: 0 | Status: DISCONTINUED | OUTPATIENT
Start: 2025-05-10 | End: 2025-05-10

## 2025-05-10 RX ADMIN — Medication 5 MILLIGRAM(S): at 11:58

## 2025-05-10 RX ADMIN — MIDODRINE HYDROCHLORIDE 10 MILLIGRAM(S): 5 TABLET ORAL at 21:01

## 2025-05-10 RX ADMIN — Medication 100 GRAM(S): at 00:10

## 2025-05-10 RX ADMIN — Medication 2 TABLET(S): at 21:01

## 2025-05-10 RX ADMIN — MIDODRINE HYDROCHLORIDE 10 MILLIGRAM(S): 5 TABLET ORAL at 05:38

## 2025-05-10 RX ADMIN — ATORVASTATIN CALCIUM 80 MILLIGRAM(S): 80 TABLET, FILM COATED ORAL at 21:01

## 2025-05-10 RX ADMIN — POLYETHYLENE GLYCOL 3350 17 GRAM(S): 17 POWDER, FOR SOLUTION ORAL at 17:12

## 2025-05-10 RX ADMIN — MIDODRINE HYDROCHLORIDE 10 MILLIGRAM(S): 5 TABLET ORAL at 13:12

## 2025-05-10 RX ADMIN — HEPARIN SODIUM 11.5 UNIT(S)/HR: 1000 INJECTION INTRAVENOUS; SUBCUTANEOUS at 20:25

## 2025-05-10 RX ADMIN — POLYETHYLENE GLYCOL 3350 17 GRAM(S): 17 POWDER, FOR SOLUTION ORAL at 05:38

## 2025-05-10 RX ADMIN — HEPARIN SODIUM 11.5 UNIT(S)/HR: 1000 INJECTION INTRAVENOUS; SUBCUTANEOUS at 00:00

## 2025-05-10 NOTE — PROGRESS NOTE ADULT - ASSESSMENT
ASSESSMENT/PLAN: R ICA-MCA occlusion; ?ICAD     NEURO:  - S/p angio 5/8, on intervention. Case to be discussed at Vascular Conference, possible stent week of 5/12.  - Q2H neurochecks  - Stroke core measures  - Heparin gtt PTT 50-70 for Right ICA occlusion  Pain: Tylenol PRN  Activity: [x] mobilize as tolerated [] Bedrest [x] PT [x] OT [] PMNR    PULM:  - RA/IS  - Keep O2sat>92%    CV:  - SBP goal 110-160  - Fluid boluses prn  - Midodrine 10TID.  - Lipid wnl.   - Atorvastatin 80mg.   - TTE w/ bubbles unremarkable.    RENAL:  Fluids: IVL  Monitor I&Os, electrolytes.    GI:  Diet: CCD  GI prophylaxis [x] not indicated [] PPI [] other:  Bowel regimen [] colace [x] senna [x] other: miralax   LBM: PTA    ENDO:   Goal euglycemia (-180)  A1C 5.8  AKBAR     HEME/ONC:  VTE prophylaxis: SCDs, heparin gtt  BLE Dopp no DVTs.    ID: monitor for fevers     MISC:    SOCIAL/FAMILY:  [X] awaiting [] updated at bedside --son and wife [] family meeting    CODE STATUS:  [x] Full Code [] DNR [] DNI [] Palliative/Comfort Care    DISPOSITION:  [x] ICU [] Stroke Unit [] Floor [] EMU [] RCU [] PCU    [x] Patient is at high risk of neurologic deterioration/death due to: ICA, MCA occlusion, acute stroke    ASSESSMENT/PLAN: R ICA-MCA occlusion; ?ICAD     NEURO:  - S/p angio 5/8, on intervention. Case to be discussed at Vascular Conference, possible stent week of 5/12.  - Q2H neurochecks  - Stroke core measures  - Heparin gtt PTT 50-70 for Right ICA occlusion  Pain: Tylenol PRN  Activity: [x] mobilize as tolerated [] Bedrest [x] PT [x] OT [] PMNR    PULM:  - RA/IS  - Keep O2sat>92%    CV:  - SBP goal 100-160  - Fluid boluses prn  - Midodrine 10TID.  - Lipid wnl.   - Atorvastatin 80mg.   - TTE w/ bubbles unremarkable.    RENAL:  Fluids: IVL  Monitor I&Os, electrolytes.    GI:  Diet: CCD  GI prophylaxis [x] not indicated [] PPI [] other:  Bowel regimen [] colace [x] senna [x] other: miralax   LBM: PTA  Mag citrate.    ENDO:   Goal euglycemia (-180)  A1C 5.8  AKBAR     HEME/ONC:  VTE prophylaxis: SCDs, heparin gtt  BLE Dopp no DVTs.    ID: monitor for fevers     MISC:    SOCIAL/FAMILY:  [X] awaiting [] updated at bedside --son and wife [] family meeting    CODE STATUS:  [x] Full Code [] DNR [] DNI [] Palliative/Comfort Care    DISPOSITION:  [x] ICU [] Stroke Unit [] Floor [] EMU [] RCU [] PCU    [x] Patient is at high risk of neurologic deterioration/death due to: ICA, MCA occlusion, acute stroke

## 2025-05-10 NOTE — PROGRESS NOTE ADULT - SUBJECTIVE AND OBJECTIVE BOX
SUMMARY:  64M, PMHx HTN and HLD not on AC/AP presenting to [facility] as a code stroke for left sided weakness. LKW 7AM 5/5. Patient then felt LUE/LLE weakness. Had an episode of speech disturbance also around 9:30 AM, that resolved after unknown duration of time. Also, over the past few months, patient has had numbness in LUE/LLE coming and going. Denies HA, vision changes.    ICU COURSE:  5/5: admitted to NSCU for hemodynamic monitoring to see if neurological exam is pressure dependent.  5/6-5/7: no acute events.  5/8: s/p diagnostic angio confirming significant Right ICA stenosis but improving MCA clot burden, no stenting performed.  5/9: SBP liberalized to 110-160 from 120-160.  5/10: SBP liberalized to 100-160 from 110-160.    ADMISSION SCORES:   GCS: 15    REVIEW OF SYSTEMS: None other than stated in HPI    ALLERGIES: Allergies    No Known Allergies    Intolerances        ICU Vital Signs Last 24 Hrs  T(C): 36.8 (10 May 2025 07:00), Max: 37.4 (09 May 2025 19:00)  T(F): 98.2 (10 May 2025 07:00), Max: 99.4 (09 May 2025 19:00)  HR: 47 (10 May 2025 07:00) (47 - 75)  BP: 129/70 (10 May 2025 07:00) (110/64 - 177/90)  BP(mean): 94 (10 May 2025 07:00) (81 - 127)  ABP: 116/52 (10 May 2025 07:00) (100/68 - 165/69)  ABP(mean): 74 (10 May 2025 07:00) (72 - 132)  RR: 12 (10 May 2025 07:00) (12 - 20)  SpO2: 100% (10 May 2025 07:00) (92% - 100%)    O2 Parameters below as of 10 May 2025 07:00  Patient On (Oxygen Delivery Method): room air            LABS:                        12.4   9.91  )-----------( 279      ( 09 May 2025 22:45 )             37.6       05-09    138  |  107  |  13  ----------------------------<  113[H]  4.2   |  21[L]  |  0.80    Ca    9.0      09 May 2025 22:42  Phos  3.3     05-09  Mg     1.9     05-09                PT/INR - ( 09 May 2025 09:40 )   PT: 12.8 sec;   INR: 1.11 ratio         PTT - ( 10 May 2025 05:58 )  PTT:71.5 sec            I&O's Summary    09 May 2025 07:01  -  10 May 2025 07:00  --------------------------------------------------------  IN: 3655.8 mL / OUT: 4530 mL / NET: -874.2 mL              MEDICATIONS  (STANDING):  atorvastatin 80 milliGRAM(s) Oral at bedtime  heparin  Infusion 1100 Unit(s)/Hr (11 mL/Hr) IV Continuous <Continuous>  midodrine 10 milliGRAM(s) Oral every 8 hours  norepinephrine Infusion 0.05 MICROgram(s)/kG/Min (7.23 mL/Hr) IV Continuous <Continuous>  polyethylene glycol 3350 17 Gram(s) Oral two times a day  senna 2 Tablet(s) Oral at bedtime    MEDICATIONS  (PRN):  acetaminophen     Tablet .. 650 milliGRAM(s) Oral every 6 hours PRN Temp greater or equal to 38C (100.4F), Mild Pain (1 - 3)  acetaminophen   IVPB .. 1000 milliGRAM(s) IV Intermittent every 6 hours PRN Mild Pain (1 - 3)  bisacodyl 5 milliGRAM(s) Oral every 12 hours PRN Constipation          EXAMINATION:  General: No acute distress  HEENT: Anicteric sclerae  Cardiac: [id number]  Lungs: Clear  Abdomen: Soft, non-tender, +BS  Extremities: No c/c/e  Skin/Incision Site: Clean, dry and intact  Neurologic: AOx3, EOMI, no facial, no drift, DIAZ 5/5, Right fingers amputated.

## 2025-05-10 NOTE — PROGRESS NOTE ADULT - SUBJECTIVE AND OBJECTIVE BOX
Patient seen and examined at bedside.    --Anticoagulation--  heparin  Infusion 1000 Unit(s)/Hr IV Continuous <Continuous>    T(C): 37.3 (05-09-25 @ 23:00), Max: 37.4 (05-09-25 @ 19:00)  HR: 61 (05-10-25 @ 01:00) (49 - 80)  BP: 122/65 (05-10-25 @ 01:00) (110/64 - 177/90)  RR: 17 (05-10-25 @ 01:00) (12 - 20)  SpO2: 100% (05-10-25 @ 01:00) (92% - 100%)  Wt(kg): --    Exam: AOx3, EOMI, no drift, DIAZ 5/5 (Rt fingers amputated)

## 2025-05-10 NOTE — PROGRESS NOTE ADULT - SUBJECTIVE AND OBJECTIVE BOX
SUMMARY:  64M, PMHx HTN and HLD not on AC/AP presenting to [facility] as a code stroke for left sided weakness. LKW 7AM 5/5. Patient then felt LUE/LLE weakness. Had an episode of speech disturbance also around 9:30 AM, that resolved after unknown duration of time. Also, over the past few months, patient has had numbness in LUE/LLE coming and going. Denies HA, vision changes.    ICU COURSE:  5/5: admitted to NSCU for hemodynamic monitoring to see if neurological exam is pressure dependent.  5/6-5/7: no acute events.  5/8: s/p diagnostic angio confirming significant Right ICA stenosis but improving MCA clot burden, no stenting performed.  5/9: SBP liberalized to 110-160 from 120-160.  5/10: SBP liberalized to 100-160 from 110-160.    ADMISSION SCORES:   GCS: 15    REVIEW OF SYSTEMS: None other than stated in HPI    ALLERGIES: Allergies    No Known Allergies    Intolerances        ICU Vital Signs Last 24 Hrs  T(C): 36.8 (10 May 2025 07:00), Max: 37.4 (09 May 2025 19:00)  T(F): 98.2 (10 May 2025 07:00), Max: 99.4 (09 May 2025 19:00)  HR: 47 (10 May 2025 07:00) (47 - 75)  BP: 129/70 (10 May 2025 07:00) (110/64 - 177/90)  BP(mean): 94 (10 May 2025 07:00) (81 - 127)  ABP: 116/52 (10 May 2025 07:00) (100/68 - 165/69)  ABP(mean): 74 (10 May 2025 07:00) (72 - 132)  RR: 12 (10 May 2025 07:00) (12 - 20)  SpO2: 100% (10 May 2025 07:00) (92% - 100%)    O2 Parameters below as of 10 May 2025 07:00  Patient On (Oxygen Delivery Method): room air            LABS:                        12.4   9.91  )-----------( 279      ( 09 May 2025 22:45 )             37.6       05-09    138  |  107  |  13  ----------------------------<  113[H]  4.2   |  21[L]  |  0.80    Ca    9.0      09 May 2025 22:42  Phos  3.3     05-09  Mg     1.9     05-09                PT/INR - ( 09 May 2025 09:40 )   PT: 12.8 sec;   INR: 1.11 ratio         PTT - ( 10 May 2025 05:58 )  PTT:71.5 sec            I&O's Summary    09 May 2025 07:01  -  10 May 2025 07:00  --------------------------------------------------------  IN: 3655.8 mL / OUT: 4530 mL / NET: -874.2 mL              MEDICATIONS  (STANDING):  atorvastatin 80 milliGRAM(s) Oral at bedtime  heparin  Infusion 1100 Unit(s)/Hr (11 mL/Hr) IV Continuous <Continuous>  midodrine 10 milliGRAM(s) Oral every 8 hours  norepinephrine Infusion 0.05 MICROgram(s)/kG/Min (7.23 mL/Hr) IV Continuous <Continuous>  polyethylene glycol 3350 17 Gram(s) Oral two times a day  senna 2 Tablet(s) Oral at bedtime    MEDICATIONS  (PRN):  acetaminophen     Tablet .. 650 milliGRAM(s) Oral every 6 hours PRN Temp greater or equal to 38C (100.4F), Mild Pain (1 - 3)  acetaminophen   IVPB .. 1000 milliGRAM(s) IV Intermittent every 6 hours PRN Mild Pain (1 - 3)  bisacodyl 5 milliGRAM(s) Oral every 12 hours PRN Constipation          EXAMINATION:  General: No acute distress  HEENT: Anicteric sclerae  Cardiac: [id number]  Lungs: Clear  Abdomen: Soft, non-tender, +BS  Extremities: No c/c/e  Skin/Incision Site: Clean, dry and intact  Neurologic: AOx3, EOMI, no facial, no drift, DIAZ 5/5, Right fingers amputated. SUMMARY:  64M, PMHx HTN and HLD not on AC/AP presenting to [facility] as a code stroke for left sided weakness. LKW 7AM 5/5. Patient then felt LUE/LLE weakness. Had an episode of speech disturbance also around 9:30 AM, that resolved after unknown duration of time. Also, over the past few months, patient has had numbness in LUE/LLE coming and going. Denies HA, vision changes.    ICU COURSE:  5/5: admitted to NSCU for hemodynamic monitoring to see if neurological exam is pressure dependent.  5/6-5/7: no acute events.  5/8: s/p diagnostic angio confirming significant Right ICA stenosis but improving MCA clot burden, no stenting performed.  5/9: SBP liberalized to 110-160 from 120-160.  5/10: SBP liberalized to 100-160 from 110-160.    ADMISSION SCORES:   GCS: 15    REVIEW OF SYSTEMS: None other than stated in HPI    ALLERGIES: Allergies    No Known Allergies    Intolerances        ICU Vital Signs Last 24 Hrs  T(C): 36.9 (10 May 2025 23:00), Max: 37.2 (10 May 2025 03:00)  T(F): 98.4 (10 May 2025 23:00), Max: 99 (10 May 2025 03:00)  HR: 53 (11 May 2025 00:00) (47 - 71)  BP: 118/64 (11 May 2025 00:00) (95/51 - 147/74)  BP(mean): 86 (11 May 2025 00:00) (70 - 104)  ABP: 102/49 (11 May 2025 00:00) (100/51 - 129/73)  ABP(mean): 68 (11 May 2025 00:00) (68 - 93)  RR: 16 (11 May 2025 00:00) (12 - 20)  SpO2: 100% (11 May 2025 00:00) (97% - 100%)    O2 Parameters below as of 10 May 2025 19:00  Patient On (Oxygen Delivery Method): room air      LABS:                          12.4   9.91  )-----------( 279      ( 09 May 2025 22:45 )             37.6     05-09    138  |  107  |  13  ----------------------------<  113[H]  4.2   |  21[L]  |  0.80    Ca    9.0      09 May 2025 22:42  Phos  3.3     05-09  Mg     1.9     05-09        PT/INR - ( 09 May 2025 09:40 )   PT: 12.8 sec;   INR: 1.11 ratio         PTT - ( 10 May 2025 20:06 )  PTT:50.9 sec  Urinalysis Basic - ( 09 May 2025 22:42 )    Color: x / Appearance: x / SG: x / pH: x  Gluc: 113 mg/dL / Ketone: x  / Bili: x / Urobili: x   Blood: x / Protein: x / Nitrite: x   Leuk Esterase: x / RBC: x / WBC x   Sq Epi: x / Non Sq Epi: x / Bacteria: x              MEDICATIONS  (STANDING):  atorvastatin 80 milliGRAM(s) Oral at bedtime  heparin  Infusion 1100 Unit(s)/Hr (11 mL/Hr) IV Continuous <Continuous>  midodrine 10 milliGRAM(s) Oral every 8 hours  norepinephrine Infusion 0.05 MICROgram(s)/kG/Min (7.23 mL/Hr) IV Continuous <Continuous>  polyethylene glycol 3350 17 Gram(s) Oral two times a day  senna 2 Tablet(s) Oral at bedtime    MEDICATIONS  (PRN):  acetaminophen     Tablet .. 650 milliGRAM(s) Oral every 6 hours PRN Temp greater or equal to 38C (100.4F), Mild Pain (1 - 3)  acetaminophen   IVPB .. 1000 milliGRAM(s) IV Intermittent every 6 hours PRN Mild Pain (1 - 3)  bisacodyl 5 milliGRAM(s) Oral every 12 hours PRN Constipation          EXAMINATION:  General: No acute distress  HEENT: Anicteric sclerae  Cardiac: [id number]  Lungs: Clear  Abdomen: Soft, non-tender, +BS  Extremities: No c/c/e  Skin/Incision Site: Clean, dry and intact  Neurologic: AOx3, EOMI, no facial, no drift, DIAZ 5/5, Right fingers amputated.

## 2025-05-10 NOTE — PROGRESS NOTE ADULT - ASSESSMENT
ASSESSMENT/PLAN: R ICA-MCA occlusion; ?ICAD     NEURO:  - S/p angio 5/8, on intervention. Case to be discussed at Vascular Conference, possible stent week of 5/12.  - Q2H neurochecks  - Stroke core measures  - Heparin gtt PTT 50-70 for Right ICA occlusion  Pain: Tylenol PRN  Activity: [x] mobilize as tolerated [] Bedrest [x] PT [x] OT [] PMNR    PULM:  - RA/IS  - Keep O2sat>92%    CV:  - SBP goal 100-160  - Fluid boluses prn  - Midodrine 10TID.  - Lipid wnl.   - Atorvastatin 80mg.   - TTE w/ bubbles unremarkable.    RENAL:  Fluids: IVL  Monitor I&Os, electrolytes.    GI:  Diet: CCD  GI prophylaxis [x] not indicated [] PPI [] other:  Bowel regimen [] colace [x] senna [x] other: miralax   LBM: PTA  Mag citrate.    ENDO:   Goal euglycemia (-180)  A1C 5.8  AKBAR     HEME/ONC:  VTE prophylaxis: SCDs, heparin gtt  BLE Dopp no DVTs.    ID: monitor for fevers     MISC:    SOCIAL/FAMILY:  [X] awaiting [] updated at bedside --son and wife [] family meeting    CODE STATUS:  [x] Full Code [] DNR [] DNI [] Palliative/Comfort Care    DISPOSITION:  [x] ICU [] Stroke Unit [] Floor [] EMU [] RCU [] PCU    [x] Patient is at high risk of neurologic deterioration/death due to: ICA, MCA occlusion, acute stroke    ASSESSMENT/PLAN: R ICA-MCA occlusion; ?ICAD     NEURO:  - S/p angio 5/8, on intervention. Case to be discussed at Vascular Conference, possible stent week of 5/12.  - Q4H neurochecks  - Stroke core measures  - Heparin gtt PTT 50-70 for Right ICA occlusion  Pain: Tylenol PRN  Activity: [x] mobilize as tolerated [] Bedrest [x] PT [x] OT [] PMNR    PULM:  - RA/IS  - Keep O2sat>92%    CV:  - SBP goal 100-160  - Fluid boluses prn  - Midodrine 10TID.  - Lipid wnl.   - Atorvastatin 80mg.   - TTE w/ bubbles unremarkable.    RENAL:  Fluids: IVL  Monitor I&Os, electrolytes.    GI:  Diet: CCD  GI prophylaxis [x] not indicated [] PPI [] other:  Bowel regimen [] colace [x] senna [x] other: miralax   LBM: 5/10      ENDO:   Goal euglycemia (-180)  A1C 5.8  AKBAR     HEME/ONC:  VTE prophylaxis: SCDs, heparin gtt  BLE Dopp no DVTs.    ID: monitor for fevers     MISC:    SOCIAL/FAMILY:  [X] awaiting [] updated at bedside --son and wife [] family meeting    CODE STATUS:  [x] Full Code [] DNR [] DNI [] Palliative/Comfort Care    DISPOSITION:  [x] ICU [] Stroke Unit [] Floor [] EMU [] RCU [] PCU    [x] Patient is at high risk of neurologic deterioration/death due to: ICA, MCA occlusion, acute stroke

## 2025-05-11 LAB
ANION GAP SERPL CALC-SCNC: 11 MMOL/L — SIGNIFICANT CHANGE UP (ref 5–17)
APTT BLD: 52 SEC — HIGH (ref 26.1–36.8)
BUN SERPL-MCNC: 13 MG/DL — SIGNIFICANT CHANGE UP (ref 7–23)
CALCIUM SERPL-MCNC: 9.2 MG/DL — SIGNIFICANT CHANGE UP (ref 8.4–10.5)
CHLORIDE SERPL-SCNC: 107 MMOL/L — SIGNIFICANT CHANGE UP (ref 96–108)
CO2 SERPL-SCNC: 20 MMOL/L — LOW (ref 22–31)
CREAT SERPL-MCNC: 0.83 MG/DL — SIGNIFICANT CHANGE UP (ref 0.5–1.3)
EGFR: 97 ML/MIN/1.73M2 — SIGNIFICANT CHANGE UP
EGFR: 97 ML/MIN/1.73M2 — SIGNIFICANT CHANGE UP
GLUCOSE SERPL-MCNC: 89 MG/DL — SIGNIFICANT CHANGE UP (ref 70–99)
MAGNESIUM SERPL-MCNC: 1.8 MG/DL — SIGNIFICANT CHANGE UP (ref 1.6–2.6)
PHOSPHATE SERPL-MCNC: 3.6 MG/DL — SIGNIFICANT CHANGE UP (ref 2.5–4.5)
POTASSIUM SERPL-MCNC: 4 MMOL/L — SIGNIFICANT CHANGE UP (ref 3.5–5.3)
POTASSIUM SERPL-SCNC: 4 MMOL/L — SIGNIFICANT CHANGE UP (ref 3.5–5.3)
SODIUM SERPL-SCNC: 138 MMOL/L — SIGNIFICANT CHANGE UP (ref 135–145)

## 2025-05-11 PROCEDURE — 99291 CRITICAL CARE FIRST HOUR: CPT

## 2025-05-11 PROCEDURE — 70450 CT HEAD/BRAIN W/O DYE: CPT | Mod: 26

## 2025-05-11 RX ORDER — MAGNESIUM CITRATE
296 SOLUTION, ORAL ORAL ONCE
Refills: 0 | Status: COMPLETED | OUTPATIENT
Start: 2025-05-11 | End: 2025-05-11

## 2025-05-11 RX ORDER — MAGNESIUM SULFATE 500 MG/ML
2 SYRINGE (ML) INJECTION ONCE
Refills: 0 | Status: COMPLETED | OUTPATIENT
Start: 2025-05-11 | End: 2025-05-11

## 2025-05-11 RX ADMIN — MIDODRINE HYDROCHLORIDE 10 MILLIGRAM(S): 5 TABLET ORAL at 14:34

## 2025-05-11 RX ADMIN — ATORVASTATIN CALCIUM 80 MILLIGRAM(S): 80 TABLET, FILM COATED ORAL at 22:23

## 2025-05-11 RX ADMIN — Medication 27 GRAM(S): at 05:29

## 2025-05-11 RX ADMIN — POLYETHYLENE GLYCOL 3350 17 GRAM(S): 17 POWDER, FOR SOLUTION ORAL at 05:29

## 2025-05-11 RX ADMIN — MIDODRINE HYDROCHLORIDE 10 MILLIGRAM(S): 5 TABLET ORAL at 22:24

## 2025-05-11 RX ADMIN — Medication 2 TABLET(S): at 22:24

## 2025-05-11 RX ADMIN — MIDODRINE HYDROCHLORIDE 10 MILLIGRAM(S): 5 TABLET ORAL at 05:28

## 2025-05-11 NOTE — PROGRESS NOTE ADULT - ASSESSMENT
#R ICA/MCA stenosis  #relative hypotension    heparin @11.5  Q5 checks   stroke core measures  IS  -160  cont midodrine 10mg Q8  statin 80mg   LBM 5/11 64M p/w L sided weakness, OOW for TNK, not a candidate for thrombectomy  #R ICA/MCA stenosis  #relative hypotension    Exam: no neurologic deficits  vitals/data/orders reviewed    Plan -  NC q4h  pending discussion @vascular conference  heparin @11.5, PTT in range 50-70  -160, cont midodrine 10mg Q8  CCD diet, statin 80mg   having small BMs, will advance bowel regimen  SCDs    Critical care time: 30 min

## 2025-05-11 NOTE — PROGRESS NOTE ADULT - SUBJECTIVE AND OBJECTIVE BOX
SUMMARY:  64M, PMHx HTN and HLD not on AC/AP presenting to [facility] as a code stroke for left sided weakness. LKW 7AM 5/5. Patient then felt LUE/LLE weakness. Had an episode of speech disturbance also around 9:30 AM, that resolved after unknown duration of time. Also, over the past few months, patient has had numbness in LUE/LLE coming and going. Denies HA, vision changes.    ICU COURSE:  5/5: admitted to NSCU for hemodynamic monitoring to see if neurological exam is pressure dependent.  5/6-5/7: no acute events.  5/8: s/p diagnostic angio confirming significant Right ICA stenosis but improving MCA clot burden, no stenting performed.  5/9: SBP liberalized to 110-160 from 120-160.  5/10: SBP liberalized to 100-160 from 110-160.    ADMISSION SCORES:   GCS: 15    REVIEW OF SYSTEMS: None other than stated in HPI    ALLERGIES: Allergies  ICU Vital Signs Last 24 Hrs  T(C): 36.7 (11 May 2025 03:00), Max: 36.9 (10 May 2025 23:00)  T(F): 98 (11 May 2025 03:00), Max: 98.4 (10 May 2025 23:00)  HR: 54 (11 May 2025 04:00) (47 - 71)  BP: 126/64 (11 May 2025 04:00) (95/51 - 147/74)  BP(mean): 87 (11 May 2025 04:00) (70 - 104)  ABP: 104/49 (11 May 2025 04:00) (97/48 - 129/62)  ABP(mean): 69 (11 May 2025 04:00) (66 - 87)  RR: 16 (11 May 2025 04:00) (12 - 20)  SpO2: 99% (11 May 2025 04:00) (97% - 100%)    O2 Parameters below as of 10 May 2025 19:00  Patient On (Oxygen Delivery Method): room air          No Known Allergies    Intolerances      LABS:                          12.4   9.91  )-----------( 279      ( 09 May 2025 22:45 )             37.6     05-11    138  |  107  |  13  ----------------------------<  89  4.0   |  20[L]  |  0.83    Ca    9.2      11 May 2025 01:53  Phos  3.6     05-11  Mg     1.8     05-11        PT/INR - ( 09 May 2025 09:40 )   PT: 12.8 sec;   INR: 1.11 ratio         PTT - ( 11 May 2025 01:54 )  PTT:52.0 sec  Urinalysis Basic - ( 11 May 2025 01:53 )    Color: x / Appearance: x / SG: x / pH: x  Gluc: 89 mg/dL / Ketone: x  / Bili: x / Urobili: x   Blood: x / Protein: x / Nitrite: x   Leuk Esterase: x / RBC: x / WBC x   Sq Epi: x / Non Sq Epi: x / Bacteria: x                  MEDICATIONS  (STANDING):  atorvastatin 80 milliGRAM(s) Oral at bedtime  heparin  Infusion 1100 Unit(s)/Hr (11 mL/Hr) IV Continuous <Continuous>  midodrine 10 milliGRAM(s) Oral every 8 hours  norepinephrine Infusion 0.05 MICROgram(s)/kG/Min (7.23 mL/Hr) IV Continuous <Continuous>  polyethylene glycol 3350 17 Gram(s) Oral two times a day  senna 2 Tablet(s) Oral at bedtime    MEDICATIONS  (PRN):  acetaminophen     Tablet .. 650 milliGRAM(s) Oral every 6 hours PRN Temp greater or equal to 38C (100.4F), Mild Pain (1 - 3)  acetaminophen   IVPB .. 1000 milliGRAM(s) IV Intermittent every 6 hours PRN Mild Pain (1 - 3)  bisacodyl 5 milliGRAM(s) Oral every 12 hours PRN Constipation          EXAMINATION:  General: No acute distress  HEENT: Anicteric sclerae  Cardiac: [id number]  Lungs: Clear  Abdomen: Soft, non-tender, +BS  Extremities: No c/c/e  Skin/Incision Site: Clean, dry and intact  Neurologic: AOx3, EOMI, no facial, no drift, DIAZ 5/5, Right fingers amputated.

## 2025-05-11 NOTE — PROGRESS NOTE ADULT - ASSESSMENT
ASSESSMENT/PLAN: R ICA-MCA occlusion; ?ICAD     NEURO:  - S/p angio 5/8, on intervention. Case to be discussed at Vascular Conference, possible stent week of 5/12.  - Q4H neurochecks  - Stroke core measures  - Heparin gtt PTT 50-70 for Right ICA occlusion  Pain: Tylenol PRN  Activity: [x] mobilize as tolerated [] Bedrest [x] PT [x] OT [] PMNR    PULM:  - RA/IS  - Keep O2sat>92%    CV:  - SBP goal 100-160  - Fluid boluses prn  - Midodrine 10TID.  - Lipid wnl.   - Atorvastatin 80mg.   - TTE w/ bubbles unremarkable.    RENAL:  Fluids: IVL  Monitor I&Os, electrolytes.    GI:  Diet: CCD  GI prophylaxis [x] not indicated [] PPI [] other:  Bowel regimen [] colace [x] senna [x] other: miralax   LBM: 5/10      ENDO:   Goal euglycemia (-180)  A1C 5.8  AKBAR     HEME/ONC:  VTE prophylaxis: SCDs, heparin gtt  BLE Dopp no DVTs.    ID: monitor for fevers     MISC:    SOCIAL/FAMILY:  [X] awaiting [] updated at bedside --son and wife [] family meeting    CODE STATUS:  [x] Full Code [] DNR [] DNI [] Palliative/Comfort Care    DISPOSITION:  [x] ICU [] Stroke Unit [] Floor [] EMU [] RCU [] PCU    [x] Patient is at high risk of neurologic deterioration/death due to: ICA, MCA occlusion, acute stroke

## 2025-05-11 NOTE — PROGRESS NOTE ADULT - SUBJECTIVE AND OBJECTIVE BOX
Patient seen and examined at bedside.    --Anticoagulation--  heparin  Infusion 1150 Unit(s)/Hr IV Continuous <Continuous>    T(C): 36.7 (05-11-25 @ 03:00), Max: 36.9 (05-10-25 @ 23:00)  HR: 52 (05-11-25 @ 06:00) (47 - 71)  BP: 125/63 (05-11-25 @ 06:00) (94/78 - 147/74)  RR: 14 (05-11-25 @ 06:00) (12 - 21)  SpO2: 99% (05-11-25 @ 06:00) (97% - 100%)  Wt(kg): --    Exam:  AOx3, EOMI, no drift, DIAZ 5/5 (Rt fingers amputated)

## 2025-05-12 LAB
APTT BLD: 37.8 SEC — HIGH (ref 26.1–36.8)
APTT BLD: 43.2 SEC — HIGH (ref 26.1–36.8)
APTT BLD: 48.9 SEC — HIGH (ref 26.1–36.8)
GLUCOSE BLDC GLUCOMTR-MCNC: 111 MG/DL — HIGH (ref 70–99)
GLUCOSE BLDC GLUCOMTR-MCNC: 95 MG/DL — SIGNIFICANT CHANGE UP (ref 70–99)
HCT VFR BLD CALC: 38.6 % — LOW (ref 39–50)
HGB BLD-MCNC: 12.6 G/DL — LOW (ref 13–17)
INR BLD: 1.1 RATIO — SIGNIFICANT CHANGE UP (ref 0.85–1.16)
MCHC RBC-ENTMCNC: 29.3 PG — SIGNIFICANT CHANGE UP (ref 27–34)
MCHC RBC-ENTMCNC: 32.6 G/DL — SIGNIFICANT CHANGE UP (ref 32–36)
MCV RBC AUTO: 89.8 FL — SIGNIFICANT CHANGE UP (ref 80–100)
NRBC BLD AUTO-RTO: 0 /100 WBCS — SIGNIFICANT CHANGE UP (ref 0–0)
PLATELET # BLD AUTO: 310 K/UL — SIGNIFICANT CHANGE UP (ref 150–400)
PROTHROM AB SERPL-ACNC: 12.5 SEC — SIGNIFICANT CHANGE UP (ref 9.9–13.4)
RBC # BLD: 4.3 M/UL — SIGNIFICANT CHANGE UP (ref 4.2–5.8)
RBC # FLD: 13.1 % — SIGNIFICANT CHANGE UP (ref 10.3–14.5)
WBC # BLD: 8.17 K/UL — SIGNIFICANT CHANGE UP (ref 3.8–10.5)
WBC # FLD AUTO: 8.17 K/UL — SIGNIFICANT CHANGE UP (ref 3.8–10.5)

## 2025-05-12 PROCEDURE — 99232 SBSQ HOSP IP/OBS MODERATE 35: CPT

## 2025-05-12 RX ADMIN — ATORVASTATIN CALCIUM 80 MILLIGRAM(S): 80 TABLET, FILM COATED ORAL at 21:19

## 2025-05-12 RX ADMIN — Medication 2 TABLET(S): at 21:19

## 2025-05-12 RX ADMIN — MIDODRINE HYDROCHLORIDE 10 MILLIGRAM(S): 5 TABLET ORAL at 06:40

## 2025-05-12 RX ADMIN — Medication 296 MILLILITER(S): at 00:01

## 2025-05-12 RX ADMIN — HEPARIN SODIUM 13 UNIT(S)/HR: 1000 INJECTION INTRAVENOUS; SUBCUTANEOUS at 11:05

## 2025-05-12 RX ADMIN — HEPARIN SODIUM 12 UNIT(S)/HR: 1000 INJECTION INTRAVENOUS; SUBCUTANEOUS at 02:37

## 2025-05-12 RX ADMIN — MIDODRINE HYDROCHLORIDE 10 MILLIGRAM(S): 5 TABLET ORAL at 13:54

## 2025-05-12 RX ADMIN — POLYETHYLENE GLYCOL 3350 17 GRAM(S): 17 POWDER, FOR SOLUTION ORAL at 06:40

## 2025-05-12 RX ADMIN — HEPARIN SODIUM 14 UNIT(S)/HR: 1000 INJECTION INTRAVENOUS; SUBCUTANEOUS at 18:02

## 2025-05-12 RX ADMIN — MIDODRINE HYDROCHLORIDE 10 MILLIGRAM(S): 5 TABLET ORAL at 21:19

## 2025-05-12 NOTE — PROGRESS NOTE ADULT - ASSESSMENT
64M  pmhx HTN, HLD, not on AC/AP, presented with L sided weakness and transient speech disturbance (although now symptoms appear to have completely resolved), i/s/o R ICA severe stenosis (likely dissection) as well as R M2 occlusion. Pt on heparin gtt, may undergo R ICA stent pending further NSGY discussion.    Impression: L hemiparesis and speech disturbance (symptoms now resolved and pt back to baseline) 2/2 acute ischemic stroke i/s/o R ICA severe stenosis (likely dissection) as well as R M2 occlusion.    Recommendations:   -May undergo R ICA stenting pending NSGY discussion  -Currently on Heparin gtt given R ICA severe stenosis (likely dissection). If undergoes stent placement, Antiplatelets per NSGY.  -C/w Atorvastatin 80mg qhs  -MRI Brain and MRA H/N performed on 5/6/25  -TTE 5/6/25 unremarkable  -LDL 60, a1c 5.8    - Glucose control   - Stroke education and counseling  - Neuro-checks and VS per NSCU  - SBP Goal 100-160.  - aspiration, fall precautions  - STAT CT head non-contrast for change in neuro exam.   - PT/ OT / DVT ppx per primary team     Case seen on rounds with stroke attending Dr. Mann

## 2025-05-12 NOTE — PROGRESS NOTE ADULT - SUBJECTIVE AND OBJECTIVE BOX
SUMMARY:  64M, PMHx HTN and HLD not on AC/AP presenting to [facility] as a code stroke for left sided weakness. LKW 7AM 5/5. Patient then felt LUE/LLE weakness. Had an episode of speech disturbance also around 9:30 AM, that resolved after unknown duration of time. Also, over the past few months, patient has had numbness in LUE/LLE coming and going. Denies HA, vision changes.    ICU COURSE:  5/5: admitted to NSCU for hemodynamic monitoring to see if neurological exam is pressure dependent.  5/6-5/7: no acute events.  5/8: s/p diagnostic angio confirming significant Right ICA stenosis but improving MCA clot burden, no stenting performed.  5/9: SBP liberalized to 110-160 from 120-160.  5/10: SBP liberalized to 100-160 from 110-160.  5/11: no acute events.    ADMISSION SCORES:   GCS: 15    REVIEW OF SYSTEMS: None other than stated in HPI    ALLERGIES: Allergies    No Known Allergies    Intolerances        ICU Vital Signs Last 24 Hrs  T(C): 36.4 (12 May 2025 07:00), Max: 37 (11 May 2025 11:00)  T(F): 97.6 (12 May 2025 07:00), Max: 98.6 (11 May 2025 11:00)  HR: 64 (12 May 2025 07:00) (55 - 68)  BP: 126/76 (12 May 2025 07:00) (112/59 - 163/66)  BP(mean): 95 (12 May 2025 07:00) (78 - 115)  ABP: 107/76 (12 May 2025 07:00) (92/53 - 129/65)  ABP(mean): 88 (12 May 2025 07:00) (66 - 98)  RR: 18 (12 May 2025 07:00) (14 - 26)  SpO2: 99% (12 May 2025 07:00) (96% - 100%)        LABS:                        12.6   8.17  )-----------( 310      ( 12 May 2025 01:58 )             38.6       05-11    138  |  107  |  13  ----------------------------<  89  4.0   |  20[L]  |  0.83    Ca    9.2      11 May 2025 01:53  Phos  3.6     05-11  Mg     1.8     05-11                PTT - ( 12 May 2025 01:58 )  PTT:43.2 sec          MEDICATIONS  (STANDING):  atorvastatin 80 milliGRAM(s) Oral at bedtime  chlorhexidine 4% Liquid 1 Application(s) Topical <User Schedule>  heparin  Infusion 1150 Unit(s)/Hr (12 mL/Hr) IV Continuous <Continuous>  midodrine 10 milliGRAM(s) Oral every 8 hours  polyethylene glycol 3350 17 Gram(s) Oral two times a day  senna 2 Tablet(s) Oral at bedtime    MEDICATIONS  (PRN):  acetaminophen     Tablet .. 650 milliGRAM(s) Oral every 6 hours PRN Temp greater or equal to 38C (100.4F), Mild Pain (1 - 3)  acetaminophen   IVPB .. 1000 milliGRAM(s) IV Intermittent every 6 hours PRN Mild Pain (1 - 3)  bisacodyl 5 milliGRAM(s) Oral every 12 hours PRN Constipation            EXAMINATION:  General: No acute distress  HEENT: Anicteric sclerae  Cardiac: [id number]  Lungs: Clear  Abdomen: Soft, non-tender, +BS  Extremities: No c/c/e  Skin/Incision Site: Clean, dry and intact  Neurologic: AOx3, EOMI, no facial, no drift, DIAZ 5/5, Right fingers amputated.

## 2025-05-12 NOTE — PROGRESS NOTE ADULT - SUBJECTIVE AND OBJECTIVE BOX
Patient seen and examined at bedside.    --Anticoagulation--  heparin  Infusion 1150 Unit(s)/Hr IV Continuous <Continuous>    T(C): 36.9 (05-11-25 @ 15:00), Max: 37 (05-11-25 @ 11:00)  HR: 56 (05-11-25 @ 23:00) (51 - 68)  BP: 124/68 (05-11-25 @ 22:00) (94/78 - 163/66)  RR: 16 (05-11-25 @ 23:00) (14 - 26)  SpO2: 100% (05-11-25 @ 21:00) (96% - 100%)  Wt(kg): --    Exam: AOx3, EOMI, no drift, DIAZ 5/5 (Rt fingers amputated)

## 2025-05-12 NOTE — PROGRESS NOTE ADULT - SUBJECTIVE AND OBJECTIVE BOX
Neurology Progress Note    Patient seen by Uzbek speaking provider  SUBJECTIVE/OBJECTIVE/INTERVAL EVENTS: Patient seen and examined at bedside w/ neuro attending. Pt feels well this AM.     MEDICATIONS  (STANDING):  atorvastatin 80 milliGRAM(s) Oral at bedtime  chlorhexidine 4% Liquid 1 Application(s) Topical <User Schedule>  heparin  Infusion 1150 Unit(s)/Hr (13 mL/Hr) IV Continuous <Continuous>  midodrine 10 milliGRAM(s) Oral every 8 hours  polyethylene glycol 3350 17 Gram(s) Oral two times a day  senna 2 Tablet(s) Oral at bedtime    MEDICATIONS  (PRN):  acetaminophen     Tablet .. 650 milliGRAM(s) Oral every 6 hours PRN Temp greater or equal to 38C (100.4F), Mild Pain (1 - 3)  acetaminophen   IVPB .. 1000 milliGRAM(s) IV Intermittent every 6 hours PRN Mild Pain (1 - 3)  bisacodyl 5 milliGRAM(s) Oral every 12 hours PRN Constipation      VITALS & EXAMINATION:  Vital Signs Last 24 Hrs  T(C): 36.4 (12 May 2025 07:00), Max: 37 (11 May 2025 11:00)  T(F): 97.6 (12 May 2025 07:00), Max: 98.6 (11 May 2025 11:00)  HR: 64 (12 May 2025 07:00) (55 - 68)  BP: 126/76 (12 May 2025 07:00) (120/60 - 163/66)  BP(mean): 95 (12 May 2025 07:00) (82 - 115)  RR: 18 (12 May 2025 07:00) (15 - 26)  SpO2: 99% (12 May 2025 07:00) (96% - 100%)    Neuro Exam:  MS - AAOx3, speech fluent, follows commands  CN - Visual field assessment equivocal bilaterally, would generally drift eyes towards fingers (but pt did not report any blurry vision and could see examiner), EOMI, no gross facial droop  Motor - No drift b/l UE and LE. Resists examiner symmetrically (of note, right fingers are amputated)   Sensory - SILT b/l    LABORATORY:  CBC                       12.6   8.17  )-----------( 310      ( 12 May 2025 01:58 )             38.6     Chem 05-11    138  |  107  |  13  ----------------------------<  89  4.0   |  20[L]  |  0.83    Ca    9.2      11 May 2025 01:53  Phos  3.6     05-11  Mg     1.8     05-11      LFTs   Coagulopathy PTT - ( 12 May 2025 09:29 )  PTT:37.8 sec  Lipid Panel 05-05 Chol 96 LDL -- HDL 22[L] Trig 62  A1c   Cardiac enzymes     U/A Urinalysis Basic - ( 11 May 2025 01:53 )    Color: x / Appearance: x / SG: x / pH: x  Gluc: 89 mg/dL / Ketone: x  / Bili: x / Urobili: x   Blood: x / Protein: x / Nitrite: x   Leuk Esterase: x / RBC: x / WBC x   Sq Epi: x / Non Sq Epi: x / Bacteria: x      CSF  Immunological  Other    STUDIES & IMAGING: (EEG, CT, MR, U/S, TTE/BRANNON):

## 2025-05-13 DIAGNOSIS — E78.5 HYPERLIPIDEMIA, UNSPECIFIED: ICD-10-CM

## 2025-05-13 DIAGNOSIS — Z01.818 ENCOUNTER FOR OTHER PREPROCEDURAL EXAMINATION: ICD-10-CM

## 2025-05-13 DIAGNOSIS — I10 ESSENTIAL (PRIMARY) HYPERTENSION: ICD-10-CM

## 2025-05-13 DIAGNOSIS — Z29.9 ENCOUNTER FOR PROPHYLACTIC MEASURES, UNSPECIFIED: ICD-10-CM

## 2025-05-13 DIAGNOSIS — I65.21 OCCLUSION AND STENOSIS OF RIGHT CAROTID ARTERY: ICD-10-CM

## 2025-05-13 LAB
APTT BLD: 58.3 SEC — HIGH (ref 26.1–36.8)
APTT BLD: 64.8 SEC — HIGH (ref 26.1–36.8)
APTT BLD: 68.8 SEC — HIGH (ref 26.1–36.8)
APTT BLD: 72.7 SEC — HIGH (ref 26.1–36.8)
HCT VFR BLD CALC: 38.8 % — LOW (ref 39–50)
HGB BLD-MCNC: 12.5 G/DL — LOW (ref 13–17)
MCHC RBC-ENTMCNC: 29.1 PG — SIGNIFICANT CHANGE UP (ref 27–34)
MCHC RBC-ENTMCNC: 32.2 G/DL — SIGNIFICANT CHANGE UP (ref 32–36)
MCV RBC AUTO: 90.4 FL — SIGNIFICANT CHANGE UP (ref 80–100)
NRBC BLD AUTO-RTO: 0 /100 WBCS — SIGNIFICANT CHANGE UP (ref 0–0)
PLATELET # BLD AUTO: 292 K/UL — SIGNIFICANT CHANGE UP (ref 150–400)
RBC # BLD: 4.29 M/UL — SIGNIFICANT CHANGE UP (ref 4.2–5.8)
RBC # FLD: 13.2 % — SIGNIFICANT CHANGE UP (ref 10.3–14.5)
WBC # BLD: 7.74 K/UL — SIGNIFICANT CHANGE UP (ref 3.8–10.5)
WBC # FLD AUTO: 7.74 K/UL — SIGNIFICANT CHANGE UP (ref 3.8–10.5)

## 2025-05-13 PROCEDURE — 99223 1ST HOSP IP/OBS HIGH 75: CPT

## 2025-05-13 PROCEDURE — 99232 SBSQ HOSP IP/OBS MODERATE 35: CPT

## 2025-05-13 RX ORDER — MAGNESIUM HYDROXIDE 400 MG/5ML
30 SUSPENSION ORAL DAILY
Refills: 0 | Status: DISCONTINUED | OUTPATIENT
Start: 2025-05-13 | End: 2025-05-14

## 2025-05-13 RX ORDER — BISACODYL 5 MG
5 TABLET, DELAYED RELEASE (ENTERIC COATED) ORAL EVERY 12 HOURS
Refills: 0 | Status: DISCONTINUED | OUTPATIENT
Start: 2025-05-13 | End: 2025-05-14

## 2025-05-13 RX ADMIN — HEPARIN SODIUM 13.5 UNIT(S)/HR: 1000 INJECTION INTRAVENOUS; SUBCUTANEOUS at 19:55

## 2025-05-13 RX ADMIN — MIDODRINE HYDROCHLORIDE 10 MILLIGRAM(S): 5 TABLET ORAL at 15:05

## 2025-05-13 RX ADMIN — HEPARIN SODIUM 14 UNIT(S)/HR: 1000 INJECTION INTRAVENOUS; SUBCUTANEOUS at 05:18

## 2025-05-13 RX ADMIN — HEPARIN SODIUM 13.5 UNIT(S)/HR: 1000 INJECTION INTRAVENOUS; SUBCUTANEOUS at 10:12

## 2025-05-13 RX ADMIN — ATORVASTATIN CALCIUM 80 MILLIGRAM(S): 80 TABLET, FILM COATED ORAL at 22:14

## 2025-05-13 RX ADMIN — POLYETHYLENE GLYCOL 3350 17 GRAM(S): 17 POWDER, FOR SOLUTION ORAL at 18:36

## 2025-05-13 RX ADMIN — Medication 2 TABLET(S): at 22:14

## 2025-05-13 RX ADMIN — MIDODRINE HYDROCHLORIDE 10 MILLIGRAM(S): 5 TABLET ORAL at 05:15

## 2025-05-13 RX ADMIN — Medication 5 MILLIGRAM(S): at 18:36

## 2025-05-13 RX ADMIN — MAGNESIUM HYDROXIDE 30 MILLILITER(S): 400 SUSPENSION ORAL at 20:06

## 2025-05-13 RX ADMIN — MIDODRINE HYDROCHLORIDE 10 MILLIGRAM(S): 5 TABLET ORAL at 22:16

## 2025-05-13 NOTE — PROGRESS NOTE ADULT - ASSESSMENT
64M, PMHx HTN and HLD not on AC/AP presenting to [facility] as a code stroke for left sided weakness. LKW 7AM 5/5. Patient then felt LUE/LLE weakness. Had an episode of speech disturbance also around 9:30 AM, that resolved after unknown duration of time. Also, over the past few months, patient has had numbness in LUE/LLE coming and going. Denies HA, vision changes.    ICU COURSE:  5/5: admitted to NSCU for hemodynamic monitoring to see if neurological exam is pressure dependent.  5/8: s/p diagnostic angio confirming significant Right ICA stenosis but improving MCA clot burden, no stenting performed.  5/9: SBP liberalized to 110-160 from 120-160.  5/10: SBP liberalized to 100-160 from 110-160.  5/12 downgraded to neurosurgery floor    PLAN:  - neuro and vital check Q4hrs  Discharge planning:    64M, PMHx HTN and HLD not on AC/AP presenting to [facility] as a code stroke for left sided weakness. LKW 7AM 5/5. Patient then felt LUE/LLE weakness. Had an episode of speech disturbance also around 9:30 AM, that resolved after unknown duration of time. Also, over the past few months, patient has had numbness in LUE/LLE coming and going. Denies HA, vision changes.    ICU COURSE:  5/5: admitted to NSCU for hemodynamic monitoring to see if neurological exam is pressure dependent.  5/8: s/p diagnostic angio confirming significant Right ICA stenosis but improving MCA clot burden, no stenting performed.  5/9: SBP liberalized to 110-160 from 120-160.  5/10: SBP liberalized to 100-160 from 110-160.  5/12 downgraded to neurosurgery floor  5/13 hospitalist consulted and cleared for further procedure/OR    PLAN:  - neuro and vital check Q4hrs  - continue heparin gtt with PTT goal 50-70 for RMCA clot, PTT this am 72, rate dropped to 1350u/hr, repeat PTT 58, will f/u PTT Q6hrs  - plan to repeat CTA H/N next week to assess clot burden, pending CEA vs angio/stent for OSVALDO stenosis  - pain control with tylenol prn  - SBP 90s to 120s, continue midodrine 10mg Q8hrs  - HLD, continue lipitor  - tolerating CCD diet  - last BM on 5/10, standing dulcolax and mg hydroxide added for bowel regimens  - activity OOB as tolerated  - incentive spirometer  - DVT ppx: b/l SCDs and heparin gtt  Discharge planning: PT/OT- outpatient PT    [facility] [id number]   64M, PMHx HTN and HLD not on AC/AP presenting to [facility] as a code stroke for left sided weakness. LKW 7AM 5/5. Patient then felt LUE/LLE weakness. Had an episode of speech disturbance also around 9:30 AM, that resolved after unknown duration of time. Also, over the past few months, patient has had numbness in LUE/LLE coming and going. Denies HA, vision changes.    ICU COURSE:  5/5: admitted to NSCU for hemodynamic monitoring to see if neurological exam is pressure dependent.  5/8: s/p diagnostic angio confirming significant Right ICA stenosis but improving MCA clot burden, no stenting performed.  5/9: SBP liberalized to 110-160 from 120-160.  5/10: SBP liberalized to 100-160 from 110-160.  5/12 downgraded to neurosurgery floor  5/13 hospitalist consulted and cleared for further procedure/OR    PLAN:  - neuro and vital check Q4hrs  - continue heparin gtt with PTT goal 50-70 for RMCA clot, PTT this am 72, rate dropped to 1350u/hr, repeat PTT 58, will f/u PTT Q6hrs  - plan to repeat CTA H/N next week to assess clot burden, pending CEA vs angio/stent for OSVALDO stenosis  - pain control with tylenol prn  - SBP 90s to 120s, continue midodrine 10mg Q8hrs  - HLD, continue lipitor  - tolerating CCD diet  - last BM on 5/10, standing dulcolax and mg hydroxide added for bowel regimens  - activity OOB as tolerated  - incentive spirometer  - DVT ppx: b/l SCDs and heparin gtt  Discharge planning: PT/OT- outpatient PT    plan to be discussed with Dr. Conley and Dr. Dumont    [facility] [id number]

## 2025-05-13 NOTE — PROGRESS NOTE ADULT - SUBJECTIVE AND OBJECTIVE BOX
OVERNIGHT EVENTS:  Vital Signs Last 24 Hrs  T(C): 36.7 (13 May 2025 12:00), Max: 36.8 (12 May 2025 20:41)  T(F): 98 (13 May 2025 12:00), Max: 98.3 (12 May 2025 20:41)  HR: 62 (13 May 2025 12:00) (58 - 66)  BP: 122/71 (13 May 2025 12:00) (115/72 - 124/72)  BP(mean): --  RR: 18 (13 May 2025 12:00) (18 - 18)  SpO2: 96% (13 May 2025 12:00) (95% - 99%)    Parameters below as of 13 May 2025 12:00  Patient On (Oxygen Delivery Method): room air        I&O's Detail    12 May 2025 07:01  -  13 May 2025 07:00  --------------------------------------------------------  IN:    Heparin: 255 mL    Oral Fluid: 360 mL  Total IN: 615 mL    OUT:    Voided (mL): 1100 mL  Total OUT: 1100 mL    Total NET: -485 mL      13 May 2025 07:01  -  13 May 2025 15:59  --------------------------------------------------------  IN:    Oral Fluid: 500 mL  Total IN: 500 mL    OUT:  Total OUT: 0 mL    Total NET: 500 mL        I&O's Summary    12 May 2025 07:01  -  13 May 2025 07:00  --------------------------------------------------------  IN: 615 mL / OUT: 1100 mL / NET: -485 mL    13 May 2025 07:01  -  13 May 2025 15:59  --------------------------------------------------------  IN: 500 mL / OUT: 0 mL / NET: 500 mL        PHYSICAL EXAM:  Neurological:    Motor exam:         [] Upper extremity              Delt        Bicep      Tricep     HG                                               R         5/5        5/5         5/5          5/5                                               L          5/5        5/5         5/5          5/5         [] Lower extremity             HF         KF          KE         PF          DF                                               R        5/5        5/5        5/5       5/5        5/5                                               L         5/5        5/5       5/5        5/5        5/5                                                        [] warm well perfused    Sensation: [] intact to light touch  [] decreased:       Cardiovascular: +s1, s2  Respiratory: clear to auscultation b/l  Gastrointestinal: soft, non-distended, non-tender  Genitourinary:   Extremities:  Incision/Wound:    TUBES/LINES:  [] CVC  [] A-line  [] Lumbar Drain  [] Ventriculostomy  [] Other    DIET:  [] NPO  [] Mechanical  [] Tube feeds    LABS:                        12.5   7.74  )-----------( 292      ( 13 May 2025 06:07 )             38.8           PT/INR - ( 12 May 2025 17:26 )   PT: 12.5 sec;   INR: 1.10 ratio         PTT - ( 13 May 2025 15:22 )  PTT:58.3 sec        CAPILLARY BLOOD GLUCOSE      POCT Blood Glucose.: 111 mg/dL (12 May 2025 20:51)  POCT Blood Glucose.: 95 mg/dL (12 May 2025 16:07)      Drug Levels: [] N/A    CSF Analysis: [] N/A      Allergies    No Known Allergies    Intolerances      MEDICATIONS:  Antibiotics:    Neuro:  acetaminophen     Tablet .. 650 milliGRAM(s) Oral every 6 hours PRN    Anticoagulation:  heparin  Infusion 1150 Unit(s)/Hr IV Continuous <Continuous>    OTHER:  atorvastatin 80 milliGRAM(s) Oral at bedtime  bisacodyl 5 milliGRAM(s) Oral every 12 hours PRN  midodrine 10 milliGRAM(s) Oral every 8 hours  polyethylene glycol 3350 17 Gram(s) Oral two times a day  senna 2 Tablet(s) Oral at bedtime    IVF:    CULTURES:    RADIOLOGY & ADDITIONAL TESTS:       Patient was seen at bedside this am. Patient felt well today. Denied any cp, sob, n/v, or abd pain. Also used  [id number] to update patient's wife and brother at bedside regarding further plan. All questions and concerns were addressed.     OVERNIGHT EVENTS: no acute event overnight    Vital Signs Last 24 Hrs  T(C): 36.7 (13 May 2025 12:00), Max: 36.8 (12 May 2025 20:41)  T(F): 98 (13 May 2025 12:00), Max: 98.3 (12 May 2025 20:41)  HR: 62 (13 May 2025 12:00) (58 - 66)  BP: 122/71 (13 May 2025 12:00) (115/72 - 124/72)  BP(mean): --  RR: 18 (13 May 2025 12:00) (18 - 18)  SpO2: 96% (13 May 2025 12:00) (95% - 99%)    Parameters below as of 13 May 2025 12:00  Patient On (Oxygen Delivery Method): room air        I&O's Detail    12 May 2025 07:01  -  13 May 2025 07:00  --------------------------------------------------------  IN:    Heparin: 255 mL    Oral Fluid: 360 mL  Total IN: 615 mL    OUT:    Voided (mL): 1100 mL  Total OUT: 1100 mL    Total NET: -485 mL      13 May 2025 07:01  -  13 May 2025 15:59  --------------------------------------------------------  IN:    Oral Fluid: 500 mL  Total IN: 500 mL    OUT:  Total OUT: 0 mL    Total NET: 500 mL        I&O's Summary    12 May 2025 07:01  -  13 May 2025 07:00  --------------------------------------------------------  IN: 615 mL / OUT: 1100 mL / NET: -485 mL    13 May 2025 07:01  -  13 May 2025 15:59  --------------------------------------------------------  IN: 500 mL / OUT: 0 mL / NET: 500 mL        PHYSICAL EXAM:  Neurological:  awake, alert, oriented to person, place, and date, PERRL, EOM intact, face symmetric, following commands, moving all extremities 5/5, sensation intact to light touch  Cardiovascular: +s1, s2  Respiratory: clear to auscultation b/l  Gastrointestinal: soft, non-distended, non-tender  Genitourinary: voiding  Extremities: warm, dry  Incision/Wound: groin access site C/D/I, no palpable hematoma noted    LABS:                        12.5   7.74  )-----------( 292      ( 13 May 2025 06:07 )             38.8           PT/INR - ( 12 May 2025 17:26 )   PT: 12.5 sec;   INR: 1.10 ratio         PTT - ( 13 May 2025 15:22 )  PTT:58.3 sec        CAPILLARY BLOOD GLUCOSE      POCT Blood Glucose.: 111 mg/dL (12 May 2025 20:51)  POCT Blood Glucose.: 95 mg/dL (12 May 2025 16:07)      Drug Levels: [] N/A    CSF Analysis: [] N/A      Allergies    No Known Allergies    Intolerances      MEDICATIONS:  Antibiotics:    Neuro:  acetaminophen     Tablet .. 650 milliGRAM(s) Oral every 6 hours PRN    Anticoagulation:  heparin  Infusion 1150 Unit(s)/Hr IV Continuous <Continuous>    OTHER:  atorvastatin 80 milliGRAM(s) Oral at bedtime  bisacodyl 5 milliGRAM(s) Oral every 12 hours PRN  midodrine 10 milliGRAM(s) Oral every 8 hours  polyethylene glycol 3350 17 Gram(s) Oral two times a day  senna 2 Tablet(s) Oral at bedtime    IVF:    CULTURES:    RADIOLOGY & ADDITIONAL TESTS:

## 2025-05-13 NOTE — CONSULT NOTE ADULT - ASSESSMENT
- SBP/ AP/ AC per stroke  - Rec [place]/ Permissive HTN/ Hep 50-70  - MRI, MRA w/ [facility]  - Further plan pending clinical course and MRIs
64 male with PMH of HTN and HLD not on AC/AP presenting to [facility] as a code stroke for left sided weakness. s/p dx angio on 5/8 confirming significant Right ICA stenosis but improving MCA clot burden, no stenting performed. On hep gtt. Transferred out of NSCU on 5/12. Medicine consulted for pre-op evaluation for stenting vs. CEA.

## 2025-05-13 NOTE — CONSULT NOTE ADULT - PROBLEM SELECTOR RECOMMENDATION 2
- patient with METS>4. no active chest pain nor dyspnea on exertion  - RCRI risk 0 pts, Class I Risk, 3.9% 30 day risk of death, MI, or cardiac arrest  - EKG sinus crissy, 1st deg AV block  - TTE with normal LVSF, no rWMA, no PFO  - patient medically optimized for procedure/OR at this time--whether it be stenting or CEA

## 2025-05-13 NOTE — CONSULT NOTE ADULT - PROBLEM SELECTOR RECOMMENDATION 3
takes lisinopril 10mg daily at home  - continue to hold lisinopril while on midodrine 28-Oct-2019 20:33

## 2025-05-13 NOTE — CONSULT NOTE ADULT - SUBJECTIVE AND OBJECTIVE BOX
BejaranoSingh  64M HTN HLD p/w Lt facial/ Lt sided weakness since 7 AM. NIHSS 3. CTH neg. CTA w/ R ICA occlusion from bifurc to ICA term/M1. CTP w/ delayed TMax in R MCA territory. No emergent intervention. Exam:  AOx3, EOMI, mild Lt central facial, BUE 5/5 w/o drift (Rt fingers amputated), BLE 5/5.     
Vivien Robles MD  Division of Hospital Medicine  [facility]   Spectra: [phone]    PCP: Dr. Yaquelin Boyle    HPI:  64 LHM PMHx HTN and HLD not on AC/AP presenting to [facility] as a code stroke for left sided weakness. LKW 7AM 5/5. Patient then felt LUE/LLE weakness. Had an episode of speech disturbance also around 9:30 AM, that resolved after unknown duration of time. Also, over the past few months, patient has had numbness in LUE/LLE coming and going. Denies HA, vision changes. Denies prior history of stroke. BP on presentation 113/77. Underwent diagnostic angio on 5/8/25 confirming signifiant R ICA stenosis but improving MCA clot burden, no stenting performed. Was started on hep gtt and midodrine in NSCU and monitored with no issues. Transferred out of NSCU on 5/12. Medicine consulted for pre-op evaluation.    Interval History: no acute events overnight. denies fever, chills, headache, dizziness, chest pain, nor dyspnea. feels well without complaints. no exertional chest pain nor dyspnea with ambulation >2 blocks nor walking up flight of stairs.    PAST MEDICAL & SURGICAL HISTORY:  HTN  HLD  Vit D deficiency    Home Medications reviewed [x]:  atorvastatin 10 mg oral tablet: 1 tab(s) orally once a day (05 May 2025 15:56)  lisinopril 10 mg oral tablet: 1 tab(s) orally once a day (05 May 2025 15:56)  Vitamin D2 1.25 mg (50,000 intl units) oral capsule: 1 cap(s) orally once a week (05 May 2025 15:56)      Review of Systems:   CONSTITUTIONAL: No fever, chills  HEENT: No sore throat, vision changes  RESPIRATORY: No cough, wheezing, shortness of breath  CARDIOVASCULAR: No chest pain, palpitations, leg edema  GASTROINTESTINAL: No abdominal pain, nausea, vomiting, diarrhea or constipation. No melena or hematochezia.  GENITOURINARY: No dysuria, frequency, hematuria  NEUROLOGICAL: No headaches, memory loss, loss of strength, numbness, or tremors  SKIN: No itching, burning, rashes, or lesions   MUSCULOSKELETAL: No joint pain or swelling; No muscle, back, or extremity pain  PSYCHIATRIC: No depression, anxiety  HEME/LYMPH: No easy bruising, or bleeding gums      Allergies  No Known Allergies    Intolerances    Social History:   Denies smoking  Social EtOH use  retired [employer]     FAMILY HISTORY:  Mother: diabetes      MEDICATIONS  (STANDING):  atorvastatin 80 milliGRAM(s) Oral at bedtime  heparin  Infusion 1150 Unit(s)/Hr (13.5 mL/Hr) IV Continuous <Continuous>  midodrine 10 milliGRAM(s) Oral every 8 hours  polyethylene glycol 3350 17 Gram(s) Oral two times a day  senna 2 Tablet(s) Oral at bedtime    MEDICATIONS  (PRN):  acetaminophen     Tablet .. 650 milliGRAM(s) Oral every 6 hours PRN Temp greater or equal to 38C (100.4F), Mild Pain (1 - 3)  bisacodyl 5 milliGRAM(s) Oral every 12 hours PRN Constipation        CAPILLARY BLOOD GLUCOSE      POCT Blood Glucose.: 111 mg/dL (12 May 2025 20:51)  POCT Blood Glucose.: 95 mg/dL (12 May 2025 16:07)    I&O's Summary    12 May 2025 07:01  -  13 May 2025 07:00  --------------------------------------------------------  IN: 615 mL / OUT: 1100 mL / NET: -485 mL    13 May 2025 07:01  -  13 May 2025 14:21  --------------------------------------------------------  IN: 500 mL / OUT: 0 mL / NET: 500 mL        Physical Exam:  Vital Signs Last 24 Hrs  T(C): 36.7 (13 May 2025 12:00), Max: 36.8 (12 May 2025 20:41)  T(F): 98 (13 May 2025 12:00), Max: 98.3 (12 May 2025 20:41)  HR: 62 (13 May 2025 12:00) (58 - 66)  BP: 122/71 (13 May 2025 12:00) (115/72 - 124/72)  BP(mean): --  RR: 18 (13 May 2025 12:00) (18 - 18)  SpO2: 96% (13 May 2025 12:00) (95% - 99%)    Parameters below as of 13 May 2025 12:00  Patient On (Oxygen Delivery Method): room air    CONSTITUTIONAL: NAD, well-developed, well-groomed  EYES: PERRLA; conjunctiva and sclera clear  ENMT: Moist oral mucosa, no pharyngeal injection or exudates; normal dentition  NECK: Supple, no palpable masses; no thyromegaly  RESPIRATORY: Normal respiratory effort; lungs are clear to auscultation bilaterally  CARDIOVASCULAR: Regular rate and rhythm, normal S1 and S2, no murmur/rub/gallop; No lower extremity edema  ABDOMEN: Soft, Nondistended, Nontender to palpation, normoactive bowel sounds  MUSCULOSKELETAL: No clubbing or cyanosis of digits; +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  PSYCH: A+O to person, place, and time; affect appropriate  NEUROLOGY: CN 2-12 are intact and symmetric; no gross sensory deficits, 5/5 strength throughout  SKIN: No rashes; no palpable lesions    LABS:                        12.5   7.74  )-----------( 292      ( 13 May 2025 06:07 )             38.8       PT/INR - ( 12 May 2025 17:26 )   PT: 12.5 sec;   INR: 1.10 ratio         PTT - ( 13 May 2025 06:07 )  PTT:72.7 sec      RADIOLOGY & ADDITIONAL TESTS:  Results Reviewed: no leukocytosis, H/H stable  Imaging Personally Reviewed:  5/11/25 CT Head:  FINDINGS: Evolving acute/subacute small infarcts are again seen within   the right MCA distribution, more optimally assessed on MR modality. There   is no gross hemorrhagic transformation.    Additional chronic lacunar infarct is seen within the right basal   ganglia. Ex vacuo dilatation of the right lateral ventricle is seen.    There is no acute intracranial hemorrhage, shift of the fissures,   herniation, or hydrocephalus.    The paranasal sinuses and tympanomastoid cavities are clear. The   calvarium appears intact. The orbits appear unremarkable.    IMPRESSION: Small scattered evolving acute/subacute right-sided MCA   distribution infarcts are again seen, and were more optimally assessed on   MR modality. No hemorrhagic transformation.    Multiple additional unchanged chronic intracranial findings, as discussed.    TTE:  CONCLUSIONS:      1. Left ventricular cavity is normal in size. Left ventricular wall thickness is normal. Left ventricular systolic function is normal. There are no regional wall motion abnormalities seen.   2. Normal left ventricular diastolic function, with normal left ventricular filling pressure.   3. Normal right ventricular cavity size and normal right ventricular systolic function.   4. Agitated saline injection was negative for intracardiac shunt.   5. No significant valvular disease.   6. No pericardial effusion seen.   7. No prior echocardiogram is available for comparison.      Electrocardiogram Personally Reviewed: sinus crissy, 1st deg AVB    COORDINATION OF CARE:  Care Discussed with Consultants/Other Providers [Y]: Neurosurgery [facility] [place]  Prior or Outpatient Records Reviewed [Y/N]:

## 2025-05-13 NOTE — CONSULT NOTE ADULT - PROBLEM SELECTOR RECOMMENDATION 9
- s/p dx angio on 5/8 confirming significant Right ICA stenosis but improving MCA clot burden  - TTE with no PFO  - c/w Atorvastatin 80mg qHS  - c/w hep gtt as per NSGY  - c/w midodrine 10mg TID   - plan for stenting vs. CEA - final plan TBD

## 2025-05-14 LAB
ANION GAP SERPL CALC-SCNC: 14 MMOL/L — SIGNIFICANT CHANGE UP (ref 5–17)
APTT BLD: 48 SEC — HIGH (ref 26.1–36.8)
APTT BLD: 61.8 SEC — HIGH (ref 26.1–36.8)
APTT BLD: 64.9 SEC — HIGH (ref 26.1–36.8)
APTT BLD: 71.4 SEC — HIGH (ref 26.1–36.8)
BUN SERPL-MCNC: 15 MG/DL — SIGNIFICANT CHANGE UP (ref 7–23)
CALCIUM SERPL-MCNC: 9.3 MG/DL — SIGNIFICANT CHANGE UP (ref 8.4–10.5)
CHLORIDE SERPL-SCNC: 104 MMOL/L — SIGNIFICANT CHANGE UP (ref 96–108)
CO2 SERPL-SCNC: 21 MMOL/L — LOW (ref 22–31)
CREAT SERPL-MCNC: 0.86 MG/DL — SIGNIFICANT CHANGE UP (ref 0.5–1.3)
EGFR: 96 ML/MIN/1.73M2 — SIGNIFICANT CHANGE UP
EGFR: 96 ML/MIN/1.73M2 — SIGNIFICANT CHANGE UP
GLUCOSE SERPL-MCNC: 83 MG/DL — SIGNIFICANT CHANGE UP (ref 70–99)
HCT VFR BLD CALC: 41 % — SIGNIFICANT CHANGE UP (ref 39–50)
HGB BLD-MCNC: 13.1 G/DL — SIGNIFICANT CHANGE UP (ref 13–17)
MCHC RBC-ENTMCNC: 29 PG — SIGNIFICANT CHANGE UP (ref 27–34)
MCHC RBC-ENTMCNC: 32 G/DL — SIGNIFICANT CHANGE UP (ref 32–36)
MCV RBC AUTO: 90.7 FL — SIGNIFICANT CHANGE UP (ref 80–100)
NRBC BLD AUTO-RTO: 0 /100 WBCS — SIGNIFICANT CHANGE UP (ref 0–0)
PLATELET # BLD AUTO: 346 K/UL — SIGNIFICANT CHANGE UP (ref 150–400)
POTASSIUM SERPL-MCNC: 4.3 MMOL/L — SIGNIFICANT CHANGE UP (ref 3.5–5.3)
POTASSIUM SERPL-SCNC: 4.3 MMOL/L — SIGNIFICANT CHANGE UP (ref 3.5–5.3)
RBC # BLD: 4.52 M/UL — SIGNIFICANT CHANGE UP (ref 4.2–5.8)
RBC # FLD: 13.2 % — SIGNIFICANT CHANGE UP (ref 10.3–14.5)
SODIUM SERPL-SCNC: 139 MMOL/L — SIGNIFICANT CHANGE UP (ref 135–145)
WBC # BLD: 7.8 K/UL — SIGNIFICANT CHANGE UP (ref 3.8–10.5)
WBC # FLD AUTO: 7.8 K/UL — SIGNIFICANT CHANGE UP (ref 3.8–10.5)

## 2025-05-14 RX ORDER — MAGNESIUM CITRATE
296 SOLUTION, ORAL ORAL ONCE
Refills: 0 | Status: COMPLETED | OUTPATIENT
Start: 2025-05-14 | End: 2025-05-14

## 2025-05-14 RX ORDER — BISACODYL 5 MG
10 TABLET, DELAYED RELEASE (ENTERIC COATED) ORAL ONCE
Refills: 0 | Status: DISCONTINUED | OUTPATIENT
Start: 2025-05-14 | End: 2025-05-17

## 2025-05-14 RX ADMIN — MIDODRINE HYDROCHLORIDE 10 MILLIGRAM(S): 5 TABLET ORAL at 05:00

## 2025-05-14 RX ADMIN — Medication 2 TABLET(S): at 22:11

## 2025-05-14 RX ADMIN — MIDODRINE HYDROCHLORIDE 10 MILLIGRAM(S): 5 TABLET ORAL at 13:07

## 2025-05-14 RX ADMIN — Medication 5 MILLIGRAM(S): at 05:02

## 2025-05-14 RX ADMIN — MIDODRINE HYDROCHLORIDE 10 MILLIGRAM(S): 5 TABLET ORAL at 22:30

## 2025-05-14 RX ADMIN — POLYETHYLENE GLYCOL 3350 17 GRAM(S): 17 POWDER, FOR SOLUTION ORAL at 17:10

## 2025-05-14 RX ADMIN — ATORVASTATIN CALCIUM 80 MILLIGRAM(S): 80 TABLET, FILM COATED ORAL at 22:12

## 2025-05-14 RX ADMIN — POLYETHYLENE GLYCOL 3350 17 GRAM(S): 17 POWDER, FOR SOLUTION ORAL at 05:01

## 2025-05-14 RX ADMIN — Medication 296 MILLILITER(S): at 14:12

## 2025-05-14 NOTE — PROGRESS NOTE ADULT - ASSESSMENT
64M, PMHx HTN and HLD not on AC/AP presenting to [facility] as a code stroke for left sided weakness. LKW 7AM 5/5. Patient then felt LUE/LLE weakness. Had an episode of speech disturbance also around 9:30 AM, that resolved after unknown duration of time. Also, over the past few months, patient has had numbness in LUE/LLE coming and going. Denies HA, vision changes.    ICU COURSE:  5/5: admitted to NSCU for hemodynamic monitoring to see if neurological exam is pressure dependent.  5/8: s/p diagnostic angio confirming significant Right ICA stenosis but improving MCA clot burden, no stenting performed.  5/9: SBP liberalized to 110-160 from 120-160.  5/10: SBP liberalized to 100-160 from 110-160.  5/12 downgraded to neurosurgery floor  5/13 hospitalist consulted and cleared for further procedure/OR    PLAN:  - neuro and vital check Q4hrs  - continue heparin gtt with PTT goal 50-70 for RMCA clot, PTT this am 48, repeat PTT 64.9, will f/u PTT Q6hrs  - plan to repeat CTA H/N next week to assess clot burden, pending CEA vs angio/stent for OSVALDO stenosis  - pain control with tylenol prn  - SBP 100s to 120s, continue midodrine 10mg Q8hrs  - HLD, continue lipitor  - tolerating CCD diet  - last BM on 5/10, mg citrate added for bowel regimens  - activity OOB as tolerated  - incentive spirometer  - DVT ppx: b/l SCDs and heparin gtt  Discharge planning: PT/OT- outpatient PT    plan to be discussed with Dr. Julia stewart [id number]   64M, PMHx HTN and HLD not on AC/AP presenting to [facility] as a code stroke for left sided weakness. LKW 7AM 5/5. Patient then felt LUE/LLE weakness. Had an episode of speech disturbance also around 9:30 AM, that resolved after unknown duration of time. Also, over the past few months, patient has had numbness in LUE/LLE coming and going. Denies HA, vision changes.    ICU COURSE:  5/5: admitted to NSCU for hemodynamic monitoring to see if neurological exam is pressure dependent.  5/8: s/p diagnostic angio confirming significant Right ICA stenosis but improving MCA clot burden, no stenting performed.  5/9: SBP liberalized to 110-160 from 120-160.  5/10: SBP liberalized to 100-160 from 110-160.  5/12 downgraded to neurosurgery floor  5/13 hospitalist consulted and cleared for further procedure/OR    PLAN:  - neuro and vital check Q4hrs  - continue heparin gtt with PTT goal 50-70 for RMCA clot, PTT this am 48, repeat PTT 64.9, will f/u PTT Q6hrs  - plan to repeat CTA H/N next week to assess clot burden, pending CEA vs angio/stent for OSVALDO stenosis  - pain control with tylenol prn  - SBP 100s to 120s, continue midodrine 10mg Q8hrs  - HLD, continue lipitor  - tolerating CCD diet  - last BM on 5/10, mg citrate added for bowel regimens  - activity OOB as tolerated  - incentive spirometer  - DVT ppx: b/l SCDs and heparin gtt  Discharge planning: PT/OT- outpatient PT    plan to be discussed with Dr. Jarvis stewart [phone]

## 2025-05-14 NOTE — PROGRESS NOTE ADULT - SUBJECTIVE AND OBJECTIVE BOX
[id number]  Patient was seen at bedside this am. Patient did not have a decent bowel movement yet. Patient has issues with bowel movement at home too. Able to pass gas. Denied any cp, sob, n/v, or abd pain.    OVERNIGHT EVENTS: no acute event overnight    Vital Signs Last 24 Hrs  T(C): 36.6 (14 May 2025 16:20), Max: 37.2 (13 May 2025 20:49)  T(F): 97.9 (14 May 2025 16:20), Max: 98.9 (13 May 2025 20:49)  HR: 76 (14 May 2025 16:20) (56 - 93)  BP: 117/72 (14 May 2025 16:20) (104/65 - 137/82)  BP(mean): --  RR: 18 (14 May 2025 16:20) (18 - 18)  SpO2: 97% (14 May 2025 16:20) (94% - 97%)    Parameters below as of 14 May 2025 16:20  Patient On (Oxygen Delivery Method): room air        I&O's Detail    13 May 2025 07:01  -  14 May 2025 07:00  --------------------------------------------------------  IN:    Oral Fluid: 1280 mL  Total IN: 1280 mL    OUT:    Voided (mL): 600 mL  Total OUT: 600 mL    Total NET: 680 mL      14 May 2025 07:01  -  14 May 2025 16:57  --------------------------------------------------------  IN:    Oral Fluid: 360 mL  Total IN: 360 mL    OUT:    Voided (mL): 200 mL  Total OUT: 200 mL    Total NET: 160 mL        I&O's Summary    13 May 2025 07:01  -  14 May 2025 07:00  --------------------------------------------------------  IN: 1280 mL / OUT: 600 mL / NET: 680 mL    14 May 2025 07:01  -  14 May 2025 16:57  --------------------------------------------------------  IN: 360 mL / OUT: 200 mL / NET: 160 mL        PHYSICAL EXAM:  Neurological:  awake, alert, oriented to person, place, and date, face symmetric, following commands, moving all extremities 5/5, sensation intact to light touch  Cardiovascular: +s1, s2  Respiratory: clear to auscultation b/l  Gastrointestinal: mildly distended, but soft, non-tender  Genitourinary: voiding  Extremities: warm, dry  Incision/Wound: right groin access site C/D/I, no palpable hematoma        LABS:                        13.1   7.80  )-----------( 346      ( 14 May 2025 06:58 )             41.0     05-14    139  |  104  |  15  ----------------------------<  83  4.3   |  21[L]  |  0.86    Ca    9.3      14 May 2025 06:57      PT/INR - ( 12 May 2025 17:26 )   PT: 12.5 sec;   INR: 1.10 ratio         PTT - ( 14 May 2025 14:21 )  PTT:64.9 sec  Urinalysis Basic - ( 14 May 2025 06:57 )    Color: x / Appearance: x / SG: x / pH: x  Gluc: 83 mg/dL / Ketone: x  / Bili: x / Urobili: x   Blood: x / Protein: x / Nitrite: x   Leuk Esterase: x / RBC: x / WBC x   Sq Epi: x / Non Sq Epi: x / Bacteria: x          CAPILLARY BLOOD GLUCOSE          Drug Levels: [] N/A    CSF Analysis: [] N/A      Allergies    No Known Allergies    Intolerances      MEDICATIONS:  Antibiotics:    Neuro:  acetaminophen     Tablet .. 650 milliGRAM(s) Oral every 6 hours PRN    Anticoagulation:  heparin  Infusion 1150 Unit(s)/Hr IV Continuous <Continuous>    OTHER:  atorvastatin 80 milliGRAM(s) Oral at bedtime  bisacodyl Suppository 10 milliGRAM(s) Rectal once  midodrine 10 milliGRAM(s) Oral every 8 hours  polyethylene glycol 3350 17 Gram(s) Oral two times a day  senna 2 Tablet(s) Oral at bedtime    IVF:    CULTURES:    RADIOLOGY & ADDITIONAL TESTS:

## 2025-05-15 LAB — APTT BLD: 60.2 SEC — HIGH (ref 26.1–36.8)

## 2025-05-15 PROCEDURE — 99232 SBSQ HOSP IP/OBS MODERATE 35: CPT

## 2025-05-15 RX ADMIN — Medication 2 TABLET(S): at 23:34

## 2025-05-15 RX ADMIN — MIDODRINE HYDROCHLORIDE 10 MILLIGRAM(S): 5 TABLET ORAL at 23:34

## 2025-05-15 RX ADMIN — POLYETHYLENE GLYCOL 3350 17 GRAM(S): 17 POWDER, FOR SOLUTION ORAL at 17:20

## 2025-05-15 RX ADMIN — MIDODRINE HYDROCHLORIDE 10 MILLIGRAM(S): 5 TABLET ORAL at 05:05

## 2025-05-15 RX ADMIN — ATORVASTATIN CALCIUM 80 MILLIGRAM(S): 80 TABLET, FILM COATED ORAL at 23:34

## 2025-05-15 RX ADMIN — MIDODRINE HYDROCHLORIDE 10 MILLIGRAM(S): 5 TABLET ORAL at 13:12

## 2025-05-15 RX ADMIN — POLYETHYLENE GLYCOL 3350 17 GRAM(S): 17 POWDER, FOR SOLUTION ORAL at 05:04

## 2025-05-15 NOTE — PROGRESS NOTE ADULT - SUBJECTIVE AND OBJECTIVE BOX
SUBJECTIVE: HPI:  64 LHM PMHx HTN and HLD not on AC/AP presenting to [facility] as a code stroke for left sided weakness. LKW 7AM 5/5. Patient then felt LUE/LLE weakness. Had an episode of speech disturbance also around 9:30 AM, that resolved after unknown duration of time. Also, over the past few months, patient has had numbness in LUE/LLE coming and going. Denies HA, vision changes. Denies prior history of stroke. BP on presentation 113/77    NIHSS:4  preMRS:0    Not a tenecteplase candidate outside of the time window.   Not a thrombectomy candidate given likely chronic occlusion per neurointerventional team  (05 May 2025 15:08)      OVERNIGHT EVENTS: No acute events overnight, patient seen and evaluated at bedside, remains on Heparin drip, currently therapeutic. No complaints.     Vital Signs Last 24 Hrs  T(C): 36.8 (15 May 2025 13:10), Max: 37.2 (14 May 2025 19:45)  T(F): 98.3 (15 May 2025 13:10), Max: 98.9 (14 May 2025 19:45)  HR: 67 (15 May 2025 13:10) (54 - 76)  BP: 118/74 (15 May 2025 13:10) (114/72 - 145/81)  BP(mean): --  RR: 18 (15 May 2025 13:10) (18 - 18)  SpO2: 96% (15 May 2025 13:10) (95% - 98%)    Parameters below as of 15 May 2025 05:00  Patient On (Oxygen Delivery Method): room air        PHYSICAL EXAM:    General: No Acute Distress     Neurological: Awake, Ox3 (name, place, date), pupils 3mm react b/l, EOMI, following commands, no drift, uppers 5/5, lowers 5/5, SILT    Pulmonary: Clear to Auscultation, No Rales, No Rhonchi, No Wheezes     Cardiovascular: S1, S2, Regular Rate and Rhythm     Gastrointestinal: Soft, Nontender, Nondistended     Incision: right groin puncture C/D/I, soft    LABS:                        13.1   7.80  )-----------( 346      ( 14 May 2025 06:58 )             41.0    05-14    139  |  104  |  15  ----------------------------<  83  4.3   |  21[L]  |  0.86    Ca    9.3      14 May 2025 06:57    PTT - ( 15 May 2025 06:13 )  PTT:60.2 sec      05-14 @ 07:01  -  05-15 @ 07:00  --------------------------------------------------------  IN: 360 mL / OUT: 200 mL / NET: 160 mL    05-15 @ 07:01  -  05-15 @ 15:10  --------------------------------------------------------  IN: 360 mL / OUT: 800 mL / NET: -440 mL      DRAINS: None    MEDICATIONS:  Antibiotics:    Neuro:  acetaminophen     Tablet .. 650 milliGRAM(s) Oral every 6 hours PRN Temp greater or equal to 38C (100.4F), Mild Pain (1 - 3)    Cardiac:  midodrine 10 milliGRAM(s) Oral every 8 hours    Pulm:    GI/:  bisacodyl Suppository 10 milliGRAM(s) Rectal once  polyethylene glycol 3350 17 Gram(s) Oral two times a day  senna 2 Tablet(s) Oral at bedtime    Other:   atorvastatin 80 milliGRAM(s) Oral at bedtime  heparin  Infusion 1150 Unit(s)/Hr IV Continuous <Continuous>    DIET: [] Regular [x] CCD [] Renal [] Puree [] Dysphagia [] Tube Feeds:     IMAGING:   < from: CT Head No Cont (05.11.25 @ 09:08) >  IMPRESSION: Small scattered evolving acute/subacute right-sided MCA   distribution infarcts are again seen, and were more optimally assessed on   MR modality. No hemorrhagic transformation.    Multiple additional unchanged chronic intracranial findings, as discussed.      --- End of Report ---      SAMPSON AGUERO MD; Attending Radiologist  This document has been electronically signed. May 11 2025  9:16AM    < end of copied text >    < from: MR Angio Head No Cont (05.06.25 @ 13:06) >  IMPRESSION:    1.  RIGHT CAROTID NECK CIRCULATION:   Right internal carotid arterial   occlusion. T1 hyperintensity within the carotid bulb may reflect   intramural hematoma of arterial dissection    2.  LEFT CAROTID NECK CIRCULATION:    Atherosclerotic plaque without   hemodynamically significant stenosis.    3.  VERTEBRAL NECK CIRCULATION:   Intact    4.  ANTERIOR INTRACRANIAL CIRCULATION:     Right internal carotid   arterial occlusion. Right middle cerebral arterial occlusion. The right   anterior cerebral artery is perfused via left-to-right anterior   collateral circulation via patent anterior communicating artery. Left   internal carotid clinoid segment focal stenosis, moderate    5.  POSTERIOR INTRACRANIAL CIRCULATION:   Intracranial atherosclerosis   each vertebral artery, severe, in each posterior cerebral artery, mild to   moderate on the right and moderate to severe on the left.    6.  BRAIN:    Scattered foci of acute infarction within the right   cerebral hemisphere have anterior to posterior parasagittal distribution   suggesting low-flow/watershed mechanism. The largest component is within   the right posterior putamen, bordering remote hemorrhagic infarction   within the anterior putamen. Additional small cortical foci are noted in   the right parietal lobe and subcortical foci within the right frontal and   parietal lobes.    --- End of Report ---      JUAN PADILLA MD; Attending Radiologist  This document has beenelectronically signed. May  6 2025  1:40PM    < end of copied text >

## 2025-05-16 LAB
ANION GAP SERPL CALC-SCNC: 13 MMOL/L — SIGNIFICANT CHANGE UP (ref 5–17)
APTT BLD: 48 SEC — HIGH (ref 26.1–36.8)
APTT BLD: 54.1 SEC — HIGH (ref 26.1–36.8)
APTT BLD: 61 SEC — HIGH (ref 26.1–36.8)
BUN SERPL-MCNC: 17 MG/DL — SIGNIFICANT CHANGE UP (ref 7–23)
CALCIUM SERPL-MCNC: 9.7 MG/DL — SIGNIFICANT CHANGE UP (ref 8.4–10.5)
CHLORIDE SERPL-SCNC: 102 MMOL/L — SIGNIFICANT CHANGE UP (ref 96–108)
CO2 SERPL-SCNC: 23 MMOL/L — SIGNIFICANT CHANGE UP (ref 22–31)
CREAT SERPL-MCNC: 0.85 MG/DL — SIGNIFICANT CHANGE UP (ref 0.5–1.3)
EGFR: 96 ML/MIN/1.73M2 — SIGNIFICANT CHANGE UP
EGFR: 96 ML/MIN/1.73M2 — SIGNIFICANT CHANGE UP
GLUCOSE SERPL-MCNC: 84 MG/DL — SIGNIFICANT CHANGE UP (ref 70–99)
HCT VFR BLD CALC: 41.6 % — SIGNIFICANT CHANGE UP (ref 39–50)
HGB BLD-MCNC: 13.3 G/DL — SIGNIFICANT CHANGE UP (ref 13–17)
MCHC RBC-ENTMCNC: 29 PG — SIGNIFICANT CHANGE UP (ref 27–34)
MCHC RBC-ENTMCNC: 32 G/DL — SIGNIFICANT CHANGE UP (ref 32–36)
MCV RBC AUTO: 90.8 FL — SIGNIFICANT CHANGE UP (ref 80–100)
NRBC BLD AUTO-RTO: 0 /100 WBCS — SIGNIFICANT CHANGE UP (ref 0–0)
PLATELET # BLD AUTO: 374 K/UL — SIGNIFICANT CHANGE UP (ref 150–400)
POTASSIUM SERPL-MCNC: 4.5 MMOL/L — SIGNIFICANT CHANGE UP (ref 3.5–5.3)
POTASSIUM SERPL-SCNC: 4.5 MMOL/L — SIGNIFICANT CHANGE UP (ref 3.5–5.3)
RBC # BLD: 4.58 M/UL — SIGNIFICANT CHANGE UP (ref 4.2–5.8)
RBC # FLD: 13.2 % — SIGNIFICANT CHANGE UP (ref 10.3–14.5)
SODIUM SERPL-SCNC: 138 MMOL/L — SIGNIFICANT CHANGE UP (ref 135–145)
WBC # BLD: 7.83 K/UL — SIGNIFICANT CHANGE UP (ref 3.8–10.5)
WBC # FLD AUTO: 7.83 K/UL — SIGNIFICANT CHANGE UP (ref 3.8–10.5)

## 2025-05-16 PROCEDURE — 99232 SBSQ HOSP IP/OBS MODERATE 35: CPT

## 2025-05-16 RX ADMIN — Medication 2 TABLET(S): at 22:24

## 2025-05-16 RX ADMIN — POLYETHYLENE GLYCOL 3350 17 GRAM(S): 17 POWDER, FOR SOLUTION ORAL at 16:43

## 2025-05-16 RX ADMIN — MIDODRINE HYDROCHLORIDE 10 MILLIGRAM(S): 5 TABLET ORAL at 13:57

## 2025-05-16 RX ADMIN — POLYETHYLENE GLYCOL 3350 17 GRAM(S): 17 POWDER, FOR SOLUTION ORAL at 06:41

## 2025-05-16 RX ADMIN — ATORVASTATIN CALCIUM 80 MILLIGRAM(S): 80 TABLET, FILM COATED ORAL at 22:24

## 2025-05-16 RX ADMIN — MIDODRINE HYDROCHLORIDE 10 MILLIGRAM(S): 5 TABLET ORAL at 22:24

## 2025-05-16 RX ADMIN — MIDODRINE HYDROCHLORIDE 10 MILLIGRAM(S): 5 TABLET ORAL at 06:41

## 2025-05-16 NOTE — PROGRESS NOTE ADULT - ASSESSMENT
ASSESSMENT AND PLAN: 64 LHM PMHx HTN and HLD not on AC/AP presenting to [facility] as a code stroke for left sided weakness. LKW 7AM 5/5. Patient then felt LUE/LLE weakness. Had an episode of speech disturbance also around 9:30 AM, that resolved after unknown duration of time. Also, over the past few months, patient has had numbness in LUE/LLE coming and going. Denies HA, vision changes. Denies prior history of stroke. BP on presentation 113/77    HOSPITAL COURSE:  5/5: admitted to NSCU for hemodynamic monitoring to see if neurological exam is pressure dependent.  5/8: s/p diagnostic angio confirming significant Right ICA stenosis but improving MCA clot burden, no stenting performed.  5/9: SBP liberalized to 110-160 from 120-160.  5/10: SBP liberalized to 100-160 from 110-160.  5/12: downgraded to neurosurgery floor  5/13: hospitalist consulted and cleared for further procedure/OR  5/15: Remains on Heparin drip, therapeutic  5/16: Subtherapeutic on Heparin AM PTT 48, will repeat in 6hours    NEURO:   - Continue stroke neuro checks q 4  - Continue Heparin drip for RMCA clot, PTT goal 50-70, currently subtherapeutic 48 this morning, will repeat in 6hours  - Repeat CTA H/N on Monday 5/19, case to be discussed on Vascular Conference that day to determine CEA vs stent (Hospitalist cleared medically for either procedure, documented on 5/13)  - Pain control w/ Tylenol prn  - Stroke Neurology peripherally following patient  - Activity increase as tolerated    PULM:   - On room air, O2Sat>96%  - Incentive spirometry    CV:  - -160 (within 110-140s)  - Continue Midodrine 10mg TID  - Continue Lipitor for HLD  - 5/6 TTE: LV systolic fxn is normal, LV diastolic fxn normal, normal RV systolic fxn, no PFO, no pericardial effusion    ENDO:   - A1c 5.8, goal euglycemia    HEME/ONC:             5/16 CBC stable         DVT ppx: Heparin drip, SCDs, 5/5 LED negative    RENAL:   - IVL  - 5/16 BMP stable  - Voiding     ID:   - Afebrile    GI:    - Continue oral diet  - Senna and Miralax, last BM 5/14    More than 30 minutes were spent going over patient's condition, medications, follow up plans, signs and symptoms to be concerned with, preparing paperwork, and questions answered regarding current disposition.     DISCHARGE PLANNING:   PT: outpatient PT, no skilled OT needs; if patient undergoes procedure will need to be re-eval after    Plan to be discussed w/ Dr. Dumont  [id number]    ASSESSMENT AND PLAN: 64 LHM PMHx HTN and HLD not on AC/AP presenting to [facility] as a code stroke for left sided weakness. LKW 7AM 5/5. Patient then felt LUE/LLE weakness. Had an episode of speech disturbance also around 9:30 AM, that resolved after unknown duration of time. Also, over the past few months, patient has had numbness in LUE/LLE coming and going. Denies HA, vision changes. Denies prior history of stroke. BP on presentation 113/77    HOSPITAL COURSE:  5/5: admitted to NSCU for hemodynamic monitoring to see if neurological exam is pressure dependent.  5/8: s/p diagnostic angio confirming significant Right ICA stenosis but improving MCA clot burden, no stenting performed.  5/9: SBP liberalized to 110-160 from 120-160.  5/10: SBP liberalized to 100-160 from 110-160.  5/12: downgraded to neurosurgery floor  5/13: hospitalist consulted and cleared for further procedure/OR  5/15: Remains on Heparin drip, therapeutic  5/16: Subtherapeutic on Heparin AM PTT 48, will repeat in 6hours    NEURO:   - Continue stroke neuro checks q 4  - Continue Heparin drip for RMCA clot, PTT goal 50-70, currently subtherapeutic 48 this morning, will repeat in 6hours  - Repeat CTA H/N on Monday 5/19, case to be discussed on Vascular Conference that day to determine CEA vs stent (Hospitalist cleared medically for either procedure, documented on 5/13)  - Pain control w/ Tylenol prn  - Stroke Neurology peripherally following patient  - Activity increase as tolerated    PULM:   - On room air, O2Sat>96%  - Incentive spirometry    CV:  - -160 (within 110-140s)  - Continue Midodrine 10mg TID  - Continue Lipitor for HLD  - 5/6 TTE: LV systolic fxn is normal, LV diastolic fxn normal, normal RV systolic fxn, no PFO, no pericardial effusion    ENDO:   - A1c 5.8, goal euglycemia    HEME/ONC:             5/16 CBC stable         DVT ppx: Heparin drip, SCDs, 5/5 LED negative    RENAL:   - IVL  - 5/16 BMP stable  - Voiding     ID:   - Afebrile    GI:    - Continue oral diet  - Senna and Miralax, last BM 5/14    More than 30 minutes were spent going over patient's condition, medications, follow up plans, signs and symptoms to be concerned with, preparing paperwork, and questions answered regarding current disposition.     Updated his brother at bedside this morning, answered questions.     DISCHARGE PLANNING:   PT: outpatient PT, no skilled OT needs; if patient undergoes procedure will need to be re-eval after    Plan to be discussed w/ Dr. Dumont  [id number]    ASSESSMENT AND PLAN: 64 LHM PMHx HTN and HLD not on AC/AP presenting to [facility] as a code stroke for left sided weakness. LKW 7AM 5/5. Patient then felt LUE/LLE weakness. Had an episode of speech disturbance also around 9:30 AM, that resolved after unknown duration of time. Also, over the past few months, patient has had numbness in LUE/LLE coming and going. Denies HA, vision changes. Denies prior history of stroke. BP on presentation 113/77    HOSPITAL COURSE:  5/5: admitted to NSCU for hemodynamic monitoring to see if neurological exam is pressure dependent.  5/8: s/p diagnostic angio confirming significant Right ICA stenosis but improving MCA clot burden, no stenting performed.  5/9: SBP liberalized to 110-160 from 120-160.  5/10: SBP liberalized to 100-160 from 110-160.  5/12: downgraded to neurosurgery floor  5/13: hospitalist consulted and cleared for further procedure/OR  5/15: Remains on Heparin drip, therapeutic  5/16: Subtherapeutic on Heparin AM PTT 48, will repeat in 6hours    NEURO:   - Continue stroke neuro checks q 4  - Continue Heparin drip for RMCA clot, PTT goal 50-70, currently subtherapeutic 48 this morning, repeat in 6hours is 54.1 will do q 6 PTT for now  - Repeat CTA H/N on Monday 5/19, case to be discussed on Vascular Conference that day to determine CEA vs stent (Hospitalist cleared medically for either procedure, documented on 5/13)  - Pain control w/ Tylenol prn  - Stroke Neurology peripherally following patient  - Activity increase as tolerated    PULM:   - On room air, O2Sat>96%  - Incentive spirometry    CV:  - -160 (within 110-140s)  - Continue Midodrine 10mg TID  - Continue Lipitor for HLD  - 5/6 TTE: LV systolic fxn is normal, LV diastolic fxn normal, normal RV systolic fxn, no PFO, no pericardial effusion    ENDO:   - A1c 5.8, goal euglycemia    HEME/ONC:             5/16 CBC stable         DVT ppx: Heparin drip, SCDs, 5/5 LED negative    RENAL:   - IVL  - 5/16 BMP stable  - Voiding     ID:   - Afebrile    GI:    - Continue oral diet  - Senna and Miralax, last BM 5/14    More than 30 minutes were spent going over patient's condition, medications, follow up plans, signs and symptoms to be concerned with, preparing paperwork, and questions answered regarding current disposition.     Updated his brother at bedside this morning, answered questions.     DISCHARGE PLANNING:   PT: outpatient PT, no skilled OT needs; if patient undergoes procedure will need to be re-eval after    Plan to be discussed w/ Dr. Dumont  [phone]

## 2025-05-16 NOTE — PROGRESS NOTE ADULT - SUBJECTIVE AND OBJECTIVE BOX
SUBJECTIVE: HPI:  64 LHM PMHx HTN and HLD not on AC/AP presenting to [facility] as a code stroke for left sided weakness. LKW 7AM 5/5. Patient then felt LUE/LLE weakness. Had an episode of speech disturbance also around 9:30 AM, that resolved after unknown duration of time. Also, over the past few months, patient has had numbness in LUE/LLE coming and going. Denies HA, vision changes. Denies prior history of stroke. BP on presentation 113/77    NIHSS:4  preMRS:0    Not a tenecteplase candidate outside of the time window.   Not a thrombectomy candidate given likely chronic occlusion per neurointerventional team  (05 May 2025 15:08)      OVERNIGHT EVENTS: No acute events overnight, patient seen and evaluated at bedside doing well. No complaints, remains on Heparin drip.     Vital Signs Last 24 Hrs  T(C): 36.8 (16 May 2025 04:24), Max: 37.2 (15 May 2025 17:21)  T(F): 98.2 (16 May 2025 04:24), Max: 99 (15 May 2025 17:21)  HR: 96 (16 May 2025 04:24) (67 - 100)  BP: 121/74 (16 May 2025 04:24) (116/75 - 144/95)  BP(mean): --  RR: 18 (16 May 2025 04:24) (18 - 18)  SpO2: 96% (16 May 2025 04:24) (96% - 98%)    Parameters below as of 16 May 2025 04:24  Patient On (Oxygen Delivery Method): room air        PHYSICAL EXAM:    General: No Acute Distress     Neurological: Awake, Ox3 (name, place, date), pupils 3mm react b/l, EOMI, following commands, no drift, uppers 5/5, lowers 5/5, SILT    Pulmonary: Clear to Auscultation, No Rales, No Rhonchi, No Wheezes     Cardiovascular: S1, S2, Regular Rate and Rhythm     Gastrointestinal: Soft, Nontender, Nondistended     Incision: right groin puncture C/D/I, soft    LABS:                        13.3   7.83  )-----------( 374      ( 16 May 2025 07:28 )             41.6    05-16    138  |  102  |  17  ----------------------------<  84  4.5   |  23  |  0.85    Ca    9.7      16 May 2025 07:28    PTT - ( 16 May 2025 07:28 )  PTT:48.0 sec      05-15 @ 07:01  -  05-16 @ 07:00  --------------------------------------------------------  IN: 360 mL / OUT: 1800 mL / NET: -1440 mL      DRAINS: None    MEDICATIONS:  Antibiotics:    Neuro:  acetaminophen     Tablet .. 650 milliGRAM(s) Oral every 6 hours PRN Temp greater or equal to 38C (100.4F), Mild Pain (1 - 3)    Cardiac:  midodrine 10 milliGRAM(s) Oral every 8 hours    Pulm:    GI/:  bisacodyl Suppository 10 milliGRAM(s) Rectal once  polyethylene glycol 3350 17 Gram(s) Oral two times a day  senna 2 Tablet(s) Oral at bedtime    Other:   atorvastatin 80 milliGRAM(s) Oral at bedtime  heparin  Infusion 1150 Unit(s)/Hr IV Continuous <Continuous>    DIET: [] Regular [x] CCD [] Renal [] Puree [] Dysphagia [] Tube Feeds:     IMAGING:   < from: CT Head No Cont (05.11.25 @ 09:08) >  IMPRESSION: Small scattered evolving acute/subacute right-sided MCA   distribution infarcts are again seen, and were more optimally assessed on   MR modality. No hemorrhagic transformation.    Multiple additional unchanged chronic intracranial findings, as discussed.    --- End of Report ---    SAMPSON AGUREO MD; Attending Radiologist  This document has been electronically signed. May 11 2025  9:16AM    < end of copied text >    < from: MR Angio Head No Cont (05.06.25 @ 13:06) >  IMPRESSION:    1.  RIGHT CAROTID NECK CIRCULATION:   Right internal carotid arterial   occlusion. T1 hyperintensity within the carotid bulb may reflect   intramural hematoma of arterial dissection    2.  LEFT CAROTID NECK CIRCULATION:    Atherosclerotic plaque without   hemodynamically significant stenosis.    3.  VERTEBRAL NECK CIRCULATION:   Intact    4.  ANTERIOR INTRACRANIAL CIRCULATION:     Right internal carotid   arterial occlusion. Right middle cerebral arterial occlusion. The right   anterior cerebral artery is perfused via left-to-right anterior   collateral circulation via patent anterior communicating artery. Left   internal carotid clinoid segment focal stenosis, moderate    5.  POSTERIOR INTRACRANIAL CIRCULATION:   Intracranial atherosclerosis   each vertebral artery, severe, in each posterior cerebral artery, mild to   moderate on the right and moderate to severe on the left.    6.  BRAIN:    Scattered foci of acute infarction within the right   cerebral hemisphere have anterior to posterior parasagittal distribution   suggesting low-flow/watershed mechanism. The largest component is within   the right posterior putamen, bordering remote hemorrhagic infarction   within the anterior putamen. Additional small cortical foci are noted in   the right parietal lobe and subcortical foci within the right frontal and   parietal lobes.    --- End of Report ---      JUAN PADILLA MD; Attending Radiologist  This document has beenelectronically signed. May  6 2025  1:40PM    < end of copied text >

## 2025-05-17 LAB
APTT BLD: 41.2 SEC — HIGH (ref 26.1–36.8)
APTT BLD: 88.3 SEC — HIGH (ref 26.1–36.8)
APTT BLD: 88.6 SEC — HIGH (ref 26.1–36.8)
APTT BLD: 90 SEC — HIGH (ref 26.1–36.8)

## 2025-05-17 PROCEDURE — 99233 SBSQ HOSP IP/OBS HIGH 50: CPT

## 2025-05-17 PROCEDURE — 99232 SBSQ HOSP IP/OBS MODERATE 35: CPT

## 2025-05-17 RX ORDER — HEPARIN SODIUM 1000 [USP'U]/ML
1200 INJECTION INTRAVENOUS; SUBCUTANEOUS
Qty: 25000 | Refills: 0 | Status: DISCONTINUED | OUTPATIENT
Start: 2025-05-17 | End: 2025-05-18

## 2025-05-17 RX ORDER — MAGNESIUM HYDROXIDE 400 MG/5ML
30 SUSPENSION ORAL DAILY
Refills: 0 | Status: DISCONTINUED | OUTPATIENT
Start: 2025-05-17 | End: 2025-05-18

## 2025-05-17 RX ORDER — BISACODYL 5 MG
5 TABLET, DELAYED RELEASE (ENTERIC COATED) ORAL EVERY 12 HOURS
Refills: 0 | Status: DISCONTINUED | OUTPATIENT
Start: 2025-05-17 | End: 2025-05-21

## 2025-05-17 RX ADMIN — MIDODRINE HYDROCHLORIDE 10 MILLIGRAM(S): 5 TABLET ORAL at 06:41

## 2025-05-17 RX ADMIN — HEPARIN SODIUM 12 UNIT(S)/HR: 1000 INJECTION INTRAVENOUS; SUBCUTANEOUS at 20:46

## 2025-05-17 RX ADMIN — MAGNESIUM HYDROXIDE 30 MILLILITER(S): 400 SUSPENSION ORAL at 13:53

## 2025-05-17 RX ADMIN — MIDODRINE HYDROCHLORIDE 10 MILLIGRAM(S): 5 TABLET ORAL at 21:10

## 2025-05-17 RX ADMIN — HEPARIN SODIUM 13 UNIT(S)/HR: 1000 INJECTION INTRAVENOUS; SUBCUTANEOUS at 10:40

## 2025-05-17 RX ADMIN — Medication 2 TABLET(S): at 21:10

## 2025-05-17 RX ADMIN — POLYETHYLENE GLYCOL 3350 17 GRAM(S): 17 POWDER, FOR SOLUTION ORAL at 16:46

## 2025-05-17 RX ADMIN — POLYETHYLENE GLYCOL 3350 17 GRAM(S): 17 POWDER, FOR SOLUTION ORAL at 06:41

## 2025-05-17 RX ADMIN — HEPARIN SODIUM 13.5 UNIT(S)/HR: 1000 INJECTION INTRAVENOUS; SUBCUTANEOUS at 07:28

## 2025-05-17 RX ADMIN — ATORVASTATIN CALCIUM 80 MILLIGRAM(S): 80 TABLET, FILM COATED ORAL at 21:10

## 2025-05-17 RX ADMIN — MIDODRINE HYDROCHLORIDE 10 MILLIGRAM(S): 5 TABLET ORAL at 13:54

## 2025-05-17 RX ADMIN — Medication 5 MILLIGRAM(S): at 16:46

## 2025-05-17 NOTE — PROGRESS NOTE ADULT - ASSESSMENT
64M, PMHx HTN and HLD not on AC/AP presenting to [facility] as a code stroke for left sided weakness. LKW 7AM 5/5. Patient then felt LUE/LLE weakness. Had an episode of speech disturbance also around 9:30 AM, that resolved after unknown duration of time. Also, over the past few months, patient has had numbness in LUE/LLE coming and going. Denies HA, vision changes.    ICU COURSE:  5/5: admitted to NSCU for hemodynamic monitoring to see if neurological exam is pressure dependent.  5/8: s/p diagnostic angio confirming significant Right ICA stenosis but improving MCA clot burden, no stenting performed.  5/9: SBP liberalized to 110-160 from 120-160.  5/10: SBP liberalized to 100-160 from 110-160.  5/12 downgraded to neurosurgery floor  5/13 hospitalist consulted and cleared for further procedure/OR    PLAN:  - neuro and vital check Q4hrs  - continue heparin gtt with PTT goal 50-70 for RMCA clot, PTT was stable at rate 1350u/hr but 88 this am, repeat still 88, rate decreased to 1300u/hr, will f/u repeat PTT Q6hrs  - plan to repeat CTA H/N on Monday to assess clot burden, pending CEA vs angio/stent for OSVALDO stenosis  - pain control with tylenol prn  - vitals reviewed stable, continue midodrine 10mg Q8hrs  - HLD, continue lipitor  - tolerating CCD diet  - last BM on 5/14, standing dulcolax and mg hydroxide added for bowel regimens  - activity OOB as tolerated  - incentive spirometer  - DVT ppx: b/l SCDs and heparin gtt  Discharge planning: PT/OT- outpatient PT    plan to be discussed with Dr. Julia stewart [id number]

## 2025-05-17 NOTE — PROGRESS NOTE ADULT - SUBJECTIVE AND OBJECTIVE BOX
[facility] Division of Hospital Medicine  Yo Burgess MD M-F, 8A-5P: MS Teams  Other Times: HIC Extension / HIC Pager      Patient is a 64y old  Male who presents with a chief complaint of OSVALDO occlusion (17 May 2025 12:11)      SUBJECTIVE / OVERNIGHT EVENTS:  Patient was examined    ADDITIONAL REVIEW OF SYSTEMS:    MEDICATIONS  (STANDING):  atorvastatin 80 milliGRAM(s) Oral at bedtime  bisacodyl 5 milliGRAM(s) Oral every 12 hours  heparin  Infusion 1150 Unit(s)/Hr (13 mL/Hr) IV Continuous <Continuous>  magnesium hydroxide Suspension 30 milliLiter(s) Oral daily  midodrine 10 milliGRAM(s) Oral every 8 hours  polyethylene glycol 3350 17 Gram(s) Oral two times a day  senna 2 Tablet(s) Oral at bedtime    MEDICATIONS  (PRN):  acetaminophen     Tablet .. 650 milliGRAM(s) Oral every 6 hours PRN Temp greater or equal to 38C (100.4F), Mild Pain (1 - 3)      CAPILLARY BLOOD GLUCOSE          I&O's Summary    16 May 2025 07:01  -  17 May 2025 07:00  --------------------------------------------------------  IN: 1230 mL / OUT: 1850 mL / NET: -620 mL    17 May 2025 07:01  -  17 May 2025 12:52  --------------------------------------------------------  IN: 480 mL / OUT: 0 mL / NET: 480 mL        Daily     Daily     PHYSICAL EXAM:  Vital Signs Last 24 Hrs  T(C): 37 (17 May 2025 09:00), Max: 37.1 (17 May 2025 04:00)  T(F): 98.6 (17 May 2025 09:00), Max: 98.8 (17 May 2025 04:00)  HR: 66 (17 May 2025 09:00) (61 - 67)  BP: 124/72 (17 May 2025 09:00) (113/73 - 139/82)  BP(mean): --  RR: 18 (17 May 2025 09:00) (18 - 18)  SpO2: 96% (17 May 2025 09:00) (94% - 98%)    Parameters below as of 17 May 2025 09:00  Patient On (Oxygen Delivery Method): room air      CONSTITUTIONAL: NAD, well-developed, well-groomed  EYES: PERRLA; conjunctiva and sclera clear  ENMT: Moist oral mucosa, no pharyngeal injection or exudates; normal dentition  NECK: Supple, no palpable masses; no thyromegaly  RESPIRATORY: Normal respiratory effort; lungs are clear to auscultation bilaterally  CARDIOVASCULAR: Regular rate and rhythm, normal S1 and S2, no murmur/rub/gallop; No lower extremity edema; Peripheral pulses are 2+ bilaterally  ABDOMEN: Nontender to palpation, normoactive bowel sounds, no rebound/guarding; No hepatosplenomegaly  MUSCULOSKELETAL:  no clubbing or cyanosis of digits; no joint swelling or tenderness to palpation, moving all extremities   PSYCH: A+O to person, place, and time; affect appropriate  NEUROLOGY: CN 2-12 are intact and symmetric; no gross sensory/motor deficits   SKIN: No rashes; no palpable lesions    LABS:                        13.3   7.83  )-----------( 374      ( 16 May 2025 07:28 )             41.6     05-16    138  |  102  |  17  ----------------------------<  84  4.5   |  23  |  0.85    Ca    9.7      16 May 2025 07:28        PTT - ( 17 May 2025 09:48 )  PTT:88.6 sec      Urinalysis Basic - ( 16 May 2025 07:28 )    Color: x / Appearance: x / SG: x / pH: x  Gluc: 84 mg/dL / Ketone: x  / Bili: x / Urobili: x   Blood: x / Protein: x / Nitrite: x   Leuk Esterase: x / RBC: x / WBC x   Sq Epi: x / Non Sq Epi: x / Bacteria: x            RADIOLOGY & ADDITIONAL TESTS:  Results Reviewed:   Imaging Personally Reviewed:  Electrocardiogram Personally Reviewed:    COORDINATION OF CARE:  Care Discussed with Consultants/Other Providers [Y/N]:  Prior or Outpatient Records Reviewed [Y/N]:   [facility] Division of Hospital Medicine  Yo Burgess MD M-F, 8A-5P: MS Teams  Other Times: HIC Extension / HIC Pager      Patient is a 64y old  Male who presents with a chief complaint of OSVALDO occlusion (17 May 2025 12:11)      SUBJECTIVE / OVERNIGHT EVENTS:  Patient was examined today. He denies any acute complaint, denies headache, dizziness, fever, chills, chest pain, N/V, abd pain.    ADDITIONAL REVIEW OF SYSTEMS:    MEDICATIONS  (STANDING):  atorvastatin 80 milliGRAM(s) Oral at bedtime  bisacodyl 5 milliGRAM(s) Oral every 12 hours  heparin  Infusion 1150 Unit(s)/Hr (13 mL/Hr) IV Continuous <Continuous>  magnesium hydroxide Suspension 30 milliLiter(s) Oral daily  midodrine 10 milliGRAM(s) Oral every 8 hours  polyethylene glycol 3350 17 Gram(s) Oral two times a day  senna 2 Tablet(s) Oral at bedtime    MEDICATIONS  (PRN):  acetaminophen     Tablet .. 650 milliGRAM(s) Oral every 6 hours PRN Temp greater or equal to 38C (100.4F), Mild Pain (1 - 3)      CAPILLARY BLOOD GLUCOSE          I&O's Summary    16 May 2025 07:01  -  17 May 2025 07:00  --------------------------------------------------------  IN: 1230 mL / OUT: 1850 mL / NET: -620 mL    17 May 2025 07:01  -  17 May 2025 12:52  --------------------------------------------------------  IN: 480 mL / OUT: 0 mL / NET: 480 mL        Daily     Daily     PHYSICAL EXAM:  Vital Signs Last 24 Hrs  T(C): 37 (17 May 2025 09:00), Max: 37.1 (17 May 2025 04:00)  T(F): 98.6 (17 May 2025 09:00), Max: 98.8 (17 May 2025 04:00)  HR: 66 (17 May 2025 09:00) (61 - 67)  BP: 124/72 (17 May 2025 09:00) (113/73 - 139/82)  BP(mean): --  RR: 18 (17 May 2025 09:00) (18 - 18)  SpO2: 96% (17 May 2025 09:00) (94% - 98%)    Parameters below as of 17 May 2025 09:00  Patient On (Oxygen Delivery Method): room air      CONSTITUTIONAL: NAD, well-developed, well-groomed  EYES: PERRLA; conjunctiva and sclera clear  ENMT: Moist oral mucosa, no pharyngeal injection or exudates; normal dentition  NECK: Supple, no palpable masses; no thyromegaly  RESPIRATORY: Normal respiratory effort; lungs are clear to auscultation bilaterally  CARDIOVASCULAR: Regular rate and rhythm, normal S1 and S2, no murmur/rub/gallop; No lower extremity edema  ABDOMEN: Soft, Nondistended, Nontender to palpation, normoactive bowel sounds  MUSCULOSKELETAL: No clubbing or cyanosis of digits; +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  PSYCH: A+O to person, place, and time; affect appropriate  NEUROLOGY: CN 2-12 are intact and symmetric; no gross sensory deficits, 5/5 strength throughout  SKIN: No rashes; no palpable lesions      LABS:                        13.3   7.83  )-----------( 374      ( 16 May 2025 07:28 )             41.6     05-16    138  |  102  |  17  ----------------------------<  84  4.5   |  23  |  0.85    Ca    9.7      16 May 2025 07:28        PTT - ( 17 May 2025 09:48 )  PTT:88.6 sec      Urinalysis Basic - ( 16 May 2025 07:28 )    Color: x / Appearance: x / SG: x / pH: x  Gluc: 84 mg/dL / Ketone: x  / Bili: x / Urobili: x   Blood: x / Protein: x / Nitrite: x   Leuk Esterase: x / RBC: x / WBC x   Sq Epi: x / Non Sq Epi: x / Bacteria: x            RADIOLOGY & ADDITIONAL TESTS:  Results Reviewed:   Imaging Personally Reviewed:  Electrocardiogram Personally Reviewed:    COORDINATION OF CARE:  Care Discussed with Consultants/Other Providers [Y/N]:  Prior or Outpatient Records Reviewed [Y/N]:

## 2025-05-17 NOTE — PROGRESS NOTE ADULT - ASSESSMENT
64 male with PMH of HTN and HLD not on AC/AP presenting to [facility] as a code stroke for left sided weakness. s/p dx angio on 5/8 confirming significant Right ICA stenosis but improving MCA clot burden, no stenting performed. On hep gtt. Transferred out of NSCU on 5/12. Medicine consulted for pre-op evaluation for stenting vs. CEA.

## 2025-05-17 NOTE — PROGRESS NOTE ADULT - SUBJECTIVE AND OBJECTIVE BOX
HPI:  64 LHM PMHx HTN and HLD not on AC/AP presenting to [facility] as a code stroke for left sided weakness. LKW 7AM 5/5. Patient then felt LUE/LLE weakness. Had an episode of speech disturbance also around 9:30 AM, that resolved after unknown duration of time. Also, over the past few months, patient has had numbness in LUE/LLE coming and going. Denies HA, vision changes. Denies prior history of stroke. BP on presentation 113/77    NIHSS:4  preMRS:0    Not a tenecteplase candidate outside of the time window.   Not a thrombectomy candidate given likely chronic occlusion per neurointerventional team  (05 May 2025 15:08)    OVERNIGHT EVENTS:  Vital Signs Last 24 Hrs  T(C): 37 (17 May 2025 09:00), Max: 37.1 (17 May 2025 04:00)  T(F): 98.6 (17 May 2025 09:00), Max: 98.8 (17 May 2025 04:00)  HR: 66 (17 May 2025 09:00) (61 - 67)  BP: 124/72 (17 May 2025 09:00) (113/73 - 139/82)  BP(mean): --  RR: 18 (17 May 2025 09:00) (18 - 18)  SpO2: 96% (17 May 2025 09:00) (94% - 98%)    Parameters below as of 17 May 2025 09:00  Patient On (Oxygen Delivery Method): room air        I&O's Detail    16 May 2025 07:01  -  17 May 2025 07:00  --------------------------------------------------------  IN:    Oral Fluid: 1230 mL  Total IN: 1230 mL    OUT:    Voided (mL): 1850 mL  Total OUT: 1850 mL    Total NET: -620 mL      17 May 2025 07:01  -  17 May 2025 12:11  --------------------------------------------------------  IN:    Oral Fluid: 480 mL  Total IN: 480 mL    OUT:  Total OUT: 0 mL    Total NET: 480 mL        I&O's Summary    16 May 2025 07:01  -  17 May 2025 07:00  --------------------------------------------------------  IN: 1230 mL / OUT: 1850 mL / NET: -620 mL    17 May 2025 07:01  -  17 May 2025 12:11  --------------------------------------------------------  IN: 480 mL / OUT: 0 mL / NET: 480 mL        PHYSICAL EXAM:  Neurological:    Motor exam:         [] Upper extremity              Delt        Bicep      Tricep     HG                                               R         5/5        5/5         5/5          5/5                                               L          5/5        5/5         5/5          5/5         [] Lower extremity             HF         KF          KE         PF          DF                                               R        5/5        5/5        5/5       5/5        5/5                                               L         5/5        5/5       5/5        5/5        5/5                                                        [] warm well perfused    Sensation: [] intact to light touch  [] decreased:       Cardiovascular: +s1, s2  Respiratory: clear to auscultation b/l  Gastrointestinal: soft, non-distended, non-tender  Genitourinary:   Extremities:  Incision/Wound:    TUBES/LINES:  [] CVC  [] A-line  [] Lumbar Drain  [] Ventriculostomy  [] Other    DIET:  [] NPO  [] Mechanical  [] Tube feeds    LABS:                        13.3   7.83  )-----------( 374      ( 16 May 2025 07:28 )             41.6     05-16    138  |  102  |  17  ----------------------------<  84  4.5   |  23  |  0.85    Ca    9.7      16 May 2025 07:28      PTT - ( 17 May 2025 09:48 )  PTT:88.6 sec  Urinalysis Basic - ( 16 May 2025 07:28 )    Color: x / Appearance: x / SG: x / pH: x  Gluc: 84 mg/dL / Ketone: x  / Bili: x / Urobili: x   Blood: x / Protein: x / Nitrite: x   Leuk Esterase: x / RBC: x / WBC x   Sq Epi: x / Non Sq Epi: x / Bacteria: x          CAPILLARY BLOOD GLUCOSE          Drug Levels: [] N/A    CSF Analysis: [] N/A      Allergies    No Known Allergies    Intolerances      MEDICATIONS:  Antibiotics:    Neuro:  acetaminophen     Tablet .. 650 milliGRAM(s) Oral every 6 hours PRN    Anticoagulation:  heparin  Infusion 1150 Unit(s)/Hr IV Continuous <Continuous>    OTHER:  atorvastatin 80 milliGRAM(s) Oral at bedtime  bisacodyl Suppository 10 milliGRAM(s) Rectal once  midodrine 10 milliGRAM(s) Oral every 8 hours  polyethylene glycol 3350 17 Gram(s) Oral two times a day  senna 2 Tablet(s) Oral at bedtime    IVF:    CULTURES:    RADIOLOGY & ADDITIONAL TESTS:        [id number]  Patient was seen at bedside this am. Patient felt well. Denied any headache, cp, sob, n/v, or abd pain.    OVERNIGHT EVENTS: no acute event overnight    Vital Signs Last 24 Hrs  T(C): 37 (17 May 2025 09:00), Max: 37.1 (17 May 2025 04:00)  T(F): 98.6 (17 May 2025 09:00), Max: 98.8 (17 May 2025 04:00)  HR: 66 (17 May 2025 09:00) (61 - 67)  BP: 124/72 (17 May 2025 09:00) (113/73 - 139/82)  BP(mean): --  RR: 18 (17 May 2025 09:00) (18 - 18)  SpO2: 96% (17 May 2025 09:00) (94% - 98%)    Parameters below as of 17 May 2025 09:00  Patient On (Oxygen Delivery Method): room air        I&O's Detail    16 May 2025 07:01  -  17 May 2025 07:00  --------------------------------------------------------  IN:    Oral Fluid: 1230 mL  Total IN: 1230 mL    OUT:    Voided (mL): 1850 mL  Total OUT: 1850 mL    Total NET: -620 mL      17 May 2025 07:01  -  17 May 2025 12:11  --------------------------------------------------------  IN:    Oral Fluid: 480 mL  Total IN: 480 mL    OUT:  Total OUT: 0 mL    Total NET: 480 mL        I&O's Summary    16 May 2025 07:01  -  17 May 2025 07:00  --------------------------------------------------------  IN: 1230 mL / OUT: 1850 mL / NET: -620 mL    17 May 2025 07:01  -  17 May 2025 12:11  --------------------------------------------------------  IN: 480 mL / OUT: 0 mL / NET: 480 mL        PHYSICAL EXAM:  Neurological:  awake, alert, oriented to person, place, and date, face symmetric, speech clear and fluent, following commands, moving all extremities 5/5, sensation intact to light touch  Cardiovascular: +s1, s2  Respiratory: clear to auscultation b/l  Gastrointestinal: soft, non-distended, non-tender  Genitourinary: voiding   Extremities: warm, dry  Incision/Wound: groin access site C/D/I    LABS:                        13.3   7.83  )-----------( 374      ( 16 May 2025 07:28 )             41.6     05-16    138  |  102  |  17  ----------------------------<  84  4.5   |  23  |  0.85    Ca    9.7      16 May 2025 07:28      PTT - ( 17 May 2025 09:48 )  PTT:88.6 sec  Urinalysis Basic - ( 16 May 2025 07:28 )    Color: x / Appearance: x / SG: x / pH: x  Gluc: 84 mg/dL / Ketone: x  / Bili: x / Urobili: x   Blood: x / Protein: x / Nitrite: x   Leuk Esterase: x / RBC: x / WBC x   Sq Epi: x / Non Sq Epi: x / Bacteria: x          CAPILLARY BLOOD GLUCOSE          Drug Levels: [] N/A    CSF Analysis: [] N/A      Allergies    No Known Allergies    Intolerances      MEDICATIONS:  Antibiotics:    Neuro:  acetaminophen     Tablet .. 650 milliGRAM(s) Oral every 6 hours PRN    Anticoagulation:  heparin  Infusion 1150 Unit(s)/Hr IV Continuous <Continuous>    OTHER:  atorvastatin 80 milliGRAM(s) Oral at bedtime  bisacodyl Suppository 10 milliGRAM(s) Rectal once  midodrine 10 milliGRAM(s) Oral every 8 hours  polyethylene glycol 3350 17 Gram(s) Oral two times a day  senna 2 Tablet(s) Oral at bedtime    IVF:    CULTURES:    RADIOLOGY & ADDITIONAL TESTS:

## 2025-05-17 NOTE — PROGRESS NOTE ADULT - PROBLEM SELECTOR PLAN 1
Please follow up with Dr. Mendez at your next appointment on Monday. Come back to L and D if you break your water, feel regular contractions or don't feel the baby move. - s/p dx angio on 5/8 confirming significant Right ICA stenosis but improving MCA clot burden  - TTE with no PFO  - c/w Atorvastatin 80mg qHS  - c/w hep gtt as per NSGY  - c/w midodrine 10mg TID   - plan for stenting vs. CEA - final plan TBD. - s/p dx angio on 5/8 confirming significant Right ICA stenosis but improving MCA clot burden  - TTE with no PFO  - c/w Atorvastatin 80mg qHS  - c/w hep gtt as per NSGY  - c/w midodrine 10mg TID   - plan for stenting vs. CEA - final plan TBD.  >     > CTA head and neck planned for monday.

## 2025-05-18 LAB
APTT BLD: 58.5 SEC — HIGH (ref 26.1–36.8)
APTT BLD: 69.4 SEC — HIGH (ref 26.1–36.8)
APTT BLD: 73.6 SEC — HIGH (ref 26.1–36.8)
HCT VFR BLD CALC: 43.5 % — SIGNIFICANT CHANGE UP (ref 39–50)
HGB BLD-MCNC: 14.2 G/DL — SIGNIFICANT CHANGE UP (ref 13–17)
MCHC RBC-ENTMCNC: 29.5 PG — SIGNIFICANT CHANGE UP (ref 27–34)
MCHC RBC-ENTMCNC: 32.6 G/DL — SIGNIFICANT CHANGE UP (ref 32–36)
MCV RBC AUTO: 90.4 FL — SIGNIFICANT CHANGE UP (ref 80–100)
NRBC BLD AUTO-RTO: 0 /100 WBCS — SIGNIFICANT CHANGE UP (ref 0–0)
PLATELET # BLD AUTO: 388 K/UL — SIGNIFICANT CHANGE UP (ref 150–400)
RBC # BLD: 4.81 M/UL — SIGNIFICANT CHANGE UP (ref 4.2–5.8)
RBC # FLD: 13.2 % — SIGNIFICANT CHANGE UP (ref 10.3–14.5)
WBC # BLD: 7.69 K/UL — SIGNIFICANT CHANGE UP (ref 3.8–10.5)
WBC # FLD AUTO: 7.69 K/UL — SIGNIFICANT CHANGE UP (ref 3.8–10.5)

## 2025-05-18 PROCEDURE — 99233 SBSQ HOSP IP/OBS HIGH 50: CPT

## 2025-05-18 RX ORDER — HEPARIN SODIUM 1000 [USP'U]/ML
1150 INJECTION INTRAVENOUS; SUBCUTANEOUS
Qty: 25000 | Refills: 0 | Status: DISCONTINUED | OUTPATIENT
Start: 2025-05-18 | End: 2025-05-19

## 2025-05-18 RX ORDER — MAGNESIUM CITRATE
296 SOLUTION, ORAL ORAL ONCE
Refills: 0 | Status: COMPLETED | OUTPATIENT
Start: 2025-05-18 | End: 2025-05-18

## 2025-05-18 RX ADMIN — ATORVASTATIN CALCIUM 80 MILLIGRAM(S): 80 TABLET, FILM COATED ORAL at 21:07

## 2025-05-18 RX ADMIN — POLYETHYLENE GLYCOL 3350 17 GRAM(S): 17 POWDER, FOR SOLUTION ORAL at 05:47

## 2025-05-18 RX ADMIN — MIDODRINE HYDROCHLORIDE 10 MILLIGRAM(S): 5 TABLET ORAL at 21:07

## 2025-05-18 RX ADMIN — Medication 5 MILLIGRAM(S): at 06:02

## 2025-05-18 RX ADMIN — MIDODRINE HYDROCHLORIDE 10 MILLIGRAM(S): 5 TABLET ORAL at 05:47

## 2025-05-18 RX ADMIN — Medication 296 MILLILITER(S): at 18:21

## 2025-05-18 RX ADMIN — HEPARIN SODIUM 11 UNIT(S)/HR: 1000 INJECTION INTRAVENOUS; SUBCUTANEOUS at 18:22

## 2025-05-18 RX ADMIN — MIDODRINE HYDROCHLORIDE 10 MILLIGRAM(S): 5 TABLET ORAL at 18:22

## 2025-05-18 NOTE — PROGRESS NOTE ADULT - ASSESSMENT
ASSESSMENT: HPI:  64 LHM PMHx HTN and HLD not on AC/AP presenting to [facility] as a code stroke for left sided weakness. LKW 7AM 5/5. Patient then felt LUE/LLE weakness. Had an episode of speech disturbance also around 9:30 AM, that resolved after unknown duration of time. Also, over the past few months, patient has had numbness in LUE/LLE coming and going. Denies HA, vision changes. Denies prior history of stroke. BP on presentation 113/77    Not a tenecteplase candidate outside of the time window.   Not a thrombectomy candidate given likely chronic occlusion per neurointerventional team  (05 May 2025 15:08)    NEURO:  q4 neurochecks/q4 vitals   5/6 MRA Neck:  Right internal carotid arterial occlusion  -On Heparin gtt@11.5 (PTT goal 50-70) - PTT this AM 58.5, f/u PTT this afternoon   5/11 CTH: Small scattered evolving acute/subacute right-sided MCA distribution infarcts are again seen. No hemorrhagic transformation  Plan for repeat CTA H/N tomorrow   Pain control PRN Tylenol   Activity: OOB as tolerated     PULM:  Incentive spirometry, mobilize as tolerated    CV:  Keep -150mmHg  5/6 TTE: wnl   Continue Lipitor for hyperlipidemia  Continue Midodrine 10mg q8     RENAL:  IVL  Voiding     GI:  Diet: CCD/No snacks  Bowel regimen: senna, miralax, dulcolax  Last BM 5/14 - will order mag citrate     ENDO:   Goal euglycemia (-180)    HEME/ONC:  VTE prophylaxis: [] SCDs [] chemoprophylaxis [] hold chemoprophylaxis due to: [] high risk of DVT/PE on admission due to:  5/5 LED: negative     ID:  Afebrile     DISPOSITION:  Outpatient PT/No skilled OT needs once medically ready    Plan to be d/w Dr. Dumont  #[id number]      ASSESSMENT: HPI:  64 LHM PMHx HTN and HLD not on AC/AP presenting to [facility] as a code stroke for left sided weakness. LKW 7AM 5/5. Patient then felt LUE/LLE weakness. Had an episode of speech disturbance also around 9:30 AM, that resolved after unknown duration of time. Also, over the past few months, patient has had numbness in LUE/LLE coming and going. Denies HA, vision changes. Denies prior history of stroke. BP on presentation 113/77    Not a tenecteplase candidate outside of the time window.   Not a thrombectomy candidate given likely chronic occlusion per neurointerventional team  (05 May 2025 15:08)    Pt and wife updated at bedside with  Delfina ID# [id number]    NEURO:  q4 neurochecks/q4 vitals   5/6 MRA Neck:  Right internal carotid arterial occlusion  -On Heparin gtt@11.5 (PTT goal 50-70) - PTT this AM 58.5, f/u PTT this afternoon   5/11 CTH: Small scattered evolving acute/subacute right-sided MCA distribution infarcts are again seen. No hemorrhagic transformation  Plan for repeat CTA H/N tomorrow   Pain control PRN Tylenol   Activity: OOB as tolerated     PULM:  Incentive spirometry, mobilize as tolerated    CV:  Keep -150mmHg  5/6 TTE: wnl   Continue Lipitor for hyperlipidemia  Continue Midodrine 10mg q8     RENAL:  IVL  Voiding     GI:  Diet: CCD/No snacks  Bowel regimen: senna, miralax, dulcolax  Last BM 5/14 - will order mag citrate     ENDO:   Goal euglycemia (-180)    HEME/ONC:  VTE prophylaxis: SCDs  Continue Heparin gtt for ICA stenosis   5/5 LED: negative     ID:  Afebrile     DISPOSITION:  Outpatient PT/No skilled OT needs once medically ready    Plan to be d/w Dr. Dumont  #[id number]

## 2025-05-18 NOTE — PROGRESS NOTE ADULT - PROBLEM SELECTOR PLAN 5
DVT ppx: on hep gtt.
DVT ppx: on hep gtt.    - Last BM reported 5/14 : optimize bowel regimen, monitor stools.

## 2025-05-18 NOTE — PROGRESS NOTE ADULT - SUBJECTIVE AND OBJECTIVE BOX
SUBJECTIVE:   no acute overnight events, pt sitting in chair this AM     Vital Signs Last 24 Hrs  T(C): 36.7 (05-18-25 @ 08:02), Max: 36.9 (05-17-25 @ 16:54)  T(F): 98.1 (05-18-25 @ 08:02), Max: 98.4 (05-17-25 @ 16:54)  HR: 62 (05-18-25 @ 08:02) (62 - 77)  BP: 132/74 (05-18-25 @ 08:02) (110/66 - 132/74)  BP(mean): --  RR: 18 (05-18-25 @ 08:02) (18 - 18)  SpO2: 95% (05-18-25 @ 08:02) (95% - 98%)    PHYSICAL EXAM:    Constitutional: No Acute Distress     Neurological: Awake, alert, Ox3, PERRL, FC, moving all extremities 5/5     Pulmonary: Clear to Auscultation    Cardiovascular: Regular rate and rhythm     Gastrointestinal: Soft, Non-tender    Extremities: No calf tenderness bilaterally          LABS:         PTT - ( 18 May 2025 07:02 )  PTT:58.5 sec    05-17 @ 07:01  -  05-18 @ 07:00  --------------------------------------------------------  IN: 1040 mL / OUT: 1800 mL / NET: -760 mL        IMAGING:   ACC: [id number] EXAM: CT BRAIN ORDERED BY: HARJEET HOLLAND    PROCEDURE DATE: 05/11/2025        INTERPRETATION: .    CLINICAL INFORMATION: Right-sided ICA/MCA occlusion. Follow-up.    TECHNIQUE: Multiple axial CT images of the head were obtained without contrast. Sagittal and coronal reconstructed images were acquired from the source data.    COMPARISON: Prior MRI study of the brain and MR angiography studies of the head and neck performed on 5/6/2025. Prior CT code stroke series from 5/5/2025.    FINDINGS: Evolving acute/subacute small infarcts are again seen within the right MCA distribution, more optimally assessed on MR modality. There is no gross hemorrhagic transformation.    Additional chronic lacunar infarct is seen within the right basal ganglia. Ex vacuo dilatation of the right lateral ventricle is seen.    There is no acute intracranial hemorrhage, shift of the fissures, herniation, or hydrocephalus.    The paranasal sinuses and tympanomastoid cavities are clear. The calvarium appears intact. The orbits appear unremarkable.    IMPRESSION: Small scattered evolving acute/subacute right-sided MCA distribution infarcts are again seen, and were more optimally assessed on MR modality. No hemorrhagic transformation.    Multiple additional unchanged chronic intracranial findings, as discussed.        --- End of Report ---  SAMPSON AGUERO MD; Attending Radiologist  This document has been electronically signed. May 11 2025 9:16AM    MEDICATIONS:  Neuro:  acetaminophen     Tablet .. 650 milliGRAM(s) Oral every 6 hours PRN Temp greater or equal to 38C (100.4F), Mild Pain (1 - 3)    Cardiac:  midodrine 10 milliGRAM(s) Oral every 8 hours    GI/:  bisacodyl 5 milliGRAM(s) Oral every 12 hours  magnesium hydroxide Suspension 30 milliLiter(s) Oral daily  polyethylene glycol 3350 17 Gram(s) Oral two times a day  senna 2 Tablet(s) Oral at bedtime    Other:   atorvastatin 80 milliGRAM(s) Oral at bedtime  heparin  Infusion 1150 Unit(s)/Hr IV Continuous <Continuous>        DIET: CCD/No snacks

## 2025-05-18 NOTE — PROGRESS NOTE ADULT - PROBLEM SELECTOR PLAN 1
- s/p dx angio on 5/8 confirming significant Right ICA stenosis but improving MCA clot burden  - TTE with no PFO  - c/w Atorvastatin 80mg qHS  - c/w hep gtt as per NSGY  - c/w midodrine 10mg TID   - plan for stenting vs. CEA - final plan TBD.  >     > CTA head and neck planned for monday per neurosurgery team.

## 2025-05-18 NOTE — PROGRESS NOTE ADULT - ASSESSMENT
64 male with PMH of HTN and HLD not on AC/AP presenting to [facility] as a code stroke for left sided weakness. s/p dx angio on 5/8 confirming significant Right ICA stenosis but improving MCA clot burden, no stenting performed. On hep gtt. Transferred out of [facility] on 5/12. Medicine consulted for pre-op evaluation for stenting vs. CEA.

## 2025-05-18 NOTE — PROGRESS NOTE ADULT - SUBJECTIVE AND OBJECTIVE BOX
[facility] Division of Hospital Medicine  Yo Burgess MD M-F, 8A-5P: MS Teams  Other Times: HIC Extension / HIC Pager      Patient is a 64y old  Male who presents with a chief complaint of OSVALDO occlusion (17 May 2025 12:52)      SUBJECTIVE / OVERNIGHT EVENTS:  Patient was examined    ADDITIONAL REVIEW OF SYSTEMS:    MEDICATIONS  (STANDING):  atorvastatin 80 milliGRAM(s) Oral at bedtime  bisacodyl 5 milliGRAM(s) Oral every 12 hours  heparin  Infusion 1150 Unit(s)/Hr (11.5 mL/Hr) IV Continuous <Continuous>  magnesium hydroxide Suspension 30 milliLiter(s) Oral daily  midodrine 10 milliGRAM(s) Oral every 8 hours  polyethylene glycol 3350 17 Gram(s) Oral two times a day  senna 2 Tablet(s) Oral at bedtime    MEDICATIONS  (PRN):  acetaminophen     Tablet .. 650 milliGRAM(s) Oral every 6 hours PRN Temp greater or equal to 38C (100.4F), Mild Pain (1 - 3)      CAPILLARY BLOOD GLUCOSE          I&O's Summary    17 May 2025 07:01  -  18 May 2025 07:00  --------------------------------------------------------  IN: 1040 mL / OUT: 1800 mL / NET: -760 mL        Daily     Daily     PHYSICAL EXAM:  Vital Signs Last 24 Hrs  T(C): 36.7 (18 May 2025 08:02), Max: 36.9 (17 May 2025 16:54)  T(F): 98.1 (18 May 2025 08:02), Max: 98.4 (17 May 2025 16:54)  HR: 62 (18 May 2025 08:02) (62 - 77)  BP: 132/74 (18 May 2025 08:02) (110/66 - 132/74)  BP(mean): --  RR: 18 (18 May 2025 08:02) (18 - 18)  SpO2: 95% (18 May 2025 08:02) (95% - 98%)    Parameters below as of 18 May 2025 08:02  Patient On (Oxygen Delivery Method): room air      CONSTITUTIONAL: NAD, well-developed, well-groomed  EYES: PERRLA; conjunctiva and sclera clear  ENMT: Moist oral mucosa, no pharyngeal injection or exudates; normal dentition  NECK: Supple, no palpable masses; no thyromegaly  RESPIRATORY: Normal respiratory effort; lungs are clear to auscultation bilaterally  CARDIOVASCULAR: Regular rate and rhythm, normal S1 and S2, no murmur/rub/gallop; No lower extremity edema; Peripheral pulses are 2+ bilaterally  ABDOMEN: Nontender to palpation, normoactive bowel sounds, no rebound/guarding; No hepatosplenomegaly  MUSCULOSKELETAL:  no clubbing or cyanosis of digits; no joint swelling or tenderness to palpation, moving all extremities   PSYCH: A+O to person, place, and time; affect appropriate  NEUROLOGY: CN 2-12 are intact and symmetric; no gross sensory/motor deficits   SKIN: No rashes; no palpable lesions    LABS:            PTT - ( 18 May 2025 07:02 )  PTT:58.5 sec              RADIOLOGY & ADDITIONAL TESTS:  Results Reviewed:   Imaging Personally Reviewed:  Electrocardiogram Personally Reviewed:    COORDINATION OF CARE:  Care Discussed with Consultants/Other Providers [Y/N]:  Prior or Outpatient Records Reviewed [Y/N]:   [facility] Division of Hospital Medicine  Yo Burgess MD M-F, 8A-5P: MS Teams  Other Times: HIC Extension / HIC Pager      Patient is a 64y old  Male who presents with a chief complaint of OSVALDO occlusion (17 May 2025 12:52)      SUBJECTIVE / OVERNIGHT EVENTS:  Patient was examined today. He states he feels well, no new complaint, on ROS he denies headache, dizziness, fever, chills, chest pain, dyspnea, abd pain, N/V, weakness, numbness, constipation.     ADDITIONAL REVIEW OF SYSTEMS:    MEDICATIONS  (STANDING):  atorvastatin 80 milliGRAM(s) Oral at bedtime  bisacodyl 5 milliGRAM(s) Oral every 12 hours  heparin  Infusion 1150 Unit(s)/Hr (11.5 mL/Hr) IV Continuous <Continuous>  magnesium hydroxide Suspension 30 milliLiter(s) Oral daily  midodrine 10 milliGRAM(s) Oral every 8 hours  polyethylene glycol 3350 17 Gram(s) Oral two times a day  senna 2 Tablet(s) Oral at bedtime    MEDICATIONS  (PRN):  acetaminophen     Tablet .. 650 milliGRAM(s) Oral every 6 hours PRN Temp greater or equal to 38C (100.4F), Mild Pain (1 - 3)      CAPILLARY BLOOD GLUCOSE          I&O's Summary    17 May 2025 07:01  -  18 May 2025 07:00  --------------------------------------------------------  IN: 1040 mL / OUT: 1800 mL / NET: -760 mL        Daily     Daily     PHYSICAL EXAM:  Vital Signs Last 24 Hrs  T(C): 36.7 (18 May 2025 08:02), Max: 36.9 (17 May 2025 16:54)  T(F): 98.1 (18 May 2025 08:02), Max: 98.4 (17 May 2025 16:54)  HR: 62 (18 May 2025 08:02) (62 - 77)  BP: 132/74 (18 May 2025 08:02) (110/66 - 132/74)  BP(mean): --  RR: 18 (18 May 2025 08:02) (18 - 18)  SpO2: 95% (18 May 2025 08:02) (95% - 98%)    Parameters below as of 18 May 2025 08:02  Patient On (Oxygen Delivery Method): room air      CONSTITUTIONAL: NAD, well-developed, well-groomed  EYES: PERRLA; conjunctiva and sclera clear  ENMT: Moist oral mucosa  NECK: Supple  RESPIRATORY: Normal respiratory effort; lungs are clear to auscultation bilaterally  CARDIOVASCULAR: Regular rate and rhythm, normal S1 and S2, no murmur/rub/gallop; No lower extremity edema  ABDOMEN: Soft, Nondistended, Nontender to palpation, normoactive bowel sounds  MUSCULOSKELETAL: No clubbing or cyanosis of digits; +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

## 2025-05-19 LAB
APTT BLD: 53.1 SEC — HIGH (ref 26.1–36.8)
APTT BLD: 59.3 SEC — HIGH (ref 26.1–36.8)
APTT BLD: 79.5 SEC — HIGH (ref 26.1–36.8)
HCT VFR BLD CALC: 41.1 % — SIGNIFICANT CHANGE UP (ref 39–50)
HGB BLD-MCNC: 13 G/DL — SIGNIFICANT CHANGE UP (ref 13–17)
MCHC RBC-ENTMCNC: 28.9 PG — SIGNIFICANT CHANGE UP (ref 27–34)
MCHC RBC-ENTMCNC: 31.6 G/DL — LOW (ref 32–36)
MCV RBC AUTO: 91.3 FL — SIGNIFICANT CHANGE UP (ref 80–100)
NRBC BLD AUTO-RTO: 0 /100 WBCS — SIGNIFICANT CHANGE UP (ref 0–0)
PLATELET # BLD AUTO: 433 K/UL — HIGH (ref 150–400)
RBC # BLD: 4.5 M/UL — SIGNIFICANT CHANGE UP (ref 4.2–5.8)
RBC # FLD: 12.9 % — SIGNIFICANT CHANGE UP (ref 10.3–14.5)
WBC # BLD: 10.55 K/UL — HIGH (ref 3.8–10.5)
WBC # FLD AUTO: 10.55 K/UL — HIGH (ref 3.8–10.5)

## 2025-05-19 PROCEDURE — 99232 SBSQ HOSP IP/OBS MODERATE 35: CPT

## 2025-05-19 PROCEDURE — 99223 1ST HOSP IP/OBS HIGH 75: CPT

## 2025-05-19 PROCEDURE — 70496 CT ANGIOGRAPHY HEAD: CPT | Mod: 26

## 2025-05-19 PROCEDURE — 70498 CT ANGIOGRAPHY NECK: CPT | Mod: 26

## 2025-05-19 RX ORDER — HEPARIN SODIUM 1000 [USP'U]/ML
1050 INJECTION INTRAVENOUS; SUBCUTANEOUS
Qty: 25000 | Refills: 0 | Status: DISCONTINUED | OUTPATIENT
Start: 2025-05-19 | End: 2025-05-20

## 2025-05-19 RX ADMIN — Medication 2 TABLET(S): at 21:46

## 2025-05-19 RX ADMIN — Medication 5 MILLIGRAM(S): at 05:27

## 2025-05-19 RX ADMIN — Medication 5 MILLIGRAM(S): at 18:27

## 2025-05-19 RX ADMIN — MIDODRINE HYDROCHLORIDE 10 MILLIGRAM(S): 5 TABLET ORAL at 21:46

## 2025-05-19 RX ADMIN — MIDODRINE HYDROCHLORIDE 10 MILLIGRAM(S): 5 TABLET ORAL at 16:47

## 2025-05-19 RX ADMIN — MIDODRINE HYDROCHLORIDE 10 MILLIGRAM(S): 5 TABLET ORAL at 05:27

## 2025-05-19 RX ADMIN — POLYETHYLENE GLYCOL 3350 17 GRAM(S): 17 POWDER, FOR SOLUTION ORAL at 18:27

## 2025-05-19 RX ADMIN — POLYETHYLENE GLYCOL 3350 17 GRAM(S): 17 POWDER, FOR SOLUTION ORAL at 05:28

## 2025-05-19 RX ADMIN — ATORVASTATIN CALCIUM 80 MILLIGRAM(S): 80 TABLET, FILM COATED ORAL at 21:46

## 2025-05-19 NOTE — PROGRESS NOTE ADULT - SUBJECTIVE AND OBJECTIVE BOX
SUBJECTIVE:     OVERNIGHT EVENTS: none    Vital Signs Last 24 Hrs  T(C): 36.8 (19 May 2025 16:14), Max: 36.9 (19 May 2025 08:35)  T(F): 98.3 (19 May 2025 16:14), Max: 98.5 (19 May 2025 08:35)  HR: 69 (19 May 2025 16:14) (67 - 75)  BP: 121/83 (19 May 2025 16:14) (116/78 - 132/83)  BP(mean): --  RR: 18 (19 May 2025 16:14) (18 - 18)  SpO2: 99% (19 May 2025 16:14) (96% - 99%)    Parameters below as of 19 May 2025 16:14  Patient On (Oxygen Delivery Method): room air        PHYSICAL EXAM:      Neurological: Awake, alert, Ox3, PERRL, FC, moving all extremities 5/5     Pulmonary: Clear to Auscultation    Cardiovascular: Regular rate and rhythm     Gastrointestinal: Soft, Non-tender    LABS:                        13.0   10.55 )-----------( 433      ( 19 May 2025 01:47 )             41.1        PTT - ( 19 May 2025 10:35 )  PTT:53.1 sec        IMAGING:       CT head CTA Head/ Neck     1. Brain: Chronic infarct in the right basal ganglia with ex vacuo dilatation of the right lateral ventricle. No acute infarct or hemorrhage is present.     2. Intracranial circulation: Calcifications of the cavernous and supraclinoid internal carotid arteries resulting in multifocal narrowing is identified, however appear patent, markedly improved on the right side as compared to the prior exam. Right middle cerebral arteries are widely patent, new since prior exam. Focal moderate narrowing of the A1 segment of the right [id number].     3. Right carotid/vertebral artery system: Critical stenosis at the origin of the right internal carotid artery (over 99%), followed by reconstitution in the proximal segment and throughout the rest of the internal carotid artery, improved since prior exam.     4. Left carotid/vertebral artery system: No hemodynamically significant stenosis.      MEDICATIONS:    acetaminophen     Tablet .. 650 milliGRAM(s) Oral every 6 hours PRN Temp greater or equal to 38C (100.4F), Mild Pain (1 - 3)  midodrine 10 milliGRAM(s) Oral every 8 hours  bisacodyl 5 milliGRAM(s) Oral every 12 hours  polyethylene glycol 3350 17 Gram(s) Oral two times a day  senna 2 Tablet(s) Oral at bedtime  atorvastatin 80 milliGRAM(s) Oral at bedtime  heparin  Infusion 1050 Unit(s)/Hr IV Continuous <Continuous>      DIET:

## 2025-05-19 NOTE — PROGRESS NOTE ADULT - ASSESSMENT
64 LHM PMHx HTN and HLD not on AC/AP presenting to [facility] as a code stroke for left sided weakness. LKW 7AM 5/5. Patient then felt LUE/LLE weakness. Had an episode of speech disturbance also around 9:30 AM, that resolved after unknown duration of time. Also, over the past few months, patient has had numbness in LUE/LLE coming and going. Denies HA, vision changes. Denies prior history of stroke. BP on presentation 113/77. Not a tenecteplase candidate outside of the time window. Not a thrombectomy candidate given likely chronic occlusion per neurointerventional team 5/6 MRA Neck:  Right internal carotid arterial occlusion. TTE wnl  Underwent diagnostic angio on 5/8/25 confirming signifiant R ICA stenosis but improving MCA clot burden, no stenting performed. Was started on hep gtt and midodrine in NSCU and monitored with no issues. Transferred out of NSCU on 5/12. Repeat CTA head & neck with improved intracranial circulation     Plan    Neuro- Continue heparin gtt PTT goal 50-70 & Midodrine . Plans for CEA vs stent pending   Vitals stable- On Midodrine 10mg q8. -130  D/w Dr Dumont at bedside   PT- outpatient PT

## 2025-05-20 LAB
APTT BLD: 45.8 SEC — HIGH (ref 26.1–36.8)
APTT BLD: 49.9 SEC — HIGH (ref 26.1–36.8)
HCT VFR BLD CALC: 43.6 % — SIGNIFICANT CHANGE UP (ref 39–50)
HGB BLD-MCNC: 13.9 G/DL — SIGNIFICANT CHANGE UP (ref 13–17)
MCHC RBC-ENTMCNC: 29.1 PG — SIGNIFICANT CHANGE UP (ref 27–34)
MCHC RBC-ENTMCNC: 31.9 G/DL — LOW (ref 32–36)
MCV RBC AUTO: 91.4 FL — SIGNIFICANT CHANGE UP (ref 80–100)
NRBC BLD AUTO-RTO: 0 /100 WBCS — SIGNIFICANT CHANGE UP (ref 0–0)
PLATELET # BLD AUTO: 440 K/UL — HIGH (ref 150–400)
RBC # BLD: 4.77 M/UL — SIGNIFICANT CHANGE UP (ref 4.2–5.8)
RBC # FLD: 13 % — SIGNIFICANT CHANGE UP (ref 10.3–14.5)
WBC # BLD: 7.93 K/UL — SIGNIFICANT CHANGE UP (ref 3.8–10.5)
WBC # FLD AUTO: 7.93 K/UL — SIGNIFICANT CHANGE UP (ref 3.8–10.5)

## 2025-05-20 PROCEDURE — 99232 SBSQ HOSP IP/OBS MODERATE 35: CPT | Mod: 57

## 2025-05-20 PROCEDURE — 99232 SBSQ HOSP IP/OBS MODERATE 35: CPT

## 2025-05-20 RX ORDER — HEPARIN SODIUM 1000 [USP'U]/ML
1100 INJECTION INTRAVENOUS; SUBCUTANEOUS
Qty: 25000 | Refills: 0 | Status: DISCONTINUED | OUTPATIENT
Start: 2025-05-20 | End: 2025-05-20

## 2025-05-20 RX ADMIN — MIDODRINE HYDROCHLORIDE 10 MILLIGRAM(S): 5 TABLET ORAL at 14:36

## 2025-05-20 RX ADMIN — POLYETHYLENE GLYCOL 3350 17 GRAM(S): 17 POWDER, FOR SOLUTION ORAL at 05:41

## 2025-05-20 RX ADMIN — MIDODRINE HYDROCHLORIDE 10 MILLIGRAM(S): 5 TABLET ORAL at 05:41

## 2025-05-20 RX ADMIN — ATORVASTATIN CALCIUM 80 MILLIGRAM(S): 80 TABLET, FILM COATED ORAL at 22:37

## 2025-05-20 RX ADMIN — Medication 5 MILLIGRAM(S): at 17:44

## 2025-05-20 RX ADMIN — Medication 5 MILLIGRAM(S): at 05:41

## 2025-05-20 RX ADMIN — POLYETHYLENE GLYCOL 3350 17 GRAM(S): 17 POWDER, FOR SOLUTION ORAL at 17:44

## 2025-05-20 RX ADMIN — MIDODRINE HYDROCHLORIDE 10 MILLIGRAM(S): 5 TABLET ORAL at 22:37

## 2025-05-20 RX ADMIN — HEPARIN SODIUM 11 UNIT(S)/HR: 1000 INJECTION INTRAVENOUS; SUBCUTANEOUS at 09:42

## 2025-05-20 NOTE — PROGRESS NOTE ADULT - SUBJECTIVE AND OBJECTIVE BOX
SUBJECTIVE:     OVERNIGHT EVENTS: none    Vital Signs Last 24 Hrs  T(C): 36.8 (20 May 2025 13:09), Max: 36.8 (19 May 2025 20:45)  T(F): 98.3 (20 May 2025 13:09), Max: 98.3 (20 May 2025 13:09)  HR: 64 (20 May 2025 13:09) (57 - 68)  BP: 101/68 (20 May 2025 13:09) (101/68 - 132/86)  BP(mean): --  RR: 18 (20 May 2025 13:09) (18 - 18)  SpO2: 97% (20 May 2025 13:09) (96% - 98%)    Parameters below as of 20 May 2025 13:09  Patient On (Oxygen Delivery Method): room air        PHYSICAL EXAM:    Neurological: Awake, alert, Ox3, PERRL, FC, moving all extremities 5/5     Pulmonary: Clear to Auscultation    Cardiovascular: Regular rate and rhythm     Gastrointestinal: Soft, Non-tender    LABS:                        13.9   7.93  )-----------( 440      ( 20 May 2025 06:32 )             43.6          PTT:49.9 sec          IMAGING:         MEDICATIONS:    acetaminophen     Tablet .. 650 milliGRAM(s) Oral every 6 hours PRN Temp greater or equal to 38C (100.4F), Mild Pain (1 - 3)  midodrine 10 milliGRAM(s) Oral every 8 hours  bisacodyl 5 milliGRAM(s) Oral every 12 hours  polyethylene glycol 3350 17 Gram(s) Oral two times a day  senna 2 Tablet(s) Oral at bedtime  atorvastatin 80 milliGRAM(s) Oral at bedtime  heparin  Infusion 1100 Unit(s)/Hr IV Continuous <Continuous>      DIET:

## 2025-05-20 NOTE — PROGRESS NOTE ADULT - ASSESSMENT
64 LHM PMHx HTN and HLD not on AC/AP presenting to [facility] as a code stroke for left sided weakness. LKW 7AM 5/5. Patient then felt LUE/LLE weakness. Had an episode of speech disturbance also around 9:30 AM, that resolved after unknown duration of time. Also, over the past few months, patient has had numbness in LUE/LLE coming and going. Denies HA, vision changes. Denies prior history of stroke. BP on presentation 113/77. Not a tenecteplase candidate outside of the time window. Not a thrombectomy candidate given likely chronic occlusion per neurointerventional team 5/6 MRA Neck:  Right internal carotid arterial occlusion. TTE wnl  Underwent diagnostic angio on 5/8/25 confirming signifiant R ICA stenosis but improving MCA clot burden, no stenting performed. Was started on hep gtt and midodrine in NSCU and monitored with no issues. Transferred out of NSCU on 5/12. Repeat CTA head & neck with improved intracranial circulation & stenosis at the origin of the right internal carotid artery over 99%. On heparin gtt    Plan    Neuro- On  Midodrine . Plans for CEA 5/21 or 5/22 as per Dr Dumont . Stop heparin gtt @ 2200   Vitals stable- On Midodrine 10mg q8. -130  D/w patient & sister over phone   PT- outpatient PT   CBC BMP coags including T & S in am    Medicine clearance appreciated

## 2025-05-21 ENCOUNTER — TRANSCRIPTION ENCOUNTER (OUTPATIENT)
Age: 65
End: 2025-05-21

## 2025-05-21 ENCOUNTER — APPOINTMENT (OUTPATIENT)
Dept: NEUROSURGERY | Facility: HOSPITAL | Age: 65
End: 2025-05-21

## 2025-05-21 LAB
ANION GAP SERPL CALC-SCNC: 13 MMOL/L — SIGNIFICANT CHANGE UP (ref 5–17)
APTT BLD: 28.6 SEC — SIGNIFICANT CHANGE UP (ref 26.1–36.8)
BLD GP AB SCN SERPL QL: NEGATIVE — SIGNIFICANT CHANGE UP
BUN SERPL-MCNC: 19 MG/DL — SIGNIFICANT CHANGE UP (ref 7–23)
CALCIUM SERPL-MCNC: 9.3 MG/DL — SIGNIFICANT CHANGE UP (ref 8.4–10.5)
CHLORIDE SERPL-SCNC: 104 MMOL/L — SIGNIFICANT CHANGE UP (ref 96–108)
CO2 SERPL-SCNC: 21 MMOL/L — LOW (ref 22–31)
CREAT SERPL-MCNC: 0.87 MG/DL — SIGNIFICANT CHANGE UP (ref 0.5–1.3)
EGFR: 96 ML/MIN/1.73M2 — SIGNIFICANT CHANGE UP
EGFR: 96 ML/MIN/1.73M2 — SIGNIFICANT CHANGE UP
GAS PNL BLDA: SIGNIFICANT CHANGE UP
GLUCOSE SERPL-MCNC: 115 MG/DL — HIGH (ref 70–99)
INR BLD: 1.11 RATIO — SIGNIFICANT CHANGE UP (ref 0.85–1.16)
POTASSIUM SERPL-MCNC: 4.3 MMOL/L — SIGNIFICANT CHANGE UP (ref 3.5–5.3)
POTASSIUM SERPL-SCNC: 4.3 MMOL/L — SIGNIFICANT CHANGE UP (ref 3.5–5.3)
PROTHROM AB SERPL-ACNC: 12.6 SEC — SIGNIFICANT CHANGE UP (ref 9.9–13.4)
RH IG SCN BLD-IMP: POSITIVE — SIGNIFICANT CHANGE UP
SODIUM SERPL-SCNC: 138 MMOL/L — SIGNIFICANT CHANGE UP (ref 135–145)

## 2025-05-21 PROCEDURE — 35301 RECHANNELING OF ARTERY: CPT | Mod: RT

## 2025-05-21 PROCEDURE — 99291 CRITICAL CARE FIRST HOUR: CPT | Mod: 24

## 2025-05-21 DEVICE — SURGIFLO MATRIX WITH THROMBIN KIT: Type: IMPLANTABLE DEVICE | Site: RIGHT | Status: FUNCTIONAL

## 2025-05-21 DEVICE — SURGIFOAM 8 X 12.5CM X 10MM (100): Type: IMPLANTABLE DEVICE | Site: RIGHT | Status: FUNCTIONAL

## 2025-05-21 DEVICE — CLIP ANEUR TII TEMP 10.5X10 STR: Type: IMPLANTABLE DEVICE | Site: RIGHT | Status: FUNCTIONAL

## 2025-05-21 DEVICE — KIT A-LINE 1LUM 20G X 12CM SAFE KIT: Type: IMPLANTABLE DEVICE | Site: RIGHT | Status: FUNCTIONAL

## 2025-05-21 DEVICE — IMPLANTABLE DEVICE: Type: IMPLANTABLE DEVICE | Site: RIGHT | Status: FUNCTIONAL

## 2025-05-21 RX ORDER — MIDODRINE HYDROCHLORIDE 5 MG/1
10 TABLET ORAL EVERY 8 HOURS
Refills: 0 | Status: DISCONTINUED | OUTPATIENT
Start: 2025-05-21 | End: 2025-05-23

## 2025-05-21 RX ORDER — OXYCODONE HYDROCHLORIDE 30 MG/1
5 TABLET ORAL EVERY 4 HOURS
Refills: 0 | Status: DISCONTINUED | OUTPATIENT
Start: 2025-05-21 | End: 2025-05-22

## 2025-05-21 RX ORDER — ACETAMINOPHEN 500 MG/5ML
650 LIQUID (ML) ORAL EVERY 6 HOURS
Refills: 0 | Status: DISCONTINUED | OUTPATIENT
Start: 2025-05-21 | End: 2025-05-23

## 2025-05-21 RX ORDER — ONDANSETRON HCL/PF 4 MG/2 ML
4 VIAL (ML) INJECTION ONCE
Refills: 0 | Status: DISCONTINUED | OUTPATIENT
Start: 2025-05-21 | End: 2025-05-22

## 2025-05-21 RX ORDER — ASPIRIN 325 MG
81 TABLET ORAL DAILY
Refills: 0 | Status: DISCONTINUED | OUTPATIENT
Start: 2025-05-21 | End: 2025-05-23

## 2025-05-21 RX ORDER — HYDROMORPHONE/SOD CHLOR,ISO/PF 2 MG/10 ML
0.5 SYRINGE (ML) INJECTION
Refills: 0 | Status: DISCONTINUED | OUTPATIENT
Start: 2025-05-21 | End: 2025-05-22

## 2025-05-21 RX ORDER — CEFAZOLIN SODIUM IN 0.9 % NACL 3 G/100 ML
2000 INTRAVENOUS SOLUTION, PIGGYBACK (ML) INTRAVENOUS EVERY 8 HOURS
Refills: 0 | Status: COMPLETED | OUTPATIENT
Start: 2025-05-21 | End: 2025-05-22

## 2025-05-21 RX ORDER — POLYETHYLENE GLYCOL 3350 17 G/17G
17 POWDER, FOR SOLUTION ORAL
Refills: 0 | Status: DISCONTINUED | OUTPATIENT
Start: 2025-05-21 | End: 2025-05-23

## 2025-05-21 RX ORDER — SENNA 187 MG
2 TABLET ORAL AT BEDTIME
Refills: 0 | Status: DISCONTINUED | OUTPATIENT
Start: 2025-05-21 | End: 2025-05-23

## 2025-05-21 RX ORDER — BISACODYL 5 MG
5 TABLET, DELAYED RELEASE (ENTERIC COATED) ORAL EVERY 12 HOURS
Refills: 0 | Status: DISCONTINUED | OUTPATIENT
Start: 2025-05-21 | End: 2025-05-23

## 2025-05-21 RX ORDER — ATORVASTATIN CALCIUM 80 MG/1
80 TABLET, FILM COATED ORAL AT BEDTIME
Refills: 0 | Status: DISCONTINUED | OUTPATIENT
Start: 2025-05-21 | End: 2025-05-23

## 2025-05-21 RX ADMIN — MIDODRINE HYDROCHLORIDE 10 MILLIGRAM(S): 5 TABLET ORAL at 05:29

## 2025-05-21 RX ADMIN — Medication 500 MILLILITER(S): at 21:10

## 2025-05-21 RX ADMIN — Medication 100 MILLIGRAM(S): at 22:37

## 2025-05-21 RX ADMIN — ATORVASTATIN CALCIUM 80 MILLIGRAM(S): 80 TABLET, FILM COATED ORAL at 22:37

## 2025-05-21 RX ADMIN — Medication 2 TABLET(S): at 22:37

## 2025-05-21 RX ADMIN — Medication 1 APPLICATION(S): at 05:33

## 2025-05-21 NOTE — PRE-ANESTHESIA EVALUATION ADULT - NSANTHPMHFT_GEN_ALL_CORE
63 y/o M PMH HTN, HLD, initially presented with left-sided UE and LE weakness a/w transient speech disturbance (now appears to be resolved), found to have right ICA stenosis, to now undergo right carotid endarterectomy.

## 2025-05-21 NOTE — PRE-OP CHECKLIST - SELECT TESTS ORDERED
BMP/CBC/PT/PTT/INR/Type and Screen
BMP/CBC/PT/PTT/Type and Screen/EKG
BMP/CBC/PT/PTT/INR/Type and Screen

## 2025-05-21 NOTE — PROGRESS NOTE ADULT - ASSESSMENT
ASSESSMENT       PLAN     N  # s/p R CEA no immediate postop complications   #R MCA ischemic stroke with R ICA stenosis  Asa 81mg  atorvastatin 80mg daily  #Pain: Tylenol and oxycodone  #Activity: [] OOB as tolerated [] Bedrest [] PT [] OT [] PMNR    CV   #-160mmHg  #HTN  HLD     P   #RA    R   #Strict I+Os   #IVL     GI   #Diet: regular  #Senna miralax  Last BM PTA     ID  afebrile    E  Goal euglycemia (-180)  #A1c 5.8    H  #DVT ppx:   SCDs  Chemoppx held   LED pending       #CODE STATUS: FULL CODE     #DISPOSITION: ICU    #CRITICAL    ASSESSMENT       PLAN     N  # s/p R CEA no immediate postop complications   #R MCA ischemic stroke with R ICA stenosis  Asa 81mg  atorvastatin 80mg daily  #Pain: Tylenol and oxycodone  #Activity: [] OOB as tolerated [] Bedrest [] PT [] OT [] PMNR    CV   #-140mmHg  #HTN  HLD     P   #RA    R   #Strict I+Os   #IVL     GI   #Diet: regular  #Senna miralax  Last BM PTA     ID  afebrile    E  Goal euglycemia (-180)  #A1c 5.8    H  #DVT ppx:   SCDs  Chemoppx held   LED pending       #CODE STATUS: FULL CODE     #DISPOSITION: ICU    #CRITICAL

## 2025-05-21 NOTE — PACU DISCHARGE NOTE - AIRWAY PATENCY:
Satisfactory Not In Labor Force: unemployed but not looking for work, retired, homemaker, student, incarcerated, or psychiatric inpatient, underage   of employment/below working age

## 2025-05-21 NOTE — PRE-ANESTHESIA EVALUATION ADULT - NSRADCARDRESULTSFT_GEN_ALL_CORE
CONCLUSIONS:      1. Left ventricular cavity is normal in size. Left ventricular wall thickness is normal. Left ventricular systolic function is normal. There are no regional wall motion abnormalities seen.   2. Normal left ventricular diastolic function, with normal left ventricular filling pressure.   3. Normal right ventricular cavity size and normal right ventricular systolic function.   4. Agitated saline injection was negative for intracardiac shunt.   5. No significant valvular disease.   6. No pericardial effusion seen.   7. No prior echocardiogram is available for comparison.

## 2025-05-21 NOTE — PROGRESS NOTE ADULT - SUBJECTIVE AND OBJECTIVE BOX
NSICU PROGRESS NOTE     Please see resident H&P for HPI.     12h events   admitted to nsicu post CEA no immediate postop complications    -----------------------------------------------------------------------------------------------------  Exam:   HEENT: neck supple    Neurology: Alert, awake, oriented on self, place and year,  speech fluent, follows simple commands, PERRL, EOMI, gaze midline, face symmetric   UE/LE 5/5  Cardiovascular: S1, S2, Regular rate and rhythm   Pulmonary: Clear to Auscultation, No rales, No rhonchi, No wheezes   Gastrointestinal: Soft, Non-tender, Non-distended   Skin: warm and dry     -----------------------------------------------------------------------------------------------------    VITALS:   Vital Signs Last 24 Hrs  T(C): 37 (21 May 2025 16:55), Max: 37 (21 May 2025 16:55)  T(F): 98.6 (21 May 2025 16:55), Max: 98.6 (21 May 2025 16:55)  HR: 96 (21 May 2025 17:30) (56 - 98)  BP: 119/71 (21 May 2025 17:05) (107/69 - 134/71)  BP(mean): 89 (21 May 2025 17:05) (89 - 90)  RR: 16 (21 May 2025 17:30) (16 - 20)  SpO2: 100% (21 May 2025 17:30) (92% - 100%)    Parameters below as of 21 May 2025 17:30  Patient On (Oxygen Delivery Method): nasal cannula  O2 Flow (L/min): 2      Drips     Respiratory:    ABG - ( 21 May 2025 15:16 )  pH, Arterial: 7.39  pH, Blood: x     /  pCO2: 42    /  pO2: 175   / HCO3: 25    / Base Excess: 0.3   /  SaO2: 96.9                LABS:                        13.9   7.93  )-----------( 440      ( 20 May 2025 06:32 )             43.6     05-21    138  |  104  |  19  ----------------------------<  115[H]  4.3   |  21[L]  |  0.87      CAPILLARY BLOOD GLUCOSE          I&O's Summary    20 May 2025 07:01  -  21 May 2025 07:00  --------------------------------------------------------  IN: 850 mL / OUT: 0 mL / NET: 850 mL        Imaging   CXR     EKG     MEDICATION LEVELS:     IVF FLUIDS/MEDICATIONS:   MEDICATIONS  (STANDING):  atorvastatin 80 milliGRAM(s) Oral at bedtime  bisacodyl 5 milliGRAM(s) Oral every 12 hours  ceFAZolin   IVPB 2000 milliGRAM(s) IV Intermittent every 8 hours  midodrine 10 milliGRAM(s) Oral every 8 hours  polyethylene glycol 3350 17 Gram(s) Oral two times a day  senna 2 Tablet(s) Oral at bedtime    MEDICATIONS  (PRN):  acetaminophen     Tablet .. 650 milliGRAM(s) Oral every 6 hours PRN Temp greater or equal to 38C (100.4F), Mild Pain (1 - 3)  HYDROmorphone  Injectable 0.5 milliGRAM(s) IV Push every 10 minutes PRN Moderate Pain (4 - 6)  ondansetron Injectable 4 milliGRAM(s) IV Push once PRN Nausea and/or Vomiting  oxyCODONE    IR 5 milliGRAM(s) Oral every 4 hours PRN Moderate Pain (4 - 6)

## 2025-05-21 NOTE — PROGRESS NOTE ADULT - CRITICAL CARE SERVICES PROVIDED
Yes
Patient is critically ill, requiring critical care services.

## 2025-05-21 NOTE — PRE-ANESTHESIA EVALUATION ADULT - NSANTHOSAYNRD_GEN_A_CORE
No. ELIGIO screening performed.  STOP BANG Legend: 0-2 = LOW Risk; 3-4 = INTERMEDIATE Risk; 5-8 = HIGH Risk

## 2025-05-22 LAB
ANION GAP SERPL CALC-SCNC: 14 MMOL/L — SIGNIFICANT CHANGE UP (ref 5–17)
BUN SERPL-MCNC: 15 MG/DL — SIGNIFICANT CHANGE UP (ref 7–23)
CALCIUM SERPL-MCNC: 9.4 MG/DL — SIGNIFICANT CHANGE UP (ref 8.4–10.5)
CHLORIDE SERPL-SCNC: 100 MMOL/L — SIGNIFICANT CHANGE UP (ref 96–108)
CO2 SERPL-SCNC: 22 MMOL/L — SIGNIFICANT CHANGE UP (ref 22–31)
CREAT SERPL-MCNC: 0.88 MG/DL — SIGNIFICANT CHANGE UP (ref 0.5–1.3)
EGFR: 95 ML/MIN/1.73M2 — SIGNIFICANT CHANGE UP
EGFR: 95 ML/MIN/1.73M2 — SIGNIFICANT CHANGE UP
GLUCOSE SERPL-MCNC: 129 MG/DL — HIGH (ref 70–99)
HCT VFR BLD CALC: 42.3 % — SIGNIFICANT CHANGE UP (ref 39–50)
HGB BLD-MCNC: 13.4 G/DL — SIGNIFICANT CHANGE UP (ref 13–17)
MAGNESIUM SERPL-MCNC: 2.3 MG/DL — SIGNIFICANT CHANGE UP (ref 1.6–2.6)
MCHC RBC-ENTMCNC: 28.8 PG — SIGNIFICANT CHANGE UP (ref 27–34)
MCHC RBC-ENTMCNC: 31.7 G/DL — LOW (ref 32–36)
MCV RBC AUTO: 91 FL — SIGNIFICANT CHANGE UP (ref 80–100)
NRBC BLD AUTO-RTO: 0 /100 WBCS — SIGNIFICANT CHANGE UP (ref 0–0)
PHOSPHATE SERPL-MCNC: 3.9 MG/DL — SIGNIFICANT CHANGE UP (ref 2.5–4.5)
PLATELET # BLD AUTO: 458 K/UL — HIGH (ref 150–400)
POTASSIUM SERPL-MCNC: 4.7 MMOL/L — SIGNIFICANT CHANGE UP (ref 3.5–5.3)
POTASSIUM SERPL-SCNC: 4.7 MMOL/L — SIGNIFICANT CHANGE UP (ref 3.5–5.3)
RBC # BLD: 4.65 M/UL — SIGNIFICANT CHANGE UP (ref 4.2–5.8)
RBC # FLD: 13 % — SIGNIFICANT CHANGE UP (ref 10.3–14.5)
SODIUM SERPL-SCNC: 136 MMOL/L — SIGNIFICANT CHANGE UP (ref 135–145)
WBC # BLD: 9.58 K/UL — SIGNIFICANT CHANGE UP (ref 3.8–10.5)
WBC # FLD AUTO: 9.58 K/UL — SIGNIFICANT CHANGE UP (ref 3.8–10.5)

## 2025-05-22 PROCEDURE — 99231 SBSQ HOSP IP/OBS SF/LOW 25: CPT | Mod: 24

## 2025-05-22 PROCEDURE — 70496 CT ANGIOGRAPHY HEAD: CPT | Mod: 26

## 2025-05-22 PROCEDURE — 70498 CT ANGIOGRAPHY NECK: CPT | Mod: 26

## 2025-05-22 PROCEDURE — 70450 CT HEAD/BRAIN W/O DYE: CPT | Mod: 26,59

## 2025-05-22 RX ORDER — MIDODRINE HYDROCHLORIDE 5 MG/1
10 TABLET ORAL ONCE
Refills: 0 | Status: COMPLETED | OUTPATIENT
Start: 2025-05-22 | End: 2025-05-22

## 2025-05-22 RX ORDER — ENOXAPARIN SODIUM 100 MG/ML
40 INJECTION SUBCUTANEOUS
Refills: 0 | Status: DISCONTINUED | OUTPATIENT
Start: 2025-05-22 | End: 2025-05-23

## 2025-05-22 RX ADMIN — POLYETHYLENE GLYCOL 3350 17 GRAM(S): 17 POWDER, FOR SOLUTION ORAL at 18:12

## 2025-05-22 RX ADMIN — MIDODRINE HYDROCHLORIDE 10 MILLIGRAM(S): 5 TABLET ORAL at 18:11

## 2025-05-22 RX ADMIN — MIDODRINE HYDROCHLORIDE 10 MILLIGRAM(S): 5 TABLET ORAL at 02:42

## 2025-05-22 RX ADMIN — ATORVASTATIN CALCIUM 80 MILLIGRAM(S): 80 TABLET, FILM COATED ORAL at 21:36

## 2025-05-22 RX ADMIN — ENOXAPARIN SODIUM 40 MILLIGRAM(S): 100 INJECTION SUBCUTANEOUS at 18:12

## 2025-05-22 RX ADMIN — Medication 100 MILLIGRAM(S): at 14:38

## 2025-05-22 RX ADMIN — Medication 100 MILLIGRAM(S): at 05:47

## 2025-05-22 RX ADMIN — Medication 5 MILLIGRAM(S): at 18:12

## 2025-05-22 RX ADMIN — POLYETHYLENE GLYCOL 3350 17 GRAM(S): 17 POWDER, FOR SOLUTION ORAL at 05:48

## 2025-05-22 RX ADMIN — Medication 81 MILLIGRAM(S): at 11:17

## 2025-05-22 RX ADMIN — Medication 1 APPLICATION(S): at 22:42

## 2025-05-22 RX ADMIN — MIDODRINE HYDROCHLORIDE 10 MILLIGRAM(S): 5 TABLET ORAL at 10:20

## 2025-05-22 NOTE — PROGRESS NOTE ADULT - ASSESSMENT
Pt requesting refill of memantine  If agreeable please sign off ASSESSMENT   Pt is a 65yo M with history history of HTN, HLD admitted for R MCA ischemic stroke with R ICA stenosis s/p R CEA 05/21/2025. Pt was not a thrombectomy candidate as out of window and not EVT candidate as no LVO.    PLAN     N  #R MCA ischemic stroke with R ICA stenosis s/p R CEA  Asa 81mg  atorvastatin 80mg daily  CTA neck in AM  #Pain: Tylenol and oxycodone  #Activity: [] OOB as tolerated [] Bedrest [] PT [] OT [] PMNR    CV   #-150mmHg  #HTN but currently hypotensive on midodrine  #HLD  c/w midodrine 10 q8h, titrate as tolerated    P  #RA    R   #Strict I+Os   #IVL     GI   #Diet: regular  #Senna miralax  Last BM PTA     ID  periop abx  afebrile    E  Goal euglycemia (-180)  #A1c 5.8    H  #DVT ppx:   SCDs  Chemoppx held until 24h post-op  LED pending       #CODE STATUS: FULL CODE     #DISPOSITION: ICU    #CRITICAL

## 2025-05-22 NOTE — PHYSICAL THERAPY INITIAL EVALUATION ADULT - PERTINENT HX OF CURRENT PROBLEM, REHAB EVAL
Pt is a 64 LHM PMHx HTN and HLD not on AC/AP presenting to [facility] as a code stroke for left sided weakness. LKW 7AM 5/5. Patient then felt LUE/LLE weakness. Had an episode of speech disturbance also around 9:30 AM, that resolved after unknown duration of time. Also, over the past few months, patient has had numbness in LUE/LLE coming and going. Denies HA, vision changes. Denies prior history of stroke. BP on presentation 113/77. Not a tenecteplase candidate outside of the time window. Not a thrombectomy candidate given likely chronic occlusion per neurointerventional team 5/6 MRA Neck:  Right internal carotid arterial occlusion. TTE wnl  Underwent diagnostic angio on 5/8/25 confirming signifiant R ICA stenosis but improving MCA clot burden, no stenting performed. Was started on hep gtt and midodrine in NSCU and monitored with no issues. Transferred out of NSCU on 5/12. Repeat CTA head & neck with improved intracranial circulation. Pt now s/p R CEA on 5/21/25. Pt is a 64 LHM PMHx HTN and HLD not on AC/AP presenting to [facility] as a code stroke for left sided weakness. LKW 7AM 5/5. Patient then felt LUE/LLE weakness. Had an episode of speech disturbance also around 9:30 AM, that resolved after unknown duration of time. Also, over the past few months, patient has had numbness in LUE/LLE coming and going. Denies HA, vision changes. Denies prior history of stroke. BP on presentation 113/77. Not a tenecteplase candidate outside of the time window. Not a thrombectomy candidate given likely chronic occlusion per neurointerventional team 5/6 MRA Neck:  Right internal carotid arterial occlusion. TTE wnl  Underwent diagnostic angio on 5/8/25 confirming signifiant R ICA stenosis but improving MCA clot burden, no stenting performed. Was started on hep gtt and midodrine in NSCU and monitored with no issues. Transferred out of NSCU on 5/12. Repeat CTA head & neck with improved intracranial circulation. Pt now s/p R CEA on 5/21/25. Head CTA/Neck CTA 5/19/25: Chronic infarct in the right basal ganglia with ex vacuo dilatation of the right lateral ventricle. No acute infarct or hemorrhage is present. Calcifications of the cavernous and supraclinoid internal carotid arteries resulting in multifocal narrowing is identified, however appear patent, markedly improved on the right side as compared to the prior exam. Right middle cerebral arteries are widely patent, new since prior exam. Focal moderate narrowing of the A1 segment of the right VENKATESH. Critical stenosis at the origin of the right internal carotid artery (over 99%), followed by reconstitution in the proximal segment and throughout the rest of the internal carotid artery, improved since prior exam. CTH 5/11/25: Small scattered evolving acute/subacute right-sided MCA distribution infarcts are again seen. No hemorrhagic transformation. Multiple additional unchanged chronic intracranial findings.

## 2025-05-22 NOTE — PHYSICAL THERAPY INITIAL EVALUATION ADULT - GAIT DEVIATIONS NOTED, PT EVAL
decreased isaias
narrow base of support/decreased isaias/decreased step length/decreased stride length

## 2025-05-22 NOTE — PHYSICAL THERAPY INITIAL EVALUATION ADULT - IMPAIRED TRANSFERS: SIT/STAND, REHAB EVAL
Assessment complete. VSS. Patient resting in bed. Respirations even and easy. Call light in reach. Fall precautions in place. No needs expressed at this time. Will continue to monitor.     impaired balance/decreased strength

## 2025-05-22 NOTE — PHYSICAL THERAPY INITIAL EVALUATION ADULT - PLANNED THERAPY INTERVENTIONS, PT EVAL
Goal: Patient will negotiate 10 steps independently in 2 weeks/balance training/gait training
GOAL: Stair Negotiation Training: Patient will be able to negotiate up & down 1 flight of stairs with unilateral rail, step to gait pattern, in 2 weeks./balance training/bed mobility training/gait training/strengthening/transfer training

## 2025-05-22 NOTE — PHYSICAL THERAPY INITIAL EVALUATION ADULT - GAIT TRAINING, PT EVAL
Goal: Patient will ambulate 200 feet independently in 2 weeks
GOAL: Patient will ambulate 300 feet with appropriate assistive device as needed, in 2 weeks.

## 2025-05-22 NOTE — PROGRESS NOTE ADULT - ATTENDING COMMENTS
I saw and examined the patient, reviewed diagnostic studies, and reviewed images personally. I agree with resident’s history, exam, orders placed, and plan of care. Total care time spent, 50 minutes. Medical issues needing to be addressed include: cerebral infarction, L hemiparesis, speech disturbance, HTN, HLD, ICA severe stenosis. Pt pending possible ICA stent per CLAUDIA, possible dissection w/ wall hematoma of R ICA origin. On heparin gtt, atorvastatin 80mg. LDL 60, a1c 5.8. Exam as above.
d/c norepinephrine, continue midodrine   heparin to 11.5
Agree with above.
POD 0 from right CEA    Awake, alert, follows, no drift, full strength  No neck hematoma, good phonation, trachea midline    -150mmHg  Continue ASA    At risk of death/neuro decline due to: reperfusion hemorrhage
currently on levo GTT for -180.  plan for angio today.  send trop, EKG 2/2 pressor use.  stay NPO.
MRI nova today.  -180.  levo GTT as needed for BP augmentation.  heparin GTT.  pending further neuro/IR planning after MRI read.
POD 1 from CEA    BP controlled. Exam stable. CT/CTA without overt ICH/Stroke and patent carotid artery.     q4hr neuro checks  ASA, statin  -150mmHg  PT/OT/OOB    Cleared for floor by NSGY
continue heparin GTT.  CT head this AM grossly unchanged.  midodrine 10q8, levo GTT now off.  stroke neurology consult.
heparin @11.5  Q5 checks   stroke core measures  IS  -160  cont midodrine 10mg Q8  statin 80mg   LBM 5/11
levo GTT to keep -160.  increase standing midodrine.  d/c plasmalyte.  s/p angio yesterday, plan for possible stenting next week.  stroke neuro should see patient.
65yo man adm for chronic R MCA occlusion, ICAD   pre op for angiogram in am  SBP in 130's intact exam  on heparin gtt PTT 50-70  consider DAPT and stroke unit transfer  lipitor 80mg   PT/OT OOB, ambulate
liberalize -160.  q2h neuro checks.  heparin GTT.  midodrine 10 mg TID.  stroke neurology consult for possible stroke unit transfer.
pre-op for angio today.  R ICA occluded on MRI done yesterday, L ICA moderate stenosis noted.  continue plasmalyte.  keep SBP target for now 120-180 pending angio.

## 2025-05-22 NOTE — PROGRESS NOTE ADULT - SUBJECTIVE AND OBJECTIVE BOX
Patient seen and examined at bedside.    --Anticoagulation--  aspirin enteric coated 81 milliGRAM(s) Oral daily    T(C): 37 (05-21-25 @ 23:00), Max: 37 (05-21-25 @ 16:55)  HR: 100 (05-21-25 @ 23:00) (58 - 119)  BP: 119/71 (05-21-25 @ 17:05) (107/69 - 134/71)  RR: 16 (05-21-25 @ 23:00) (15 - 18)  SpO2: 100% (05-21-25 @ 23:00) (94% - 100%)  Wt(kg): --    Exam: AOx3, EOMI, no drift, DIAZ 5/5 (Rt fingers amputated)

## 2025-05-22 NOTE — PHYSICAL THERAPY INITIAL EVALUATION ADULT - ADDITIONAL COMMENTS
Patient lives with spouse in a private house, 10 steps inside and was fully independent prior.
Patient lives with spouse in a private house, 10 steps inside and was fully independent prior.

## 2025-05-22 NOTE — PHYSICAL THERAPY INITIAL EVALUATION ADULT - BALANCE TRAINING, PT EVAL
GOAL: Patient will demonstrate improved static/dynamic balance to good, in order to improve stability, decrease fall risk and increase independence with ADLs within 2 weeks.
Goal: Pt will improve balance one half grade to improve performance and safety of transfers and ambulation in 2 weeks.

## 2025-05-22 NOTE — PROGRESS NOTE ADULT - ASSESSMENT
ASSESSMENT   Pt is a 65yo M with history history of HTN, HLD admitted for R MCA ischemic stroke with R ICA stenosis s/p R CEA 05/21/2025. Pt was not a thrombectomy candidate as out of window and not EVT candidate as no LVO.    PLAN     N  #R MCA ischemic stroke with R ICA stenosis s/p R CEA  Asa 81mg  atorvastatin 80mg daily  CTA neck in AM  #Pain: Tylenol and oxycodone  #Activity: [] OOB as tolerated [] Bedrest [] PT [] OT [] PMNR    CV   #-150mmHg  #HTN but currently hypotensive on midodrine  #HLD  c/w midodrine 10 q8h, titrate as tolerated    P  #RA    R   #Strict I+Os   #IVL     GI   #Diet: regular  #Senna miralax  Last BM PTA     ID  periop abx  afebrile    E  Goal euglycemia (-180)  #A1c 5.8    H  #DVT ppx:   SCDs  Chemoppx held until 24h post-op  LED pending       #CODE STATUS: FULL CODE     #DISPOSITION: ICU    #CRITICAL

## 2025-05-22 NOTE — PROGRESS NOTE ADULT - SUBJECTIVE AND OBJECTIVE BOX
NSICU PROGRESS NOTE     Please see resident H&P for HPI.     12h events   admitted to nsicu post CEA no immediate postop complications    -----------------------------------------------------------------------------------------------------  Exam:   HEENT: neck supple    Neurology: Alert, awake, oriented on self, place and year,  speech fluent, follows simple commands, PERRL, EOMI, gaze midline, face symmetric   UE/LE 5/5  Cardiovascular: S1, S2, Regular rate and rhythm   Pulmonary: Clear to Auscultation, No rales, No rhonchi, No wheezes   Gastrointestinal: Soft, Non-tender, Non-distended   Skin: warm and dry     -----------------------------------------------------------------------------------------------------    -----------------------------------------------------------------------------------------------------  ICU Vital Signs Last 24 Hrs  T(C): 37 (21 May 2025 23:00), Max: 37 (21 May 2025 16:55)  T(F): 98.6 (21 May 2025 23:00), Max: 98.6 (21 May 2025 16:55)  HR: 100 (21 May 2025 23:00) (58 - 119)  BP: 119/71 (21 May 2025 17:05) (107/69 - 134/71)  BP(mean): 89 (21 May 2025 17:05) (89 - 90)  ABP: 119/57 (21 May 2025 23:00) (119/57 - 137/71)  ABP(mean): 78 (21 May 2025 23:00) (77 - 95)  RR: 16 (21 May 2025 23:00) (15 - 18)  SpO2: 100% (21 May 2025 23:00) (94% - 100%)    O2 Parameters below as of 21 May 2025 23:00  Patient On (Oxygen Delivery Method): room air            I&O's Summary    20 May 2025 07:01  -  21 May 2025 07:00  --------------------------------------------------------  IN: 850 mL / OUT: 0 mL / NET: 850 mL    21 May 2025 07:01  -  22 May 2025 02:22  --------------------------------------------------------  IN: 1100 mL / OUT: 1250 mL / NET: -150 mL        MEDICATIONS  (STANDING):  aspirin enteric coated 81 milliGRAM(s) Oral daily  atorvastatin 80 milliGRAM(s) Oral at bedtime  bisacodyl 5 milliGRAM(s) Oral every 12 hours  ceFAZolin   IVPB 2000 milliGRAM(s) IV Intermittent every 8 hours  midodrine 10 milliGRAM(s) Oral every 8 hours  polyethylene glycol 3350 17 Gram(s) Oral two times a day  senna 2 Tablet(s) Oral at bedtime      RESPIRATORY:      IMAGING:   Recent imaging studies were reviewed.    LAB RESULTS:                          13.9   7.93  )-----------( 440      ( 20 May 2025 06:32 )             43.6       PT/INR - ( 21 May 2025 03:27 )   PT: 12.6 sec;   INR: 1.11 ratio         PTT - ( 21 May 2025 03:27 )  PTT:28.6 sec    05-21    138  |  104  |  19  ----------------------------<  115[H]  4.3   |  21[L]  |  0.87    Ca    9.3      21 May 2025 03:27            ABG - ( 21 May 2025 15:16 )  pH, Arterial: 7.39  pH, Blood: x     /  pCO2: 42    /  pO2: 175   / HCO3: 25    / Base Excess: 0.3   /  SaO2: 96.9                  -----------------------------------------------------------------------------------------------------------------------------------------------------------------------------------           [facility]     Please see resident H&P for HPI.     12h events   naeon     -----------------------------------------------------------------------------------------------------  Exam:   HEENT: neck supple    Neurology: Alert, awake, oriented on self, place and year,  speech fluent, follows simple commands, PERRL, EOMI, gaze midline, face symmetric   UE/LE 5/5  Cardiovascular: S1, S2, Regular rate and rhythm   Pulmonary: Clear to Auscultation, No rales, No rhonchi, No wheezes   Gastrointestinal: Soft, Non-tender, Non-distended   Skin: warm and dry     -----------------------------------------------------------------------------------------------------    VITALS:   Vital Signs Last 24 Hrs  T(C): 36.3 (22 May 2025 11:00), Max: 37.1 (22 May 2025 00:00)  T(F): 97.3 (22 May 2025 11:00), Max: 98.8 (22 May 2025 00:00)  HR: 74 (22 May 2025 11:00) (58 - 119)  BP: 127/79 (22 May 2025 11:00) (118/79 - 134/71)  BP(mean): 94 (22 May 2025 07:00) (89 - 94)  RR: 16 (22 May 2025 11:00) (15 - 18)  SpO2: 99% (22 May 2025 11:00) (94% - 100%)    Parameters below as of 22 May 2025 11:00  Patient On (Oxygen Delivery Method): room air        Drips     Respiratory:    ABG - ( 21 May 2025 15:16 )  pH, Arterial: 7.39  pH, Blood: x     /  pCO2: 42    /  pO2: 175   / HCO3: 25    / Base Excess: 0.3   /  SaO2: 96.9                LABS:                        13.4   9.58  )-----------( 458      ( 22 May 2025 03:05 )             42.3     05-22    136  |  100  |  15  ----------------------------<  129[H]  4.7   |  22  |  0.88      CAPILLARY BLOOD GLUCOSE          I&O's Summary    21 May 2025 07:01  -  22 May 2025 07:00  --------------------------------------------------------  IN: 1520 mL / OUT: 2100 mL / NET: -580 mL    22 May 2025 07:01  -  22 May 2025 11:41  --------------------------------------------------------  IN: 0 mL / OUT: 325 mL / NET: -325 mL        Imaging   CXR     EKG     MEDICATION LEVELS:     IVF FLUIDS/MEDICATIONS:   MEDICATIONS  (STANDING):  aspirin enteric coated 81 milliGRAM(s) Oral daily  atorvastatin 80 milliGRAM(s) Oral at bedtime  bisacodyl 5 milliGRAM(s) Oral every 12 hours  ceFAZolin   IVPB 2000 milliGRAM(s) IV Intermittent every 8 hours  chlorhexidine 4% Liquid 1 Application(s) Topical at bedtime  midodrine 10 milliGRAM(s) Oral every 8 hours  polyethylene glycol 3350 17 Gram(s) Oral two times a day  senna 2 Tablet(s) Oral at bedtime    MEDICATIONS  (PRN):  acetaminophen     Tablet .. 650 milliGRAM(s) Oral every 6 hours PRN Temp greater or equal to 38C (100.4F), Mild Pain (1 - 3)  ondansetron Injectable 4 milliGRAM(s) IV Push once PRN Nausea and/or Vomiting

## 2025-05-22 NOTE — PROGRESS NOTE ADULT - SUBJECTIVE AND OBJECTIVE BOX
NSICU PROGRESS NOTE     Please see resident H&P for HPI.     12h events   admitted to nsicu post CEA no immediate postop complications    -----------------------------------------------------------------------------------------------------  Exam:   HEENT: neck supple    Neurology: Alert, awake, oriented on self, place and year,  speech fluent, follows simple commands, PERRL, EOMI, gaze midline, face symmetric   UE/LE 5/5  Cardiovascular: S1, S2, Regular rate and rhythm   Pulmonary: Clear to Auscultation, No rales, No rhonchi, No wheezes   Gastrointestinal: Soft, Non-tender, Non-distended   Skin: warm and dry     -----------------------------------------------------------------------------------------------------    -----------------------------------------------------------------------------------------------------  ICU Vital Signs Last 24 Hrs  T(C): 37 (21 May 2025 23:00), Max: 37 (21 May 2025 16:55)  T(F): 98.6 (21 May 2025 23:00), Max: 98.6 (21 May 2025 16:55)  HR: 100 (21 May 2025 23:00) (58 - 119)  BP: 119/71 (21 May 2025 17:05) (107/69 - 134/71)  BP(mean): 89 (21 May 2025 17:05) (89 - 90)  ABP: 119/57 (21 May 2025 23:00) (119/57 - 137/71)  ABP(mean): 78 (21 May 2025 23:00) (77 - 95)  RR: 16 (21 May 2025 23:00) (15 - 18)  SpO2: 100% (21 May 2025 23:00) (94% - 100%)    O2 Parameters below as of 21 May 2025 23:00  Patient On (Oxygen Delivery Method): room air            I&O's Summary    20 May 2025 07:01  -  21 May 2025 07:00  --------------------------------------------------------  IN: 850 mL / OUT: 0 mL / NET: 850 mL    21 May 2025 07:01  -  22 May 2025 02:22  --------------------------------------------------------  IN: 1100 mL / OUT: 1250 mL / NET: -150 mL        MEDICATIONS  (STANDING):  aspirin enteric coated 81 milliGRAM(s) Oral daily  atorvastatin 80 milliGRAM(s) Oral at bedtime  bisacodyl 5 milliGRAM(s) Oral every 12 hours  ceFAZolin   IVPB 2000 milliGRAM(s) IV Intermittent every 8 hours  midodrine 10 milliGRAM(s) Oral every 8 hours  polyethylene glycol 3350 17 Gram(s) Oral two times a day  senna 2 Tablet(s) Oral at bedtime      RESPIRATORY:      IMAGING:   Recent imaging studies were reviewed.    LAB RESULTS:                          13.9   7.93  )-----------( 440      ( 20 May 2025 06:32 )             43.6       PT/INR - ( 21 May 2025 03:27 )   PT: 12.6 sec;   INR: 1.11 ratio         PTT - ( 21 May 2025 03:27 )  PTT:28.6 sec    05-21    138  |  104  |  19  ----------------------------<  115[H]  4.3   |  21[L]  |  0.87    Ca    9.3      21 May 2025 03:27            ABG - ( 21 May 2025 15:16 )  pH, Arterial: 7.39  pH, Blood: x     /  pCO2: 42    /  pO2: 175   / HCO3: 25    / Base Excess: 0.3   /  SaO2: 96.9                  -----------------------------------------------------------------------------------------------------------------------------------------------------------------------------------

## 2025-05-23 ENCOUNTER — NON-APPOINTMENT (OUTPATIENT)
Age: 65
End: 2025-05-23

## 2025-05-23 VITALS — OXYGEN SATURATION: 94 % | HEART RATE: 83 BPM | SYSTOLIC BLOOD PRESSURE: 129 MMHG | DIASTOLIC BLOOD PRESSURE: 81 MMHG

## 2025-05-23 LAB
ANION GAP SERPL CALC-SCNC: 17 MMOL/L — SIGNIFICANT CHANGE UP (ref 5–17)
BUN SERPL-MCNC: 17 MG/DL — SIGNIFICANT CHANGE UP (ref 7–23)
CALCIUM SERPL-MCNC: 9.9 MG/DL — SIGNIFICANT CHANGE UP (ref 8.4–10.5)
CHLORIDE SERPL-SCNC: 98 MMOL/L — SIGNIFICANT CHANGE UP (ref 96–108)
CO2 SERPL-SCNC: 18 MMOL/L — LOW (ref 22–31)
CREAT SERPL-MCNC: 0.84 MG/DL — SIGNIFICANT CHANGE UP (ref 0.5–1.3)
EGFR: 97 ML/MIN/1.73M2 — SIGNIFICANT CHANGE UP
EGFR: 97 ML/MIN/1.73M2 — SIGNIFICANT CHANGE UP
GLUCOSE SERPL-MCNC: 126 MG/DL — HIGH (ref 70–99)
HCT VFR BLD CALC: 45.9 % — SIGNIFICANT CHANGE UP (ref 39–50)
HGB BLD-MCNC: 14.5 G/DL — SIGNIFICANT CHANGE UP (ref 13–17)
MCHC RBC-ENTMCNC: 29.1 PG — SIGNIFICANT CHANGE UP (ref 27–34)
MCHC RBC-ENTMCNC: 31.6 G/DL — LOW (ref 32–36)
MCV RBC AUTO: 92.2 FL — SIGNIFICANT CHANGE UP (ref 80–100)
NRBC BLD AUTO-RTO: 0 /100 WBCS — SIGNIFICANT CHANGE UP (ref 0–0)
PLATELET # BLD AUTO: 442 K/UL — HIGH (ref 150–400)
POTASSIUM SERPL-MCNC: 4.8 MMOL/L — SIGNIFICANT CHANGE UP (ref 3.5–5.3)
POTASSIUM SERPL-SCNC: 4.8 MMOL/L — SIGNIFICANT CHANGE UP (ref 3.5–5.3)
RBC # BLD: 4.98 M/UL — SIGNIFICANT CHANGE UP (ref 4.2–5.8)
RBC # FLD: 13.2 % — SIGNIFICANT CHANGE UP (ref 10.3–14.5)
SODIUM SERPL-SCNC: 133 MMOL/L — LOW (ref 135–145)
WBC # BLD: 11.23 K/UL — HIGH (ref 3.8–10.5)
WBC # FLD AUTO: 11.23 K/UL — HIGH (ref 3.8–10.5)

## 2025-05-23 PROCEDURE — 85014 HEMATOCRIT: CPT

## 2025-05-23 PROCEDURE — 82947 ASSAY GLUCOSE BLOOD QUANT: CPT

## 2025-05-23 PROCEDURE — 70547 MR ANGIOGRAPHY NECK W/O DYE: CPT

## 2025-05-23 PROCEDURE — 82435 ASSAY OF BLOOD CHLORIDE: CPT

## 2025-05-23 PROCEDURE — 76937 US GUIDE VASCULAR ACCESS: CPT

## 2025-05-23 PROCEDURE — 99233 SBSQ HOSP IP/OBS HIGH 50: CPT

## 2025-05-23 PROCEDURE — C1760: CPT

## 2025-05-23 PROCEDURE — 85730 THROMBOPLASTIN TIME PARTIAL: CPT

## 2025-05-23 PROCEDURE — 97165 OT EVAL LOW COMPLEX 30 MIN: CPT

## 2025-05-23 PROCEDURE — 80048 BASIC METABOLIC PNL TOTAL CA: CPT

## 2025-05-23 PROCEDURE — 0042T: CPT | Mod: MC

## 2025-05-23 PROCEDURE — 92523 SPEECH SOUND LANG COMPREHEN: CPT

## 2025-05-23 PROCEDURE — 97168 OT RE-EVAL EST PLAN CARE: CPT

## 2025-05-23 PROCEDURE — 70551 MRI BRAIN STEM W/O DYE: CPT | Mod: MC

## 2025-05-23 PROCEDURE — 86850 RBC ANTIBODY SCREEN: CPT

## 2025-05-23 PROCEDURE — 93005 ELECTROCARDIOGRAM TRACING: CPT

## 2025-05-23 PROCEDURE — 84295 ASSAY OF SERUM SODIUM: CPT

## 2025-05-23 PROCEDURE — 80061 LIPID PANEL: CPT

## 2025-05-23 PROCEDURE — 84443 ASSAY THYROID STIM HORMONE: CPT

## 2025-05-23 PROCEDURE — 84132 ASSAY OF SERUM POTASSIUM: CPT

## 2025-05-23 PROCEDURE — 93306 TTE W/DOPPLER COMPLETE: CPT

## 2025-05-23 PROCEDURE — 36224 PLACE CATH CAROTD ART: CPT

## 2025-05-23 PROCEDURE — 93970 EXTREMITY STUDY: CPT

## 2025-05-23 PROCEDURE — 84480 ASSAY TRIIODOTHYRONINE (T3): CPT

## 2025-05-23 PROCEDURE — 80053 COMPREHEN METABOLIC PANEL: CPT

## 2025-05-23 PROCEDURE — 82330 ASSAY OF CALCIUM: CPT

## 2025-05-23 PROCEDURE — 97164 PT RE-EVAL EST PLAN CARE: CPT

## 2025-05-23 PROCEDURE — 70544 MR ANGIOGRAPHY HEAD W/O DYE: CPT

## 2025-05-23 PROCEDURE — 82803 BLOOD GASES ANY COMBINATION: CPT

## 2025-05-23 PROCEDURE — 86901 BLOOD TYPING SEROLOGIC RH(D): CPT

## 2025-05-23 PROCEDURE — C1889: CPT

## 2025-05-23 PROCEDURE — 83605 ASSAY OF LACTIC ACID: CPT

## 2025-05-23 PROCEDURE — 82962 GLUCOSE BLOOD TEST: CPT

## 2025-05-23 PROCEDURE — 85027 COMPLETE CBC AUTOMATED: CPT

## 2025-05-23 PROCEDURE — C9399: CPT

## 2025-05-23 PROCEDURE — 83735 ASSAY OF MAGNESIUM: CPT

## 2025-05-23 PROCEDURE — 70450 CT HEAD/BRAIN W/O DYE: CPT

## 2025-05-23 PROCEDURE — 84436 ASSAY OF TOTAL THYROXINE: CPT

## 2025-05-23 PROCEDURE — 70496 CT ANGIOGRAPHY HEAD: CPT | Mod: MC

## 2025-05-23 PROCEDURE — 97530 THERAPEUTIC ACTIVITIES: CPT

## 2025-05-23 PROCEDURE — C1894: CPT

## 2025-05-23 PROCEDURE — 36226 PLACE CATH VERTEBRAL ART: CPT

## 2025-05-23 PROCEDURE — C1769: CPT

## 2025-05-23 PROCEDURE — 97116 GAIT TRAINING THERAPY: CPT

## 2025-05-23 PROCEDURE — 70498 CT ANGIOGRAPHY NECK: CPT

## 2025-05-23 PROCEDURE — 85610 PROTHROMBIN TIME: CPT

## 2025-05-23 PROCEDURE — 97162 PT EVAL MOD COMPLEX 30 MIN: CPT

## 2025-05-23 PROCEDURE — 97535 SELF CARE MNGMENT TRAINING: CPT

## 2025-05-23 PROCEDURE — 84100 ASSAY OF PHOSPHORUS: CPT

## 2025-05-23 PROCEDURE — 36227 PLACE CATH XTRNL CAROTID: CPT

## 2025-05-23 PROCEDURE — 36415 COLL VENOUS BLD VENIPUNCTURE: CPT

## 2025-05-23 PROCEDURE — 83036 HEMOGLOBIN GLYCOSYLATED A1C: CPT

## 2025-05-23 PROCEDURE — 86900 BLOOD TYPING SEROLOGIC ABO: CPT

## 2025-05-23 PROCEDURE — 99285 EMERGENCY DEPT VISIT HI MDM: CPT

## 2025-05-23 PROCEDURE — 85025 COMPLETE CBC W/AUTO DIFF WBC: CPT

## 2025-05-23 PROCEDURE — 85018 HEMOGLOBIN: CPT

## 2025-05-23 PROCEDURE — 84484 ASSAY OF TROPONIN QUANT: CPT

## 2025-05-23 RX ORDER — ERGOCALCIFEROL 1.25 MG/1
1 CAPSULE ORAL
Refills: 0 | DISCHARGE

## 2025-05-23 RX ORDER — LISINOPRIL 5 MG/1
1 TABLET ORAL
Refills: 0 | DISCHARGE

## 2025-05-23 RX ORDER — MIDODRINE HYDROCHLORIDE 5 MG/1
1 TABLET ORAL
Qty: 42 | Refills: 0
Start: 2025-05-23 | End: 2025-06-05

## 2025-05-23 RX ORDER — ATORVASTATIN CALCIUM 80 MG/1
1 TABLET, FILM COATED ORAL
Refills: 0 | DISCHARGE

## 2025-05-23 RX ORDER — ASPIRIN 325 MG
1 TABLET ORAL
Qty: 30 | Refills: 0
Start: 2025-05-23 | End: 2025-06-21

## 2025-05-23 RX ORDER — ATORVASTATIN CALCIUM 80 MG/1
1 TABLET, FILM COATED ORAL
Qty: 30 | Refills: 0
Start: 2025-05-23 | End: 2025-06-21

## 2025-05-23 RX ORDER — MIDODRINE HYDROCHLORIDE 5 MG/1
5 TABLET ORAL EVERY 8 HOURS
Refills: 0 | Status: DISCONTINUED | OUTPATIENT
Start: 2025-05-23 | End: 2025-05-23

## 2025-05-23 RX ADMIN — MIDODRINE HYDROCHLORIDE 10 MILLIGRAM(S): 5 TABLET ORAL at 05:46

## 2025-05-23 RX ADMIN — Medication 81 MILLIGRAM(S): at 13:05

## 2025-05-23 RX ADMIN — MIDODRINE HYDROCHLORIDE 5 MILLIGRAM(S): 5 TABLET ORAL at 13:05

## 2025-05-23 RX ADMIN — POLYETHYLENE GLYCOL 3350 17 GRAM(S): 17 POWDER, FOR SOLUTION ORAL at 05:47

## 2025-05-23 RX ADMIN — Medication 5 MILLIGRAM(S): at 05:46

## 2025-05-23 NOTE — PROGRESS NOTE ADULT - PROBLEM SELECTOR PLAN 2
- patient with METS>4. no active chest pain nor dyspnea on exertion  - RCRI risk 0 pts, Class I Risk, 3.9% 30 day risk of death, MI, or cardiac arrest  - EKG sinus crissy, 1st deg AV block  - TTE with normal LVSF, no rWMA, no PFO  - patient medically optimized for procedure/OR at this time--whether it be stenting or CEA.
takes lisinopril 10mg daily at home  - continue to hold lisinopril while on midodrine.
- patient with METS>4. no active chest pain nor dyspnea on exertion  - RCRI risk 0 pts, Class I Risk, 3.9% 30 day risk of death, MI, or cardiac arrest  - EKG sinus crissy, 1st deg AV block  - TTE with normal LVSF, no rWMA, no PFO  - patient medically optimized for procedure/OR at this time--whether it be stenting or CEA.

## 2025-05-23 NOTE — CHART NOTE - NSCHARTNOTEFT_GEN_A_CORE
CAPRINI SCORE [CLOT] Score on Admission for     AGE RELATED RISK FACTORS                                                       MOBILITY RELATED FACTORS  [ ] Age 41-60 years                                            (1 Point)                  [ ] Bed rest                                                        (1 Point)  [X] Age: 61-74 years                                           (2 Points)                 [ ] Plaster cast                                                   (2 Points)  [ ] Age= 75 years                                              (3 Points)                 [ ] Bed bound for more than 72 hours                 (2 Points)    DISEASE RELATED RISK FACTORS                                               GENDER SPECIFIC FACTORS  [ ] Edema in the lower extremities                       (1 Point)                  [ ] Pregnancy                                                     (1 Point)  [ ] Varicose veins                                               (1 Point)                  [ ] Post-partum < 6 weeks                                   (1 Point)             [X] BMI > 25 Kg/m2                                            (1 Point)                  [ ] Hormonal therapy  or oral contraception          (1 Point)                 [ ] Sepsis (in the previous month)                        (1 Point)            [ ] History of pregnancy complications                 (1 point)  [ ] Pneumonia or serious lung disease                                            [ ] Unexplained or recurrent                     (1 Point)           (in the previous month)                               (1 Point)  [ ] Abnormal pulmonary function test                     (1 Point)                 SURGERY RELATED RISK FACTORS (include planned surgeries)  [ ] Acute myocardial infarction                              (1 Point)                 [ ]  Section                                             (1 Point)  [ ] Congestive heart failure (in the previous month)  (1 Point)         [ ] Minor surgery                                                  (1 Point)   [ ] Inflammatory bowel disease                             (1 Point)                 [ ] Arthroscopic surgery                                        (2 Points)  [ ] Central venous access                                      (2 Points)                 [ ] General surgery lasting more than 45 minutes   (2 Points)       [X] Stroke (in the previous month)                          (5 Points)               [ ] Elective arthroplasty                                         (5 Points)            [ ] current or past malignancy                              (2 Points)                                                                                                       HEMATOLOGY RELATED FACTORS                                                 TRAUMA RELATED RISK FACTORS  [ ] Prior episodes of VTE                                     (3 Points)                [ ] Fracture of the hip, pelvis, or leg                       (5 Points)  [ ] Positive family history for VTE                         (3 Points)             [ ] Acute spinal cord injury (in the previous month)  (5 Points)  [ ] Prothrombin 06660 A                                     (3 Points)                [ ] Paralysis  (less than 1 month)                             (5 Points)  [ ] Factor V Leiden                                             (3 Points)                  [ ] Multiple Trauma within 1 month                        (5 Points)  [ ] Lupus anticoagulants                                     (3 Points)                                                           [ ] Anticardiolipin antibodies                               (3 Points)                                                       [ ] High homocysteine in the blood                      (3 Points)                                             [ ] Other congenital or acquired thrombophilia      (3 Points)                                                [ ] Heparin induced thrombocytopenia                  (3 Points)                                          Total Score [  8   ]    Risk:  Very low 0   Low 1 to 2   Moderate 3 to 4   High =5       VTE Prophylaxis Recommendations:  [X] mechanical pneumatic compression devices                                      [ ] contraindicated: _____________________  [ ] chemo prophylaxis                                                                                  [X] contraindicated stroke    **** HIGH LIKELIHOOD DVT PRESENT ON ADMISSION  [X] (please order LE dopplers within 24 hours of admission)
Case discussed with stroke fellow Dr. Wright and stroke attending Dr. Mann.    Patient is neurologically stable, currently receiving Heparin gtt, still pending decision from NSGY with regards to R Carotid CEA vs stent vs medical management. Stroke neurology will continue to peripherally follow patient.    If there are further questions, feel free to contact at [phone].
Patient will require a polyfly wheelchair due to IPH and SDH. The beneficiary has a mobility limitation that significantly impairs his ability to participate in one or more MRADLS such as toileting, feeding, dressing, grooming and bathing in customary locations in the home. The patient's mobility limitation cannot be sufficiently resolved by the use of an appropriately fitted cane or walker. The patient is unable to ambulate with a walker. Use of a manual wheelchair will significantly improve the beneficiary's ability to participate in MRADLs and the beneficiary will use it on a regular basis in the home. The beneficiary is able and willing to use the wheelchair in the home. The beneficiary cannot self-propel in a standard wheelchair. The patient can self-propel in a polyfly wheelchair. The beneficiary has sufficient upper extremity function and other physical and mental capabilities needed to safely self-propel the manual lightweight wheelchair that is provided in the home during a typical day.
Interventional Neuro- Radiology   Procedure Note    Procedure: Selective Cerebral Angiography   Pre- Procedure Diagnosis:  Chronic Right MCA occlusion  Post- Procedure Diagnosis: Right ICA stenosis and right M2 occlusion    : Dr. Jose Alfredo MD  Fellow: Dr. Dailey  NP: Michelle Magana NP    RN: Марина  Tech: Surjit/kalin    Anesthesia: (MAC) Dr. Carranza    I/Os:  Fluids: 200cc  Terrell: DTV  Contrast: 81cc omni 240  Estimated Blood Loss: <10cc      Preliminary Report:  Under MAC using a  5Fr short sheath to the right femoral artery examination of left vertebral artery/ left internal carotid artery/ left external carotid artery/ right vertebral artery/ right internal carotid artery/ right external carotid artery via selective cerebral angiography demonstrates Right ICA stenosis and right M2 occlusion. Official dictated note to follow    Patient tolerated procedure well, vital signs stable, hemodynamically stable, no change in neurological status compared to baseline. Results discussed with patient and their family. Patient transferred to NSCU for further care/ monitoring.     Vascular access site: Right femoral artery  Ultrasound guidance- yes  Fluoroscopy before procedure- yes   Fluoroscopy to confirm correct needle position after puncture and before advancing sheath- yes  Femoral artery angiography before sheath removal- yes  Closure device used- Vascade  Closure device appropriately deployed- yes  Manual pressure- held for 10 minutes until hemostasis, no active bleeding or hematoma  Safeguard dressing applied- yes, applied at 1515

## 2025-05-23 NOTE — PROGRESS NOTE ADULT - PROBLEM SELECTOR PROBLEM 1
Stenosis of right internal carotid artery

## 2025-05-23 NOTE — PROGRESS NOTE ADULT - PROBLEM SELECTOR PLAN 3
takes lisinopril 10mg daily at home  - continue to hold lisinopril while on midodrine.
on atorvastatin 10mg qHS prior to admisssion  - atorvastatin 20mg qHS on d/c
takes lisinopril 10mg daily at home  - continue to hold lisinopril while on midodrine.

## 2025-05-23 NOTE — PROGRESS NOTE ADULT - TIME BILLING
Time-based billing (NON-critical care).     The necessity of the time spent during the encounter on this date of service was due to:     - Ordering, reviewing, and interpreting labs, testing, and imaging.  - Independently obtaining a review of systems and performing a physical exam  - Reviewing prior hospitalization and where necessary, outpatient records.  - Counselling and educating patient and family regarding interpretation of aforementioned items and plan of care.
Data reviewed, patient seen/examined and care plan reviewed/updated with fellow. Care coordinated with NSGY.
- Ordering, reviewing, and interpreting labs, testing, and imaging.  - Independently obtaining a review of systems and performing a physical exam  - Reviewing consultant documentation/recommendations.  - Educating patient regarding interpretation of aforementioned items and plan of care.
Time-based billing (NON-critical care).     The necessity of the time spent during the encounter on this date of service was due to:     - Ordering, reviewing, and interpreting labs, testing, and imaging.  - Independently obtaining a review of systems and performing a physical exam  - Reviewing prior hospitalization and where necessary, outpatient records.  - Counselling and educating patient and family regarding interpretation of aforementioned items and plan of care.

## 2025-05-23 NOTE — PROGRESS NOTE ADULT - SUBJECTIVE AND OBJECTIVE BOX
Vivien Robles MD  Division of Hospital Medicine  [facility]  Spectra: [phone]      Patient is a 64y old  Male who presents with a chief complaint of OSVALDO occlusion/ Right cerebral hemisphere scattered strokes  (23 May 2025 12:26)      SUBJECTIVE / OVERNIGHT EVENTS: no acute events overnight. daughter provided [place] interpretation via speakerphone. no headaches nor dizziness.  ADDITIONAL REVIEW OF SYSTEMS:    MEDICATIONS  (STANDING):  aspirin enteric coated 81 milliGRAM(s) Oral daily  atorvastatin 80 milliGRAM(s) Oral at bedtime  bisacodyl 5 milliGRAM(s) Oral every 12 hours  chlorhexidine 4% Liquid 1 Application(s) Topical at bedtime  enoxaparin Injectable 40 milliGRAM(s) SubCutaneous <User Schedule>  midodrine 5 milliGRAM(s) Oral every 8 hours  polyethylene glycol 3350 17 Gram(s) Oral two times a day  senna 2 Tablet(s) Oral at bedtime    MEDICATIONS  (PRN):  acetaminophen     Tablet .. 650 milliGRAM(s) Oral every 6 hours PRN Temp greater or equal to 38C (100.4F), Mild Pain (1 - 3)      CAPILLARY BLOOD GLUCOSE        I&O's Summary    22 May 2025 07:01  -  23 May 2025 07:00  --------------------------------------------------------  IN: 0 mL / OUT: 1075 mL / NET: -1075 mL    23 May 2025 07:01  -  23 May 2025 14:20  --------------------------------------------------------  IN: 360 mL / OUT: 640 mL / NET: -280 mL        PHYSICAL EXAM:  Vital Signs Last 24 Hrs  T(C): 37.1 (23 May 2025 09:10), Max: 37.4 (23 May 2025 00:00)  T(F): 98.7 (23 May 2025 09:10), Max: 99.4 (23 May 2025 04:33)  HR: 83 (23 May 2025 10:14) (77 - 94)  BP: 129/81 (23 May 2025 10:14) (110/71 - 148/82)  BP(mean): --  RR: 18 (23 May 2025 09:10) (16 - 18)  SpO2: 94% (23 May 2025 10:14) (94% - 98%)    Parameters below as of 23 May 2025 10:14  Patient On (Oxygen Delivery Method): room air    CONSTITUTIONAL: NAD, well-developed, well-groomed  EYES: PERRLA; conjunctiva and sclera clear  ENMT: Moist oral mucosa, no pharyngeal injection or exudates; normal dentition  NECK: Supple, no palpable masses; no thyromegaly  RESPIRATORY: Normal respiratory effort; lungs are clear to auscultation bilaterally  CARDIOVASCULAR: Regular rate and rhythm, normal S1 and S2, no murmur/rub/gallop; No lower extremity edema  ABDOMEN: Soft, Nondistended, Nontender to palpation, normoactive bowel sounds  MUSCULOSKELETAL: No clubbing or cyanosis of digits; no joint swelling or tenderness to palpation  PSYCH: A+O to person, place, and time; affect appropriate  NEUROLOGY: CN 2-12 are intact and symmetric; no gross sensory deficits, 5/5 strength throughout  SKIN: No rashes; no palpable lesions, +R neck incision site with steri strips c/d/i    LABS:                        14.5   11.23 )-----------( 442      ( 23 May 2025 11:07 )             45.9     05-23    133[L]  |  98  |  17  ----------------------------<  126[H]  4.8   |  18[L]  |  0.84    Ca    9.9      23 May 2025 11:07  Phos  3.9     05-22  Mg     2.3     05-22    Urinalysis Basic - ( 23 May 2025 11:07 )    Color: x / Appearance: x / SG: x / pH: x  Gluc: 126 mg/dL / Ketone: x  / Bili: x / Urobili: x   Blood: x / Protein: x / Nitrite: x   Leuk Esterase: x / RBC: x / WBC x   Sq Epi: x / Non Sq Epi: x / Bacteria: x    RADIOLOGY & ADDITIONAL TESTS:  Results Reviewed: H/H stable  Imaging Personally Reviewed:  Electrocardiogram Personally Reviewed:    COORDINATION OF CARE:  Care Discussed with Consultants/Other Providers [Y]: Neurosurgery NP Janell  Prior or Outpatient Records Reviewed [Y/N]:

## 2025-05-23 NOTE — PROGRESS NOTE ADULT - REASON FOR ADMISSION
OSVALDO occlusion

## 2025-05-23 NOTE — DISCHARGE NOTE PROVIDER - NSDCCPCAREPLAN_GEN_ALL_CORE_FT
PRINCIPAL DISCHARGE DIAGNOSIS  Diagnosis: ICAO (internal carotid artery occlusion)  Assessment and Plan of Treatment: s/p Right carotid endarterectomy 5/21/25   Continue Aspirin 81mg daily  Incision evaluation at Dr Dumont office in 10-14 days . steri strips to fall off by itself.  Keep Incision Clean and Dry. May shower & get incision wet 5/27/25. pat dry after. no creams or lotions on incision. No immersion baths..  No strenous activity. No heavy lifting. Do not return to work until cleared by physician. No driving until cleared by physician.  Do not take  Ibuprofen  (Motrin)  , Naproxen ( aleve)  or Meloxicam  for pain.        SECONDARY DISCHARGE DIAGNOSES  Diagnosis: Low blood pressure, not hypotension  Assessment and Plan of Treatment: On Midodrine for BP augmentation . Tapered to 5mg q8 on 5/23/25  Do not take Lisinopril    Diagnosis: Cerebral infarction, watershed distribution, unilateral, acute  Assessment and Plan of Treatment: Home PT/ OT for strengthening

## 2025-05-23 NOTE — DISCHARGE NOTE PROVIDER - NSDCACTIVITY_GEN_ALL_CORE
Do not drive or operate machinery/Showering allowed/Walking - Indoors allowed/No heavy lifting/straining/Walking - Outdoors allowed/Activity as tolerated

## 2025-05-23 NOTE — DISCHARGE NOTE PROVIDER - NSDCMRMEDTOKEN_GEN_ALL_CORE_FT
aspirin 81 mg oral delayed release tablet: 1 tab(s) orally once a day  atorvastatin 20 mg oral tablet: 1 tab(s) orally  midodrine 5 mg oral tablet: 1 tab(s) orally every 8 hours Taper off as tolerated  Polyfly wheel chair: Stroke s/p Right carotid endartrectomy  Rolling Walker: stroke/ Right carotid endartrectomy

## 2025-05-23 NOTE — DISCHARGE NOTE PROVIDER - CARE PROVIDER_API CALL
César Dumont  Neurosurgery  [address], [address]  [place] [address]  Phone: [phone]  Fax: [phone]  Follow Up Time:     Yaquelin Boyle  Internal Medicine  [address] [place] [address]  Phone: [phone]  Fax: [phone]  Follow Up Time:

## 2025-05-23 NOTE — PROGRESS NOTE ADULT - PROBLEM SELECTOR PLAN 1
- s/p dx angio on 5/8 confirming significant Right ICA stenosis but improving MCA clot burden  - TTE with no PFO  - c/w ASA 81mg daily. Agree with Atorvastatin 20mg qHS on d/c  - decrease midodrine to 5mg TID on d/c. instructed patient and family to follow-up with a PCP for guidance for weaning off midodrine

## 2025-05-23 NOTE — DISCHARGE NOTE PROVIDER - HOSPITAL COURSE
64 LHM PMHx HTN and HLD not on AC/AP presenting to [facility] as a code stroke for left sided weakness. LKW 7AM 5/5. Patient then felt LUE/LLE weakness. Had an episode of speech disturbance also around 9:30 AM, that resolved after unknown duration of time. Also, over the past few months, patient has had numbness in LUE/LLE coming and going. Denies HA, vision changes. Denies prior history of stroke. BP on presentation 113/77. Not a tenecteplase candidate outside of the time window. Not a thrombectomy candidate given likely chronic occlusion per neurointerventional team.  5/6 MR brain / MRA Neck:  Scattered foci of acute infarction within the right cerebral hemisphere have anterior to posterior parasagittal distribution suggesting low-flow/watershed mechanism. Right internal carotid arterial occlusion. TTE within normal limits .   Underwent diagnostic angio on 5/8/25 confirming signifiant R ICA stenosis but improving MCA clot burden, no stenting performed. Was started on heparin gtt and midodrine for MAP goal >100  in NSCU and monitored with no issues. Transferred out of NSCU on 5/12. Repeat CTA head & neck with improved intracranial circulation & stenosis at the origin of the right internal carotid artery over 99%. s/p Right carotid endarterectomy 5/21/25 Post op started on Aspirin . Doing well . Post op CT/ CTA brain & neck 5/22/25 without  flow limiting stenosis. Evaluated by PT/OT & recommended for Home PT. Discharged home in stable condition

## 2025-05-23 NOTE — PROGRESS NOTE ADULT - ASSESSMENT
64 LHM PMHx HTN and HLD not on AC/AP presenting to [facility] as a code stroke for left sided weakness. LKW 7AM 5/5. Patient then felt LUE/LLE weakness. Had an episode of speech disturbance also around 9:30 AM, that resolved after unknown duration of time. Also, over the past few months, patient has had numbness in LUE/LLE coming and going. Denies HA, vision changes. Denies prior history of stroke. BP on presentation 113/77. Not a tenecteplase candidate outside of the time window. Not a thrombectomy candidate given likely chronic occlusion per neurointerventional team 5/6 MRA Neck:  Right internal carotid arterial occlusion. TTE wnl  Underwent diagnostic angio on 5/8/25 confirming signifiant R ICA stenosis but improving MCA clot burden, no stenting performed. Was started on hep gtt and midodrine in NSCU and monitored with no issues. Transferred out of NSCU on 5/12. Repeat CTA head & neck with improved intracranial circulation & stenosis at the origin of the right internal carotid artery over 99%. s/p Right carotid endarterectomy 5/21/25 Post op started on Aspirin . Doing well . Post op CT/ CTA brain & neck 5/22/25 wo flow limiting stenosis.     Plan     Neuro stable Continue ASA 81mg daily   Vitals stable. On midodrine 10mg q8. Will taper to 5mg q8   DVT ppx  PT/OT Home PT  Needs polyfly wheel chair. Pt requires a polyfly wheelchair due to deficits in strength, balance, and inability to perform short ambulatory transfers, the use of a manual wheelchair will significantly improve the beneficiary’s ability to participate in ADLs. Patients mobility limitation cannot be sufficiently resolved by use of appropriately fitted Walker or cane   Outpatient medical clinic f/u         64 LHM PMHx HTN and HLD not on AC/AP presenting to [facility] as a code stroke for left sided weakness. LKW 7AM 5/5. Patient then felt LUE/LLE weakness. Had an episode of speech disturbance also around 9:30 AM, that resolved after unknown duration of time. Also, over the past few months, patient has had numbness in LUE/LLE coming and going. Denies HA, vision changes. Denies prior history of stroke. BP on presentation 113/77. Not a tenecteplase candidate outside of the time window. Not a thrombectomy candidate given likely chronic occlusion per neurointerventional team 5/6 MRA Neck:  Right internal carotid arterial occlusion. TTE wnl  Underwent diagnostic angio on 5/8/25 confirming signifiant R ICA stenosis but improving MCA clot burden, no stenting performed. Was started on hep gtt and midodrine in NSCU and monitored with no issues. Transferred out of NSCU on 5/12. Repeat CTA head & neck with improved intracranial circulation & stenosis at the origin of the right internal carotid artery over 99%. s/p Right carotid endarterectomy 5/21/25 Post op started on Aspirin . Doing well . Post op CT/ CTA brain & neck 5/22/25 wo flow limiting stenosis.     Plan     Neuro stable Continue ASA 81mg daily   Vitals stable. On midodrine 10mg q8. Will taper to 5mg q8   DVT ppx  PT/OT Home PT  Needs polyfly wheel chair. Pt requires a polyfly wheelchair due to deficits in strength, balance, and inability to perform short ambulatory transfers, the use of a manual wheelchair will significantly improve the beneficiary’s ability to participate in ADLs. Patients mobility limitation cannot be sufficiently resolved by use of appropriately fitted Walker or cane   Outpatient medicine   f/u with Dr Frances Beltran  All plans d/w patient & wife w  Christianne

## 2025-05-23 NOTE — PROGRESS NOTE ADULT - NSPROGADDITIONALINFOA_GEN_ALL_CORE
Case and plan discussed with neurosurgery team Gage Almonte
Case and plan discussed with neurosurgery team Naveed
.  Vivien Robles MD  Division of Hospital Medicine  [facility]   Spectra: [phone]    Plan discussed with patient and wife bedside with daughter on speakerphone who provided Amharic translation, and Neurosurgery NP David

## 2025-05-23 NOTE — PROGRESS NOTE ADULT - ASSESSMENT
64 male with PMH of HTN and HLD not on AC/AP presenting to [facility] as a code stroke for left sided weakness. s/p dx angio on 5/8 confirming significant Right ICA stenosis but improving MCA clot burden, no stenting performed. On hep gtt. Transferred out of NSCU on 5/12. Now s/p R CEA on 5/21.

## 2025-05-23 NOTE — PROGRESS NOTE ADULT - PROVIDER SPECIALTY LIST ADULT
Internal Medicine
NSICU
NSICU
Neurosurgery
Internal Medicine
NSICU
NSICU
Neurosurgery
Hospitalist
NSICU
Neurology
Neurosurgery
Neurosurgery
NSICU
Neurosurgery
NSICU
Neurosurgery
NSICU

## 2025-05-23 NOTE — PROGRESS NOTE ADULT - PROBLEM SELECTOR PLAN 4
DVT ppx: lovenox
on atorvastatin 10mg qHS  - c/w atorvastatin 80mg qHS.
on atorvastatin 10mg qHS  - c/w atorvastatin 80mg qHS.

## 2025-05-23 NOTE — PROGRESS NOTE ADULT - SUBJECTIVE AND OBJECTIVE BOX
SUBJECTIVE:   Turkish speaking   OVERNIGHT EVENTS: none    Vital Signs Last 24 Hrs  T(C): 37.1 (23 May 2025 09:10), Max: 37.4 (23 May 2025 00:00)  T(F): 98.7 (23 May 2025 09:10), Max: 99.4 (23 May 2025 04:33)  HR: 83 (23 May 2025 10:14) (74 - 94)  BP: 129/81 (23 May 2025 10:14) (110/71 - 148/82)  BP(mean): --  RR: 18 (23 May 2025 09:10) (16 - 18)  SpO2: 94% (23 May 2025 10:14) (94% - 99%)    Parameters below as of 23 May 2025 10:14  Patient On (Oxygen Delivery Method): room air        PHYSICAL EXAM:    Constitutional: No Acute Distress     Neurological: Awake alert Ox3, Speech clear Following Commands, Moving all Extremities 5/5 . Right lateral neck incision steristrips C/D/I     Pulmonary: Clear to Auscultation,      Cardiovascular: S1, S2, Regular rate and rhythm     Gastrointestinal: Soft, Non-tender, Non-distended     LABS:                        13.4   9.58  )-----------( 458      ( 22 May 2025 03:05 )             42.3    05-22    136  |  100  |  15  ----------------------------<  129[H]  4.7   |  22  |  0.88    Ca    9.4      22 May 2025 03:05  Phos  3.9     05-22  Mg     2.3     05-22          IMAGING:         MEDICATIONS:    acetaminophen     Tablet .. 650 milliGRAM(s) Oral every 6 hours PRN Temp greater or equal to 38C (100.4F), Mild Pain (1 - 3)  midodrine 10 milliGRAM(s) Oral every 8 hours  bisacodyl 5 milliGRAM(s) Oral every 12 hours  polyethylene glycol 3350 17 Gram(s) Oral two times a day  senna 2 Tablet(s) Oral at bedtime                                                                                                                                                                                                                                                                                                                                                                                                                                                                                                                                                                          aspirin enteric coated 81 milliGRAM(s) Oral daily  atorvastatin 80 milliGRAM(s) Oral at bedtime  chlorhexidine 4% Liquid 1 Application(s) Topical at bedtime  enoxaparin Injectable 40 milliGRAM(s) SubCutaneous <User Schedule>      DIET:    SUBJECTIVE:   Setswana speaking   OVERNIGHT EVENTS: none    Vital Signs Last 24 Hrs  T(C): 37.1 (23 May 2025 09:10), Max: 37.4 (23 May 2025 00:00)  T(F): 98.7 (23 May 2025 09:10), Max: 99.4 (23 May 2025 04:33)  HR: 83 (23 May 2025 10:14) (74 - 94)  BP: 129/81 (23 May 2025 10:14) (110/71 - 148/82)  BP(mean): --  RR: 18 (23 May 2025 09:10) (16 - 18)  SpO2: 94% (23 May 2025 10:14) (94% - 99%)    Parameters below as of 23 May 2025 10:14  Patient On (Oxygen Delivery Method): room air        PHYSICAL EXAM:    Constitutional: No Acute Distress     Neurological: Awake alert Ox3, Speech clear Following Commands, Moving all Extremities 5/5 . Right lateral neck incision steristrips C/D/I     Pulmonary: Clear to Auscultation,      Cardiovascular: S1, S2, Regular rate and rhythm     Gastrointestinal: Soft, Non-tender, Non-distended     LABS:                        13.4   9.58  )-----------( 458      ( 22 May 2025 03:05 )             42.3    05-22    136  |  100  |  15  ----------------------------<  129[H]  4.7   |  22  |  0.88    Ca    9.4      22 May 2025 03:05  Phos  3.9     05-22  Mg     2.3     05-22          IMAGING:     CT head No hydrocephalus, acute intracranial hemorrhage, mass effect, or brain edema.   CTA H/N No flow-limiting stenosis or vascular aneurysm. No AVM. Venous system is well opacified, no evidence for venous sinus or cortical vein thrombosis/ No flow-limiting stenosis, evidence for arterial dissection, or vascular aneurysm.       MEDICATIONS:    acetaminophen     Tablet .. 650 milliGRAM(s) Oral every 6 hours PRN Temp greater or equal to 38C (100.4F), Mild Pain (1 - 3)  midodrine 10 milliGRAM(s) Oral every 8 hours  bisacodyl 5 milliGRAM(s) Oral every 12 hours  polyethylene glycol 3350 17 Gram(s) Oral two times a day  senna 2 Tablet(s) Oral at bedtime                                                                                                                                                                                                                                                                                                                                                                                                                                                                                                                                                                          aspirin enteric coated 81 milliGRAM(s) Oral daily  atorvastatin 80 milliGRAM(s) Oral at bedtime  chlorhexidine 4% Liquid 1 Application(s) Topical at bedtime  enoxaparin Injectable 40 milliGRAM(s) SubCutaneous <User Schedule>      DIET:    patient states has n/v/ flank pain for 3 days

## 2025-05-27 ENCOUNTER — NON-APPOINTMENT (OUTPATIENT)
Age: 65
End: 2025-05-27

## 2025-05-28 ENCOUNTER — NON-APPOINTMENT (OUTPATIENT)
Age: 65
End: 2025-05-28

## 2025-05-28 ENCOUNTER — APPOINTMENT (OUTPATIENT)
Dept: NEUROSURGERY | Facility: CLINIC | Age: 65
End: 2025-05-28
Payer: MEDICAID

## 2025-05-28 VITALS
WEIGHT: 165 LBS | SYSTOLIC BLOOD PRESSURE: 125 MMHG | OXYGEN SATURATION: 96 % | RESPIRATION RATE: 16 BRPM | BODY MASS INDEX: 23.62 KG/M2 | TEMPERATURE: 98.3 F | DIASTOLIC BLOOD PRESSURE: 70 MMHG | HEIGHT: 70 IN

## 2025-05-28 DIAGNOSIS — I65.29 OCCLUSION AND STENOSIS OF UNSPECIFIED CAROTID ARTERY: ICD-10-CM

## 2025-05-28 PROCEDURE — 99024 POSTOP FOLLOW-UP VISIT: CPT

## 2025-05-28 NOTE — CONSULT NOTE ADULT - NSCONSULTADDITIONALINFOA_GEN_ALL_CORE
"Patient called with blood pressure update after changing metoprolol succinate to carvedilol 12.5 mg BID at LOV with Dr. Mac on 4/9/25. Reports readings have been ranging 140-150's/90's systolic with HR around 55-60. Patient reports he is tolerating the medication \"fine\". Patient does report 5 lb weight gain since starting carvedilol and feeling a little \"sleepy\" at times. Patient is willing to increase dose and see how he feels.    Patient would like Dr. Mac to know he is still not on Plavix due to blood in urine. Urology is working up possible bladder cancer and patient is scheduled for cyctoscopy Monday. 6/2/25.         Per Dr. Mac's LOV note on 4/9/25:    Assessment and Plan/Recommendations:     # CAD with inferior STEMI 2016 s/p PCI to RCA, LAD, known LCx  and RPLA branch  with collaterals. No angina  # Hematuria, now on aspirin monotherapy instead of DAPT   # HTN, BP elevated, some component of \"white coat hypertension\"  # HL, on statin   # AAA, 3cm  # CKD  # Mild-mod MR TTE 11/2023     -From a cardiac perspective patient is doing well without symptoms concerning for angina or decompensated heart failure, however his blood pressures are elevated.  Will change metoprolol succinate to carvedilol 12.5 mg twice daily.  Advised to monitor for signs/symptoms of bradycardia or hypotension.  Goal BP less than 130/80 mmHg  - TTE in 1 year  - Abdominal aortic ultrasound in 1 year  - Reviewed activity/weight lifting restrictions  - Continue remainder of cardiac regimen of aspirin, atorvastatin, lisinopril, sublingual nitroglycerin as needed  - Labs in 1 year with follow-up at that time, or sooner as needed      Encounter routed to Dr. Mac for review.   " .  Vivien Robles MD  Division of Hospital Medicine  [facility]   Spectra: [phone]    Medically optimized for either/or stenting or CEA pending final plan.  Will follow periodically.    Plan discussed with patient and Neurosurgery TAVARES Almonte.

## 2025-05-29 PROBLEM — I65.29 CAROTID ARTERY STENOSIS, UNILATERAL: Status: ACTIVE | Noted: 2025-05-29

## 2025-06-13 ENCOUNTER — APPOINTMENT (OUTPATIENT)
Dept: NEUROSURGERY | Facility: CLINIC | Age: 65
End: 2025-06-13
Payer: MEDICARE

## 2025-06-13 ENCOUNTER — NON-APPOINTMENT (OUTPATIENT)
Age: 65
End: 2025-06-13

## 2025-06-13 VITALS
HEART RATE: 87 BPM | WEIGHT: 165 LBS | HEIGHT: 70 IN | BODY MASS INDEX: 23.62 KG/M2 | DIASTOLIC BLOOD PRESSURE: 85 MMHG | SYSTOLIC BLOOD PRESSURE: 127 MMHG | OXYGEN SATURATION: 96 %

## 2025-06-13 PROCEDURE — 99024 POSTOP FOLLOW-UP VISIT: CPT

## 2025-06-27 ENCOUNTER — APPOINTMENT (OUTPATIENT)
Dept: NEUROSURGERY | Facility: CLINIC | Age: 65
End: 2025-06-27

## 2025-06-27 VITALS
DIASTOLIC BLOOD PRESSURE: 76 MMHG | SYSTOLIC BLOOD PRESSURE: 123 MMHG | HEART RATE: 74 BPM | RESPIRATION RATE: 16 BRPM | OXYGEN SATURATION: 97 % | WEIGHT: 165 LBS | HEIGHT: 70 IN | BODY MASS INDEX: 23.62 KG/M2

## 2025-06-27 PROCEDURE — 99024 POSTOP FOLLOW-UP VISIT: CPT

## (undated) DEVICE — ELCTR GROUNDING PAD ADULT COVIDIEN

## (undated) DEVICE — Device

## (undated) DEVICE — SUT VICRYL PLUS 3-0 18" CP-2 UNDYED (POP-OFF)

## (undated) DEVICE — POSITIONER FOAM EGG CRATE ULNAR 2PCS (PINK)

## (undated) DEVICE — MEDICATION LABELS W MARKER

## (undated) DEVICE — BLADE SCALPEL SAFETYLOCK #11

## (undated) DEVICE — CLAMP BULLDOG MINI STRAIGHT (GREEN) DISP

## (undated) DEVICE — SUT SOFSILK 2-0 18" TIES

## (undated) DEVICE — SOL IRR POUR NS 0.9% 500ML

## (undated) DEVICE — PREP CHLORAPREP HI-LITE ORANGE 10.5ML

## (undated) DEVICE — VENODYNE/SCD SLEEVE CALF MEDIUM

## (undated) DEVICE — WARMING BLANKET LOWER ADULT

## (undated) DEVICE — DRSG MASTISOL

## (undated) DEVICE — LONE STAR ELASTIC STAY HOOK 12MM BLUNT

## (undated) DEVICE — DRAPE IOBAN 23" X 23"

## (undated) DEVICE — ELCTR BIPOLAR CORD AESCULAP 12FT DISP

## (undated) DEVICE — SUT PROLENE 5-0 24" BV-1

## (undated) DEVICE — SOL IRR POUR H2O 250ML

## (undated) DEVICE — TOURNIQUET SET SURE-SNARE 22FR (2 TUBES, 2 UMBILICAL TAPES, 2 PLASTIC SNARES) 5"

## (undated) DEVICE — MINI DOPPLER PROBE

## (undated) DEVICE — PACK CAROTID ENDARTERECTOMY

## (undated) DEVICE — DRAPE MICROSCOPE ZEISS OPMI VISIONGUARD 154 X 52"

## (undated) DEVICE — GLV 8.5 PROTEXIS (WHITE)

## (undated) DEVICE — SPECIMEN CONTAINER 100ML

## (undated) DEVICE — FOLEY TRAY 16FR LF URINE METER SURESTEP

## (undated) DEVICE — SUT BIOSYN 4-0 18" P-12